# Patient Record
Sex: FEMALE | Race: WHITE
[De-identification: names, ages, dates, MRNs, and addresses within clinical notes are randomized per-mention and may not be internally consistent; named-entity substitution may affect disease eponyms.]

---
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
## 2023-03-30 NOTE — PROGRESS NOTE PEDS - PROBLEM SELECTOR PLAN 1
Medicare Wellness Visit  Plan for Preventive Care    A good way for you to stay healthy is to use preventive care.  Medicare covers many services that can help you stay healthy.* The goal of these services is to find any health problems as quickly as possible. Finding problems early can help make them easier to treat.  Your personal plan below lists the services you may need and when they are due.      Health Maintenance Summary     COVID-19 Vaccine (1)  Overdue - never done    Pneumococcal Vaccine 65+ (1 - PCV)  Overdue - never done    DTaP/Tdap/Td Vaccine (1 - Tdap)  Overdue - never done    Shingles Vaccine (1 of 2)  Overdue - never done    Osteoporosis Screening (Once)  Overdue - never done    Medicare Advantage- Medicare Wellness Visit (Yearly - January to December)  Due since 1/1/2023    Influenza Vaccine (1)  Postponed until 6/30/2023    Colorectal Cancer Screen- (Fecal Occult Blood - Yearly)  Ordered on 3/30/2023    Depression Screening (Yearly)  Next due on 3/30/2024    Hepatitis C Screening   Completed    Meningococcal Vaccine   Aged Out    Hepatitis B Vaccine (For Physician/APC Discussion)   Aged Out    HPV Vaccine   Aged Out           Preventive Care for Women and Men    Heart Screenings (Cardiovascular):  · Blood tests are used to check your cholesterol, lipid and triglyceride levels. High levels can increase your risk for heart disease and stroke. High levels can be treated with medications, diet and exercise. Lowering your levels can help keep your heart and blood vessels healthy.  Your provider will order these tests if they are needed.    · An ultrasound is done to see if you have an abdominal aortic aneurysm (AAA).  This is an enlargement of one of the main blood vessels that delivers blood to the body.   In the [place], 9,000 deaths are caused by AAA.  You may not even know you have this problem and as many as 1 in 3 people will have a serious problem if it is not treated.  Early diagnosis  allows for more effective treatment and cure.  If you have a family history of AAA or are a male age 65-75 who has smoked, you are at higher risk of an AAA.  Your provider can order this test, if needed.    Colorectal Screening:  · There are many tests that are used to check for cancer of your colon and rectum. You and your provider should discuss what test is best for you and when to have it done.  Options include:  · Screening Colonoscopy: exam of the entire colon, seen through a flexible lighted tube.  · Flexible Sigmoidoscopy: exam of the last third (sigmoid portion) of the colon and rectum, seen through a flexible lighted tube.  · Cologuard DNA stool test: a sample of your stool is used to screen for cancer and unseen blood in your stool.  · Fecal Occult Blood Test: a sample of your stool is studied to find any unseen blood    Flu Shot:  · An immunization that helps to prevent influenza (the flu). You should get this every year. The best time to get the shot is in the fall.    Pneumococcal Shot:  • Vaccines help prevent pneumococcal disease, which is any type of illness caused by Streptococcus pneumoniae bacteria. There are two kinds of pneumococcal vaccines available in the [place]:   o Pneumococcal conjugate vaccines (PCV20 or Iwotedp94®)  o Pneumococcal polysaccharide vaccine (PPSV23 or [id number]®)  · For those who have never received any pneumococcal conjugate vaccine, CDC recommends PVC20 for adults 65 years or older and adults 19 through 64 years old with certain medical conditions or risk factors.   · For those who have previously received PCV13, this should be followed by a dose of PPSV23.     Hepatitis B Shot:  · An immunization that helps to protect people from getting Hepatitis B. Hepatitis B is a virus that spreads through contact with infected blood or body fluids. Many people with the virus do not have symptoms.  The virus can lead to serious problems, such as liver disease. Some people  are at higher risk than others. Your doctor will tell you if you need this shot.     Diabetes Screening:  · A test to measure sugar (glucose) in your blood is called a fasting blood sugar. Fasting means you cannot have food or drink for at least 8 hours before the test. This test can detect diabetes long before you may notice symptoms.    Glaucoma Screening:  · Glaucoma screening is performed by your eye doctor. The test measures the fluid pressure inside your eyes to determine if you have glaucoma.     Hepatitis C Screening:  · A blood test to see if you have the hepatitis C virus.  Hepatitis C attacks the liver and is a major cause of chronic liver disease.  Medicare will cover a single screening for all adults born between 1945 & 1965, or high risk patients (people who have injected illegal drugs or people who have had blood transfusions).  High risk patients who continue to inject illegal drugs can be screened for Hepatitis C every year.    Smoking and Tobacco-Use Cessation Counseling:  · Tobacco is the single greatest cause of disease and early death in our country today. Medication and counseling together can increase a person’s chance of quitting for good.   · Medicare covers two quitting attempts per year, with four counseling sessions per attempt (eight sessions in a 12 month period)    Preventive Screening tests for Women    Screening Mammograms and Breast Exams:  · An x-ray of your breasts to check for breast cancer before you or your doctor may be able to feel it.  If breast cancer is found early it can usually be treated with success.    Pelvic Exams and Pap Tests:  · An exam to check for cervical and vaginal cancer. A Pap test is a lab test in which cells are taken from your cervix and sent to the lab to look for signs of cervical cancer. If cancer of the cervix is found early, chances for a cure are good. Testing can generally end at age 65, or if a woman has a hysterectomy for a benign condition.  Your provider may recommend more frequent testing if certain abnormal results are found.    Bone Mass Measurements:  · A painless x-ray of your bone density to see if you are at risk for a broken bone. Bone density refers to the thickness of bones or how tightly the bone tissue is packed.    Preventive Screening tests for Men    Prostate Screening:  · Should you have a prostate cancer test (PSA)?  It is up to you to decide if you want a prostate cancer test. Talk to your clinician to find out if the test is right for you.  Things for you to consider and talk about should include:  · Benefits and harms of the test  · Your family history  · How your race/ethnicity may influence the test  · If the test may impact other medical conditions you have  · Your values on screenings and treatments    *Medicare pays for many preventive services to keep you healthy. For some of these services, you might have to pay a deductible, coinsurance, and / or copayment.  The amounts vary depending on the type of services you need and the kind of Medicare health plan you have.    For further details on screenings offered by Medicare please visit: https://www.medicare.gov/coverage/preventive-screening-services    - Increase to 2200 calorie diet  - Daily BMP, Mg, Phos  - Daily weights and orthostatics  - Meals in day room with staff supervision  - 1 hour sit time after meals  - Patient is medically stable to attend school and afternoon [facility] groups and activities

## 2024-01-10 NOTE — PROGRESS NOTE PEDS - ASSESSMENT
Lactation Consultant Note  Lactation Consultation  Reason for Consult: Initial assessment  Consultant Name: Roberat GIULIANO De RN    Maternal Information  Has mother  before?: Yes  How long did the mother previously breastfeed?: 9months & 12months (other 2 children are 16monts apart)    Maternal Assessment       Infant Assessment  Infant Behavior:  (NB pink and sleeping in open crib)    Feeding Assessment       LATCH TOOL       Breast Pump       Other OB Lactation Tools       Patient Follow-up       Other OB Lactation Documentation  Infant Risk Factors:  (SGA  3085 at 41.2 weeks Gestation)  Additional Problem Noted: mother had 1 mild BP    Recommendations/Summary  RN in room at this time to offer assistance  Mother is   on 24 at 1736 of viable boy at 41.2 weeks gestation. Mom is gbs negative and A+ NB is SGA birthweight 6 3085 BG 58,64,61    Mother had a boy and girl at home. She  her first child for 9months, as she became pregnant and it was too difficult to maintain. She  her second child for 12months. Mother states breastfeeding is going well denies need for assistance. Mother waiting for discharge.    Educated parents on skin to skin, hand expression, hunger cues and feeding frequency/patterns. Discussed expected output, weight loss <10%, and normal bilirubin. Educated on AAP pacifier recommendations. Reviewed outpatient resources.    Michelle is a 15 y/o female with anxiety, autism, and long standing anorexia nervosa, admitted for malnutrition, bradycardia, and weight loss in the setting of caloric restriction.  She has increased risk for refeeding syndrome, therefore her electrolytes need to be monitored closely as her caloric intake is gradually increased.  Her labs have been stable.  She is bradycardic with a low overnight HR of 42. Discharged

## 2024-07-01 NOTE — CONSULT NOTE PEDS - SUBJECTIVE AND OBJECTIVE BOX
Pt is wanting 3mon f/u labs done. Please place lab orders, once placed will give Pt a call to schedule. Thank You!   Met w/ pt individually x 15min this morning. Discussed pt at length w/ nursing team and adoles. med.  Team reported worsening PO intake. Pt noted she did complete supplements at meals though cont. to have strong urges to restrict. Pt reported wanting to go home and to [facility] and not to [place]. Discussed would speak w/ team regarding plan but that pt should use that as motivation to complete meals. Pt denied feeling depressed or any si/hi/death wish. She cont .to report sig. anxieyt and trouble sleeping though noted she would not consider any medication even if father agreed. Pt denied current physical pain.    O: MSE- NAD, PMR . sleeping, awakens to voice, remains somewhat oddly related,  speech- remains dysarthric, expressive, mood- "good" affect- constricted ,fully reactive, smiles nervously at times, TP- goal directed, concrete, TC- denied si/hi/death wish/urges to self harm, Perception- does not appear to be responding to aHS/VHS, Cog- AAOx self, place, did not test date, I/J- limited, IC- good during interview

## 2024-07-13 NOTE — PROGRESS NOTE PEDS - PROVIDER SPECIALTY LIST PEDS
Adolescent Medicine Spoke with pt who reports that she is having back pain that goes to right leg. States she is calling to try to get refill for medication.Preferred pharmacy is [facility].     Dr. Duddleston called no contact made.  left.    Spoke with Dr. Do, and informed of pt request. Pt information given to provider,  and will look into matter, but is about to start with surgery so may be a few hours. But will call pt back    Pt informed, and verbalized understanding.      Reason for Disposition   [1] Prescription refill request for ESSENTIAL medicine (i.e., likelihood of harm to patient if not taken) AND [2] triager unable to refill per department policy    Protocols used: Medication Refill and Renewal Call-A-AH

## 2024-09-16 ENCOUNTER — APPOINTMENT (OUTPATIENT)
Dept: GASTROENTEROLOGY | Facility: CLINIC | Age: 19
End: 2024-09-16

## 2024-09-19 ENCOUNTER — EMERGENCY (EMERGENCY)
Facility: HOSPITAL | Age: 19
LOS: 1 days | Discharge: HOME | End: 2024-09-19
Attending: STUDENT IN AN ORGANIZED HEALTH CARE EDUCATION/TRAINING PROGRAM | Admitting: STUDENT IN AN ORGANIZED HEALTH CARE EDUCATION/TRAINING PROGRAM
Payer: MEDICAID

## 2024-09-19 VITALS
RESPIRATION RATE: 15 BRPM | DIASTOLIC BLOOD PRESSURE: 77 MMHG | SYSTOLIC BLOOD PRESSURE: 118 MMHG | OXYGEN SATURATION: 99 % | HEART RATE: 77 BPM | TEMPERATURE: 98 F

## 2024-09-19 VITALS
HEART RATE: 82 BPM | RESPIRATION RATE: 14 BRPM | DIASTOLIC BLOOD PRESSURE: 79 MMHG | SYSTOLIC BLOOD PRESSURE: 120 MMHG | HEIGHT: 63 IN | OXYGEN SATURATION: 100 % | TEMPERATURE: 99 F | WEIGHT: 119.93 LBS

## 2024-09-19 LAB
ALBUMIN SERPL ELPH-MCNC: 4.2 G/DL — SIGNIFICANT CHANGE UP (ref 3.3–5)
ALP SERPL-CCNC: 63 U/L — SIGNIFICANT CHANGE UP (ref 30–120)
ALT FLD-CCNC: 21 U/L — SIGNIFICANT CHANGE UP (ref 10–60)
ANION GAP SERPL CALC-SCNC: 10 MMOL/L — SIGNIFICANT CHANGE UP (ref 5–17)
APPEARANCE UR: CLEAR — SIGNIFICANT CHANGE UP
AST SERPL-CCNC: 15 U/L — SIGNIFICANT CHANGE UP (ref 10–40)
BASOPHILS # BLD AUTO: 0.05 K/UL — SIGNIFICANT CHANGE UP (ref 0–0.2)
BASOPHILS NFR BLD AUTO: 0.6 % — SIGNIFICANT CHANGE UP (ref 0–2)
BILIRUB SERPL-MCNC: 0.4 MG/DL — SIGNIFICANT CHANGE UP (ref 0.2–1.2)
BILIRUB UR-MCNC: NEGATIVE — SIGNIFICANT CHANGE UP
BUN SERPL-MCNC: 11 MG/DL — SIGNIFICANT CHANGE UP (ref 7–23)
CALCIUM SERPL-MCNC: 9.5 MG/DL — SIGNIFICANT CHANGE UP (ref 8.4–10.5)
CHLORIDE SERPL-SCNC: 104 MMOL/L — SIGNIFICANT CHANGE UP (ref 96–108)
CO2 SERPL-SCNC: 25 MMOL/L — SIGNIFICANT CHANGE UP (ref 22–31)
COLOR SPEC: YELLOW — SIGNIFICANT CHANGE UP
CREAT SERPL-MCNC: 0.67 MG/DL — SIGNIFICANT CHANGE UP (ref 0.5–1.3)
DIFF PNL FLD: NEGATIVE — SIGNIFICANT CHANGE UP
EGFR: 127 ML/MIN/1.73M2 — SIGNIFICANT CHANGE UP
EOSINOPHIL # BLD AUTO: 0.05 K/UL — SIGNIFICANT CHANGE UP (ref 0–0.5)
EOSINOPHIL NFR BLD AUTO: 0.6 % — SIGNIFICANT CHANGE UP (ref 0–6)
GLUCOSE SERPL-MCNC: 91 MG/DL — SIGNIFICANT CHANGE UP (ref 70–99)
GLUCOSE UR QL: NEGATIVE MG/DL — SIGNIFICANT CHANGE UP
HCG SERPL-ACNC: <1 MIU/ML — SIGNIFICANT CHANGE UP
HCG UR QL: NEGATIVE — SIGNIFICANT CHANGE UP
HCT VFR BLD CALC: 34.6 % — SIGNIFICANT CHANGE UP (ref 34.5–45)
HGB BLD-MCNC: 11.5 G/DL — SIGNIFICANT CHANGE UP (ref 11.5–15.5)
IMM GRANULOCYTES NFR BLD AUTO: 0.2 % — SIGNIFICANT CHANGE UP (ref 0–0.9)
KETONES UR-MCNC: NEGATIVE MG/DL — SIGNIFICANT CHANGE UP
LEUKOCYTE ESTERASE UR-ACNC: NEGATIVE — SIGNIFICANT CHANGE UP
LIDOCAIN IGE QN: 32 U/L — SIGNIFICANT CHANGE UP (ref 16–77)
LYMPHOCYTES # BLD AUTO: 2.07 K/UL — SIGNIFICANT CHANGE UP (ref 1–3.3)
LYMPHOCYTES # BLD AUTO: 24 % — SIGNIFICANT CHANGE UP (ref 13–44)
MCHC RBC-ENTMCNC: 28.5 PG — SIGNIFICANT CHANGE UP (ref 27–34)
MCHC RBC-ENTMCNC: 33.2 GM/DL — SIGNIFICANT CHANGE UP (ref 32–36)
MCV RBC AUTO: 85.6 FL — SIGNIFICANT CHANGE UP (ref 80–100)
MONOCYTES # BLD AUTO: 0.48 K/UL — SIGNIFICANT CHANGE UP (ref 0–0.9)
MONOCYTES NFR BLD AUTO: 5.6 % — SIGNIFICANT CHANGE UP (ref 2–14)
NEUTROPHILS # BLD AUTO: 5.94 K/UL — SIGNIFICANT CHANGE UP (ref 1.8–7.4)
NEUTROPHILS NFR BLD AUTO: 69 % — SIGNIFICANT CHANGE UP (ref 43–77)
NITRITE UR-MCNC: NEGATIVE — SIGNIFICANT CHANGE UP
NRBC # BLD: 0 /100 WBCS — SIGNIFICANT CHANGE UP (ref 0–0)
PH UR: 6.5 — SIGNIFICANT CHANGE UP (ref 5–8)
PLATELET # BLD AUTO: 292 K/UL — SIGNIFICANT CHANGE UP (ref 150–400)
POTASSIUM SERPL-MCNC: 3.4 MMOL/L — LOW (ref 3.5–5.3)
POTASSIUM SERPL-SCNC: 3.4 MMOL/L — LOW (ref 3.5–5.3)
PROT SERPL-MCNC: 7.2 G/DL — SIGNIFICANT CHANGE UP (ref 6–8.3)
PROT UR-MCNC: NEGATIVE MG/DL — SIGNIFICANT CHANGE UP
RBC # BLD: 4.04 M/UL — SIGNIFICANT CHANGE UP (ref 3.8–5.2)
RBC # FLD: 12.6 % — SIGNIFICANT CHANGE UP (ref 10.3–14.5)
SODIUM SERPL-SCNC: 139 MMOL/L — SIGNIFICANT CHANGE UP (ref 135–145)
SP GR SPEC: 1.01 — SIGNIFICANT CHANGE UP (ref 1–1.03)
UROBILINOGEN FLD QL: 0.2 MG/DL — SIGNIFICANT CHANGE UP (ref 0.2–1)
WBC # BLD: 8.61 K/UL — SIGNIFICANT CHANGE UP (ref 3.8–10.5)
WBC # FLD AUTO: 8.61 K/UL — SIGNIFICANT CHANGE UP (ref 3.8–10.5)

## 2024-09-19 PROCEDURE — 85025 COMPLETE CBC W/AUTO DIFF WBC: CPT

## 2024-09-19 PROCEDURE — 99284 EMERGENCY DEPT VISIT MOD MDM: CPT | Mod: 25

## 2024-09-19 PROCEDURE — 81003 URINALYSIS AUTO W/O SCOPE: CPT

## 2024-09-19 PROCEDURE — 83690 ASSAY OF LIPASE: CPT

## 2024-09-19 PROCEDURE — 36415 COLL VENOUS BLD VENIPUNCTURE: CPT

## 2024-09-19 PROCEDURE — 81025 URINE PREGNANCY TEST: CPT

## 2024-09-19 PROCEDURE — 74177 CT ABD & PELVIS W/CONTRAST: CPT | Mod: MC

## 2024-09-19 PROCEDURE — 84702 CHORIONIC GONADOTROPIN TEST: CPT

## 2024-09-19 PROCEDURE — 80053 COMPREHEN METABOLIC PANEL: CPT

## 2024-09-19 PROCEDURE — 99285 EMERGENCY DEPT VISIT HI MDM: CPT

## 2024-09-19 PROCEDURE — 96360 HYDRATION IV INFUSION INIT: CPT | Mod: XU

## 2024-09-19 PROCEDURE — 74177 CT ABD & PELVIS W/CONTRAST: CPT | Mod: 26,MC

## 2024-09-19 RX ORDER — SODIUM CHLORIDE 9 MG/ML
1000 INJECTION INTRAMUSCULAR; INTRAVENOUS; SUBCUTANEOUS ONCE
Refills: 0 | Status: COMPLETED | OUTPATIENT
Start: 2024-09-19 | End: 2024-09-19

## 2024-09-19 RX ADMIN — SODIUM CHLORIDE 1000 MILLILITER(S): 9 INJECTION INTRAMUSCULAR; INTRAVENOUS; SUBCUTANEOUS at 15:15

## 2024-09-19 RX ADMIN — SODIUM CHLORIDE 1000 MILLILITER(S): 9 INJECTION INTRAMUSCULAR; INTRAVENOUS; SUBCUTANEOUS at 16:06

## 2024-09-19 NOTE — ED PROVIDER NOTE - CARE PROVIDER_API CALL
Lee Ann Ceballos  Obstetrics and Gynecology  [address] [place] [id number]  Phone: [phone]  Fax: [phone]  Follow Up Time: 4-6 Days

## 2024-09-19 NOTE — ED ADULT NURSE NOTE - NSFALLUNIVINTERV_ED_ALL_ED
Bed/Stretcher in lowest position, wheels locked, appropriate side rails in place/Call bell, personal items and telephone in reach/Instruct patient to call for assistance before getting out of bed/chair/stretcher/Non-slip footwear applied when patient is off stretcher/[place] to call system/Physically safe environment - no spills, clutter or unnecessary equipment/Purposeful proactive rounding/Room/bathroom lighting operational, light cord in reach

## 2024-09-19 NOTE — ED PROVIDER NOTE - PROGRESS NOTE DETAILS
Results discussed with patient and boyfriend at bedside..  Patient does not have an OB/GYN so we will give GYN follow-up.

## 2024-09-19 NOTE — ED PROVIDER NOTE - OBJECTIVE STATEMENT
19 F here complaining of right sided abdominal pain. on going / worsening for the past few day. patient has associated nausea and dysuria. patient reports she went to outside hospital ER approximately 1 week ago for the same pain, was told she had an urinary tract infection and was put on antibiotics. patient initaially on doxy but had side effects so was changed to keflex........... 19 F here complaining of right sided abdominal pain. on going / worsening for the past few day. patient has associated nausea and dysuria. patient reports she went to outside hospital ER approximately 1 week ago for the same pain, was told she had an urinary tract infection and was put on antibiotics. patient initially on doxy but had side effects so was changed to keflex ........

## 2024-09-19 NOTE — ED PROVIDER NOTE - PATIENT PORTAL LINK FT
You can access the FollowMyHealth Patient Portal offered by [facility] by registering at the following website: [email or web address]. By joining [employer]’s FollowMyHealth portal, you will also be able to view your health information using other applications (apps) compatible with our system.

## 2024-09-19 NOTE — ED ADULT NURSE NOTE - CHPI ED NUR SYMPTOMS NEG
no blood in stool/no burning urination/no chills/no diarrhea/no fever/no hematuria/no vomiting no blood in stool/no chills/no diarrhea/no fever/no hematuria/no vomiting

## 2024-09-19 NOTE — ED PROVIDER NOTE - NSFOLLOWUPINSTRUCTIONS_ED_ALL_ED_FT
Please follow up with your Primary Care Physician and any specialists as discussed.  Increase fiber and water intake as well as exercise to help with constipation.    You can take acetaminophen (Tylenol) for your pain. It is available over the counter. You can take up to 1 gram (three 325mg tablets or two 500mg tablets) every 4-6 hours as needed.    You can also take an NSAID (non-steroidal anti-inflammatory drug) such as ibuprofen (Motrin, Advil), or naproxen (Aleve) for your pain. These are available over the counter. You can take 3 tablets of ibuprofen (200mg each) every 6 hours or 2 tablets of naproxen (220mg each) every 12 hours. Do no mix NSAIDs such as ibuprofen, diclofenac, ketorolac, and naproxen. Please take as needed and please take with food.    You can alternate acetaminophen and a NSAID to help further with pain.   If your symptoms persist or worsen, please seek care. Either return to the Emergency Department, go to urgent care or see your primary care doctor.  Please refer to general information and instructions attached or below:     Acute Abdominal Pain    WHAT YOU NEED TO KNOW:    The cause of your abdominal pain may not be found. If a cause is found, treatment will depend on what the cause is.     DISCHARGE INSTRUCTIONS:    Return to the emergency department if:     You vomit blood or cannot stop vomiting.      You have blood in your bowel movement or it looks like tar.       You have bleeding from your rectum.       Your abdomen is larger than usual, more painful, and hard.       You have severe pain in your abdomen.       You stop passing gas and having bowel movements.       You feel weak, dizzy, or faint.    Contact your healthcare provider if:     You have a fever.      You have new signs and symptoms.      Your symptoms do not get better with treatment.       You have questions or concerns about your condition or care.    Medicines may be given to decrease pain, treat an infection, and manage your symptoms. Take your medicine as directed. Call your healthcare provider if you think your medicine is not helping or if you have side effects. Tell him if you are allergic to any medicine. Keep a list of the medicines, vitamins, and herbs you take. Include the amounts, and when and why you take them. Bring the list or the pill bottles to follow-up visits. Carry your medicine list with you in case of an emergency.    Manage your symptoms:     Apply heat on your abdomen for 20 to 30 minutes every 2 hours for as many days as directed. Heat helps decrease pain and muscle spasms.       Manage your stress. Stress may cause abdominal pain. Your healthcare provider may recommend relaxation techniques and deep breathing exercises to help decrease your stress. Your healthcare provider may recommend you talk to someone about your stress or anxiety, such as a counselor or a trusted friend. Get plenty of sleep and exercise regularly.       Limit or do not drink alcohol. Alcohol can make your abdominal pain worse. Ask your healthcare provider if it is safe for you to drink alcohol. Also ask how much is safe for you to drink.       Do not smoke. Nicotine and other chemicals in cigarettes can damage your esophagus and stomach. Ask your healthcare provider for information if you currently smoke and need help to quit. E-cigarettes or smokeless tobacco still contain nicotine. Talk to your healthcare provider before you use these products.     Make changes to the food you eat as directed: Do not eat foods that cause abdominal pain or other symptoms. Eat small meals more often.     Eat more high-fiber foods if you are constipated. High-fiber foods include fruits, vegetables, whole-grain foods, and legumes.       Do not eat foods that cause gas if you have bloating. Examples include broccoli, cabbage, and cauliflower. Do not drink soda or carbonated drinks, because these may also cause gas.       Do not eat foods or drinks that contain sorbitol or fructose if you have diarrhea and bloating. Some examples are fruit juices, candy, jelly, and sugar-free gum.       Do not eat high-fat foods, such as fried foods, cheeseburgers, hot dogs, and desserts.      Limit or do not drink caffeine. Caffeine may make symptoms, such as heart burn or nausea, worse.       Drink plenty of liquids to prevent dehydration from diarrhea or vomiting. Ask your healthcare provider how much liquid to drink each day and which liquids are best for you.

## 2024-09-19 NOTE — ED ADULT TRIAGE NOTE - CHIEF COMPLAINT QUOTE
persistent right sided abd pain with nausea for weeks,lmp 9/1, recent UTI with antibiotics, seen at [facility] 2 days ago

## 2024-09-19 NOTE — ED ADULT NURSE NOTE - CHIEF COMPLAINT QUOTE
persistent right sided abd pain with nausea for weeks,lmp 9/1, recent UTI with antibiotics, seen at [facility] 2 days ago

## 2024-09-19 NOTE — ED ADULT NURSE NOTE - OBJECTIVE STATEMENT
pt comes to ed c/o abd pain with nausea for weeks, lmp 9/1, recent UTI with antibiotics, seen at [facility] 2 days ago pt comes to ed c/o R side abd pain with nausea for weeks, lmp 9/1, completed abx for recent UTI, still having some residual symptoms. pt states she was seen at [facility] 2 days ago and never did a CT scan. pt wants to make sure she doesn't have appendicitis.

## 2024-09-23 ENCOUNTER — EMERGENCY (EMERGENCY)
Facility: HOSPITAL | Age: 19
LOS: 1 days | Discharge: HOME | End: 2024-09-23
Attending: EMERGENCY MEDICINE | Admitting: EMERGENCY MEDICINE
Payer: MEDICAID

## 2024-09-23 VITALS
RESPIRATION RATE: 16 BRPM | OXYGEN SATURATION: 99 % | TEMPERATURE: 98 F | HEART RATE: 83 BPM | WEIGHT: 126.99 LBS | SYSTOLIC BLOOD PRESSURE: 110 MMHG | DIASTOLIC BLOOD PRESSURE: 70 MMHG | HEIGHT: 63 IN

## 2024-09-23 VITALS
RESPIRATION RATE: 18 BRPM | DIASTOLIC BLOOD PRESSURE: 72 MMHG | SYSTOLIC BLOOD PRESSURE: 109 MMHG | HEART RATE: 87 BPM | OXYGEN SATURATION: 98 % | TEMPERATURE: 98 F

## 2024-09-23 DIAGNOSIS — Z78.9 OTHER SPECIFIED HEALTH STATUS: Chronic | ICD-10-CM

## 2024-09-23 LAB
APPEARANCE UR: CLEAR — SIGNIFICANT CHANGE UP
BILIRUB UR-MCNC: NEGATIVE — SIGNIFICANT CHANGE UP
COLOR SPEC: SIGNIFICANT CHANGE UP
DIFF PNL FLD: NEGATIVE — SIGNIFICANT CHANGE UP
GLUCOSE UR QL: NEGATIVE MG/DL — SIGNIFICANT CHANGE UP
HCG UR QL: NEGATIVE — SIGNIFICANT CHANGE UP
KETONES UR-MCNC: 15 MG/DL
LEUKOCYTE ESTERASE UR-ACNC: NEGATIVE — SIGNIFICANT CHANGE UP
NITRITE UR-MCNC: NEGATIVE — SIGNIFICANT CHANGE UP
PH UR: 5.5 — SIGNIFICANT CHANGE UP (ref 5–8)
PROT UR-MCNC: NEGATIVE MG/DL — SIGNIFICANT CHANGE UP
SP GR SPEC: 1.03 — SIGNIFICANT CHANGE UP (ref 1–1.03)
UROBILINOGEN FLD QL: 0.2 MG/DL — SIGNIFICANT CHANGE UP (ref 0.2–1)

## 2024-09-23 PROCEDURE — 99284 EMERGENCY DEPT VISIT MOD MDM: CPT

## 2024-09-23 PROCEDURE — 81025 URINE PREGNANCY TEST: CPT

## 2024-09-23 PROCEDURE — 70450 CT HEAD/BRAIN W/O DYE: CPT | Mod: MC

## 2024-09-23 PROCEDURE — 99284 EMERGENCY DEPT VISIT MOD MDM: CPT | Mod: 25

## 2024-09-23 PROCEDURE — 81003 URINALYSIS AUTO W/O SCOPE: CPT

## 2024-09-23 PROCEDURE — 70450 CT HEAD/BRAIN W/O DYE: CPT | Mod: 26,MC

## 2024-09-23 RX ORDER — SODIUM CHLORIDE 9 MG/ML
1000 INJECTION INTRAMUSCULAR; INTRAVENOUS; SUBCUTANEOUS ONCE
Refills: 0 | Status: DISCONTINUED | OUTPATIENT
Start: 2024-09-23 | End: 2024-09-23

## 2024-09-23 RX ORDER — ACETAMINOPHEN 325 MG/1
1000 TABLET ORAL ONCE
Refills: 0 | Status: DISCONTINUED | OUTPATIENT
Start: 2024-09-23 | End: 2024-09-23

## 2024-09-23 RX ORDER — ONDANSETRON 2 MG/ML
4 INJECTION, SOLUTION INTRAMUSCULAR; INTRAVENOUS ONCE
Refills: 0 | Status: COMPLETED | OUTPATIENT
Start: 2024-09-23 | End: 2024-09-23

## 2024-09-23 RX ORDER — ACETAMINOPHEN 325 MG/1
650 TABLET ORAL ONCE
Refills: 0 | Status: COMPLETED | OUTPATIENT
Start: 2024-09-23 | End: 2024-09-23

## 2024-09-23 RX ADMIN — ACETAMINOPHEN 650 MILLIGRAM(S): 325 TABLET ORAL at 19:22

## 2024-09-23 RX ADMIN — ACETAMINOPHEN 650 MILLIGRAM(S): 325 TABLET ORAL at 19:45

## 2024-09-23 RX ADMIN — ONDANSETRON 4 MILLIGRAM(S): 2 INJECTION, SOLUTION INTRAMUSCULAR; INTRAVENOUS at 19:22

## 2024-09-23 NOTE — ED PROVIDER NOTE - PROVIDER TOKENS
FREE:[LAST:[YOUR NEUROLOGIST],PHONE:[(   )    -],FAX:[(   )    -],FOLLOWUP:[1-3 Days]],PROVIDER:[TOKEN:[[id number]],FOLLOWUP:[1-3 Days]],PROVIDER:[TOKEN:[[id number]],FOLLOWUP:[1-3 Days]]

## 2024-09-23 NOTE — ED ADULT NURSE NOTE - OBJECTIVE STATEMENT
multiple complaints, persistent pain on urination, skin disorders,I was here last week and I was told I had ovarian cyst and it hurts to pee. im also having bad headaches that worsen yesterday. I also notice white bumps inside my nose after a piercing. The urgent care said it might be herpes.

## 2024-09-23 NOTE — ED PROVIDER NOTE - OBJECTIVE STATEMENT
19-year-old female with history of migraines, ovarian cysts presents with complaint of headache x 3 days.  Patient states that she recently hit her left frontal head on a chair at home on 9/21 and since has had a headache with occasional nausea and lightheadedness.  Denies LOC.  States unsure if headache is also related to her migraines.  States that she takes sumatriptan for migraines as needed however has not taken any pain medication since the headache started.  Patient states that she was seen in the ER 2 weeks ago for dysuria and was diagnosed with UTI and started on antibiotics, was seen in the ER again last week for right-sided abdominal pain, had CT scan of abdomen pelvis and was told to have bilateral ovarian cysts and has not made an appointment with GYN.  Denies any acute symptoms however states that she has occasional pain in her suprapubic area when she urinates since her last visit to the ER.  Denies dysuria/frequency/hematuria, vaginal discharge, vaginal bleeding, fever, chills, vomiting, numbness, tingling, focal weakness, use of blood thinners or other symptoms.  Patient states that she has a neurologist and had an MRI few months ago which was normal. 19-year-old female with history of migraines, ovarian cysts presents with complaint of headache x 3 days.  Patient states that she recently hit her left frontal head on a chair at home on 9/21 and since has had a headache with occasional nausea and lightheadedness.  Denies LOC.  States unsure if headache is also related to her migraines.  States that she takes sumatriptan for migraines as needed however has not taken any pain medication since the headache started.  Patient states that she was seen in the ER 2 weeks ago for dysuria and was diagnosed with UTI and started on antibiotics, was seen in the ER again last week for right-sided abdominal pain, had CT scan of abdomen pelvis and was told to have bilateral ovarian cysts and has not yet made an appointment with GYN.  Denies any acute symptoms however states that she has occasional pain in her suprapubic area when she urinates since her last visit to the ER.  Denies dysuria/frequency/hematuria, vaginal discharge, vaginal bleeding, fever, chills, vomiting, numbness, tingling, focal weakness, use of blood thinners, rash, neck stiffness, photophobia, vision changes or other symptoms.  Patient states that she has a neurologist and had an MRI few months ago which was normal.

## 2024-09-23 NOTE — ED PROVIDER NOTE - CARE PROVIDERS DIRECT ADDRESSES
,DirectAddress_Unknown,DirectAddress_Unknown,[email or web address]

## 2024-09-23 NOTE — ED PROVIDER NOTE - CARE PLAN
1 Principal Discharge DX:	Headache   Principal Discharge DX:	Headache  Secondary Diagnosis:	Closed head injury

## 2024-09-23 NOTE — ED PROVIDER NOTE - NSFOLLOWUPINSTRUCTIONS_ED_ALL_ED_FT
Follow-up with your neurologist and GYN for reevaluation, ongoing care and treatment.  Rest, drink plenty of fluids.  Take sumatriptan as prescribed earlier or over-the-counter Excedrin Migraine as directed.  If having worsening symptoms, vomiting or other related symptoms, return to the ER immediately.      Migraine Headache  A migraine headache is a very strong throbbing pain on one or both sides of your head. This type of headache can also cause other symptoms. It can last from 4 hours to 3 days. Talk with your doctor about what things may bring on (trigger) this condition.    What are the causes?  The exact cause of a migraine is not known. This condition may be brought on or caused by:  Smoking.  Medicines, such as:  Medicine used to treat chest pain (nitroglycerin).  Birth control pills.  Estrogen.  Some blood pressure medicines.  Certain substances in some foods or drinks.  Foods and drinks, such as:  Cheese.  Chocolate.  Alcohol.  Caffeine.  Doing physical activity that is very hard.  Other things that may trigger a migraine headache include:  Periods.  Pregnancy.  Hunger.  Stress.  Getting too much or too little sleep.  Weather changes.  Feeling tired (fatigue).  What increases the risk?  Being 25–55 years old.  Being female.  Having a family history of migraine headaches.  Being .  Having a mental health condition, such as being sad (depressed) or feeling worried or nervous (anxious).  Being very overweight (obese).  What are the signs or symptoms?  A throbbing pain. This pain may:  Happen in any area of the head, such as on one or both sides.  Make it hard to do daily activities.  Get worse with physical activity.  Get worse around bright lights, loud noises, or smells.  Other symptoms may include:  Feeling like you may vomit (nauseous).  Vomiting.  Dizziness.  Before a migraine headache starts, you may get warning signs (an aura). An aura may include:  Seeing flashing lights or having blind spots.  Seeing bright spots, halos, or zigzag lines.  Having tunnel vision or blurred vision.  Having numbness or a tingling feeling.  Having trouble talking.  Having weak muscles.  After a migraine ends, you may have symptoms. These may include:  Tiredness.  Trouble thinking (concentrating).  How is this treated?  Taking medicines that:  Relieve pain.  Relieve the feeling like you may vomit.  Prevent migraine headaches.  Treatment may also include:  Acupuncture.  Lifestyle changes like avoiding foods that bring on migraine headaches.  Learning ways to control your body functions (biofeedback).  Therapy to help you know and deal with negative thoughts (cognitive behavioral therapy).  Follow these instructions at home:  Medicines    Take over-the-counter and prescription medicines only as told by your doctor.  If told, take steps to prevent problems with pooping (constipation). You may need to:  Drink enough fluid to keep your pee (urine) pale yellow.  Take medicines. You will be told what medicines to take.  Eat foods that are high in fiber. These include beans, whole grains, and fresh fruits and vegetables.  Limit foods that are high in fat and sugar. These include fried or sweet foods.  Ask your doctor if you should avoid driving or using machines while you are taking your medicine.    Lifestyle    A person sitting on the floor doing yoga.  Do not drink alcohol.  Do not smoke or use any products that contain nicotine or tobacco. If you need help quitting, ask your doctor.  Get 7–9 hours of sleep each night, or the amount recommended by your doctor.  Find ways to deal with stress, such as meditation, deep breathing, or yoga.  Try to exercise often. This can help lessen how bad and how often your migraines happen.  General instructions    Keep a journal to find out what may bring on your migraine headaches. This can help you avoid those things. For example, write down:  What you eat and drink.  How much sleep you get.  Any change to your medicines or diet.  If you have a migraine headache:  Avoid things that make your symptoms worse, such as bright lights.  Lie down in a dark, quiet room.  Do not drive or use machinery.  Ask your doctor what activities are safe for you.  Where to find more information  Coalition for Headache and Migraine Patients (CHAMP): headachemigraine.org  American Migraine Foundation: americanmigrainefoundation.org  National Headache Foundation: headaches.org  Contact a doctor if:  You get a migraine headache that is different or worse than others you have had.  You have more than 15 days of headaches in one month.  Get help right away if:  Your migraine headache gets very bad.  Your migraine headache lasts more than 72 hours.  You have a fever or stiff neck.  You have trouble seeing.  Your muscles feel weak or like you cannot control them.  You lose your balance a lot.  You have trouble walking.  You faint.  You have a seizure.

## 2024-09-23 NOTE — ED PROVIDER NOTE - PATIENT PORTAL LINK FT
You can access the FollowMyHealth Patient Portal offered by [facility] by registering at the following website: [email or web address]. By joining [employer]’s FollowMyHealth portal, you will also be able to view your health information using other applications (apps) compatible with our system.

## 2024-09-23 NOTE — ED PROVIDER NOTE - CLINICAL SUMMARY MEDICAL DECISION MAKING FREE TEXT BOX
19-year-old female with history of migraines, ovarian cysts presents with complaint of headache x 3 days.  Patient states that she recently hit her left frontal head on a chair at home on 9/21 and since has had a headache with occasional nausea and lightheadedness.  Denies LOC.  States unsure if headache is also related to her migraines.  States that she takes sumatriptan for migraines as needed however has not taken any pain medication since the headache started.  Patient states that she was seen in the ER 2 weeks ago for dysuria and was diagnosed with UTI and started on antibiotics, was seen in the ER again last week for right-sided abdominal pain, had CT scan of abdomen pelvis and was told to have bilateral ovarian cysts and has not made an appointment with GYN.  Denies any acute symptoms however states that she has occasional pain in her suprapubic area when she urinates since her last visit to the ER.  Denies dysuria/frequency/hematuria, vaginal discharge, vaginal bleeding, fever, chills, vomiting, numbness, tingling, focal weakness, use of blood thinners or other symptoms.  Patient states that she has a neurologist and had an MRI few months ago which was normal.    VS noted  Pt is in no acute distress  Physical exam unremarkable. Neuro exam nonfocal  Will give pain management  Will obtain CTH to eval for intracranial pathology  Will reassess.

## 2024-09-23 NOTE — ED PROVIDER NOTE - CARE PROVIDER_API CALL
YOUR NEUROLOGIST,   Phone: (   )    -  Fax: (   )    -  Follow Up Time: 1-3 Days    Darshan Silver  Neurology  [address] [place] [id number]  Phone: [phone]  Fax: [phone]  Follow Up Time: 1-3 Days    Rickey Hair  Obstetrics and Gynecology  [address] [place] [id number]  Phone: [phone]  Fax: [phone]  Follow Up Time: 1-3 Days

## 2024-09-23 NOTE — ED ADULT TRIAGE NOTE - CHIEF COMPLAINT QUOTE
I was here last week and I was told I had ovarian cyst and it hurts to pee. im also having bad headaches that worsen yesterday. I also notice white bumps inside my nose after a piercing. The urgent care said it might be herpes.

## 2024-09-23 NOTE — ED PROVIDER NOTE - SKIN, MLM
Troponin 0.072 troponin level 1.570 ptt 165.7 Skin normal color for race, warm, dry and intact. No evidence of rash.

## 2024-09-27 ENCOUNTER — EMERGENCY (EMERGENCY)
Facility: HOSPITAL | Age: 19
LOS: 1 days | Discharge: HOME | End: 2024-09-27
Attending: STUDENT IN AN ORGANIZED HEALTH CARE EDUCATION/TRAINING PROGRAM | Admitting: STUDENT IN AN ORGANIZED HEALTH CARE EDUCATION/TRAINING PROGRAM
Payer: MEDICAID

## 2024-09-27 VITALS
DIASTOLIC BLOOD PRESSURE: 78 MMHG | TEMPERATURE: 99 F | HEART RATE: 78 BPM | WEIGHT: 133.38 LBS | SYSTOLIC BLOOD PRESSURE: 117 MMHG | RESPIRATION RATE: 13 BRPM | HEIGHT: 63 IN | OXYGEN SATURATION: 100 %

## 2024-09-27 PROCEDURE — 99283 EMERGENCY DEPT VISIT LOW MDM: CPT | Mod: 25

## 2024-09-27 PROCEDURE — 99283 EMERGENCY DEPT VISIT LOW MDM: CPT

## 2024-09-27 NOTE — ED ADULT TRIAGE NOTE - HEIGHT IN INCHES
Pt is pleasant and cooperative  Pt is med compliant  Pt was isolative to room except when making needs known  Pt denies depression and anxiety at this time  Pt denies SI/HI and AH/VH  Pt requested Ambien for difficulty sleeping  No unmet needs reported  Will continue to monitor 
3
10 (severe pain)

## 2024-09-27 NOTE — ED ADULT NURSE NOTE - CHPI ED NUR SYMPTOMS POS
I have a migraine which I usually get and it feels "leaky" in my head which it usually does but it seems like more today. Pt took Tylenol this am/NAUSEA

## 2024-09-27 NOTE — ED ADULT NURSE NOTE - BREATHING, MLM
GI source of blood loss, now s/p 4U pRBCs in total this admission. Hb stable in 10 range. Transfusion threshold Hb <8 given significant cardiac comorbidities or with active re-bleeding. Spontaneous, unlabored and symmetrical

## 2024-09-27 NOTE — ED ADULT NURSE NOTE - OBJECTIVE STATEMENT
I have a migraine which I usually get and it feels "leaky" in my head which it usually does but it seems like more today. Pt took Tylenol this am  migraine

## 2024-09-27 NOTE — ED ADULT TRIAGE NOTE - CHIEF COMPLAINT QUOTE
I have a migraine which I usually get and it feels "leaky" in my head which it usually does but it seems like more today. Pt took Tylenol this am

## 2024-09-28 VITALS
DIASTOLIC BLOOD PRESSURE: 76 MMHG | RESPIRATION RATE: 15 BRPM | SYSTOLIC BLOOD PRESSURE: 115 MMHG | HEART RATE: 89 BPM | TEMPERATURE: 99 F | OXYGEN SATURATION: 100 %

## 2024-09-28 NOTE — ED PROVIDER NOTE - CARE PROVIDER_API CALL
Darshan Silver  Neurology  [address] [place] [address]  Phone: [phone]  Fax: [phone]  Follow Up Time:     Chasity Art  Follow Up Time:

## 2024-09-28 NOTE — ED PROVIDER NOTE - OBJECTIVE STATEMENT
19-year-old female with a history of migraines, ovarian cysts presents with a headache for the past several days.  Patient was seen in the emergency room 4 days ago, on 9/23 for the same.  Patient states that she has chronic migraines, saw neurologist several weeks ago.  She is taking Nurtec and sumatriptan which has provided some relief.  Patient also is taking Tylenol with relief.  Patient states that currently her headache is mild however she felt as though something was  "leaking" in her brain and she was concerned that she had a CSF leak.  Patient had a CT of her head on 9/23 that was unremarkable.

## 2024-09-28 NOTE — ED PROVIDER NOTE - PROVIDER TOKENS
PROVIDER:[TOKEN:[[id number]]],PROVIDER:[TOKEN:[[id number]]]

## 2024-09-28 NOTE — ED PROVIDER NOTE - CLINICAL SUMMARY MEDICAL DECISION MAKING FREE TEXT BOX
19 year old female with a history of migraines p/w headache x several weeks and concern that she is "leaking" something into her brain.  Was seen in ED on 9/23 for headache, had CT head that was unremarkable.  No foal neuro deficits on exam.  No indication for repeat imaging or CT at this time

## 2024-09-28 NOTE — ED PROVIDER NOTE - NSFOLLOWUPINSTRUCTIONS_ED_ALL_ED_FT
Please take your migraine medication as prescribed and follow up with neurology.  Return to the ER for persistent headache, vomiting, fever, rash, lethargy, blurry vision, or any other concerns.     Migraine Headache    A migraine headache is an intense, throbbing pain on one side or both sides of the head. Migraine headaches may also cause other symptoms, such as nausea, vomiting, and sensitivity to light and noise. A migraine headache can last from 4 hours to 3 days. Talk with your doctor about what things may bring on (trigger) your migraine headaches.    What are the causes?  The exact cause of this condition is not known. However, a migraine may be caused when nerves in the brain become irritated and release chemicals that cause inflammation of blood vessels. This inflammation causes pain. This condition may be triggered or caused by:    Drinking alcohol.  Smoking.  Taking medicines, such as:    Medicine used to treat chest pain (nitroglycerin).  Birth control pills.  Estrogen.  Certain blood pressure medicines.  Eating or drinking products that contain nitrates, glutamate, aspartame, or tyramine. Aged cheeses, chocolate, or caffeine may also be triggers.  Doing physical activity.    Other things that may trigger a migraine headache include:    Menstruation.  Pregnancy.  Hunger.  Stress.  Lack of sleep or too much sleep.  Weather changes.  Fatigue.    What increases the risk?  The following factors may make you more likely to experience migraine headaches:    Being a certain age. This condition is more common in people who are 25–55 years old.  Being female.  Having a family history of migraine headaches.  Being .  Having a mental health condition, such as depression or anxiety.  Being obese.    What are the signs or symptoms?  The main symptom of this condition is pulsating or throbbing pain. This pain may:    Happen in any area of the head, such as on one side or both sides.  Interfere with daily activities.  Get worse with physical activity.  Get worse with exposure to bright lights or loud noises.    Other symptoms may include:    Nausea.  Vomiting.  Dizziness.  General sensitivity to bright lights, loud noises, or smells.    Before you get a migraine headache, you may get warning signs (an aura). An aura may include:    Seeing flashing lights or having blind spots.  Seeing bright spots, halos, or zigzag lines.  Having tunnel vision or blurred vision.  Having numbness or a tingling feeling.  Having trouble talking.  Having muscle weakness.    Some people have symptoms after a migraine headache (postdromal phase), such as:    Feeling tired.  Difficulty concentrating.    How is this diagnosed?  A migraine headache can be diagnosed based on:    Your symptoms.  A physical exam.  Tests, such as:     CT scan or an MRI of the head. These imaging tests can help rule out other causes of headaches.  Taking fluid from the spine (lumbar puncture) and analyzing it (cerebrospinal fluid analysis, or CSF analysis).    How is this treated?  This condition may be treated with medicines that:    Relieve pain.  Relieve nausea.  Prevent migraine headaches.    Treatment for this condition may also include:    Acupuncture.  Lifestyle changes like avoiding foods that trigger migraine headaches.  Biofeedback.  Cognitive behavioral therapy.    Follow these instructions at home:      Medicines    Take over-the-counter and prescription medicines only as told by your health care provider.  Ask your health care provider if the medicine prescribed to you:    Requires you to avoid driving or using heavy machinery.  Can cause constipation. You may need to take these actions to prevent or treat constipation:    Drink enough fluid to keep your urine pale yellow.  Take over-the-counter or prescription medicines.  Eat foods that are high in fiber, such as beans, whole grains, and fresh fruits and vegetables.  Limit foods that are high in fat and processed sugars, such as fried or sweet foods.        Lifestyle    Do not drink alcohol.  Do not use any products that contain nicotine or tobacco, such as cigarettes, e-cigarettes, and chewing tobacco. If you need help quitting, ask your health care provider.  Get at least 8 hours of sleep every night.  Find ways to manage stress, such as meditation, deep breathing, or yoga.        General instructions      Keep a journal to find out what may trigger your migraine headaches. For example, write down:    What you eat and drink.  How much sleep you get.  Any change to your diet or medicines.  If you have a migraine headache:    Avoid things that make your symptoms worse, such as bright lights.  It may help to lie down in a dark, quiet room.  Do not drive or use heavy machinery.  Ask your health care provider what activities are safe for you while you are experiencing symptoms.  Keep all follow-up visits as told by your health care provider. This is important.    Contact a health care provider if:  You develop symptoms that are different or more severe than your usual migraine headache symptoms.  You have more than 15 headache days in one month.    Get help right away if:  Your migraine headache becomes severe.  Your migraine headache lasts longer than 72 hours.  You have a fever.  You have a stiff neck.  You have vision loss.  Your muscles feel weak or like you cannot control them.  You start to lose your balance often.  You have trouble walking.  You faint.  You have a seizure.    Summary  A migraine headache is an intense, throbbing pain on one side or both sides of the head. Migraines may also cause other symptoms, such as nausea, vomiting, and sensitivity to light and noise.  This condition may be treated with medicines and lifestyle changes. You may also need to avoid certain things that trigger a migraine headache.  Keep a journal to find out what may trigger your migraine headaches.  Contact your health care provider if you have more than 15 headache days in a month or you develop symptoms that are different or more severe than your usual migraine headache symptoms.    ADDITIONAL NOTES AND INSTRUCTIONS    Please follow up with your Primary MD in 24-48 hr.  Seek immediate medical care for any new/worsening signs or symptoms.

## 2024-09-28 NOTE — ED PROVIDER NOTE - PATIENT PORTAL LINK FT
You can access the FollowMyHealth Patient Portal offered by [facility] by registering at the following website: [email or web address]. By joining [employer]’s FollowMyHealth portal, you will also be able to view your health information using other applications (apps) compatible with our system.

## 2024-09-28 NOTE — ED PROVIDER NOTE - PROGRESS NOTE DETAILS
Patient reassured that she is not leaking CSF into her brain.  She states that was the main reason she came to the ED.  Her headache has improved.  Return precautions discussed.  Patient advised to follow up with her neurologist.

## 2024-10-01 ENCOUNTER — EMERGENCY (EMERGENCY)
Facility: HOSPITAL | Age: 19
LOS: 1 days | Discharge: HOME | End: 2024-10-01
Attending: EMERGENCY MEDICINE | Admitting: EMERGENCY MEDICINE
Payer: MEDICAID

## 2024-10-01 VITALS
DIASTOLIC BLOOD PRESSURE: 77 MMHG | WEIGHT: 132.28 LBS | OXYGEN SATURATION: 100 % | SYSTOLIC BLOOD PRESSURE: 114 MMHG | RESPIRATION RATE: 16 BRPM | HEART RATE: 79 BPM | HEIGHT: 63 IN | TEMPERATURE: 98 F

## 2024-10-01 VITALS
DIASTOLIC BLOOD PRESSURE: 76 MMHG | HEART RATE: 79 BPM | TEMPERATURE: 99 F | OXYGEN SATURATION: 100 % | SYSTOLIC BLOOD PRESSURE: 114 MMHG | RESPIRATION RATE: 16 BRPM

## 2024-10-01 PROCEDURE — 99284 EMERGENCY DEPT VISIT MOD MDM: CPT | Mod: 25

## 2024-10-01 PROCEDURE — 99282 EMERGENCY DEPT VISIT SF MDM: CPT

## 2024-10-01 RX ORDER — FLUORESCEIN SODIUM 2 %
1 DROPS OPHTHALMIC (EYE) ONCE
Refills: 0 | Status: ACTIVE | OUTPATIENT
Start: 2024-10-01 | End: 2024-10-01

## 2024-10-01 RX ORDER — TETRACAINE HCL 0.5 %
1 DROPS OPHTHALMIC (EYE) ONCE
Refills: 0 | Status: ACTIVE | OUTPATIENT
Start: 2024-10-01 | End: 2024-10-01

## 2024-10-01 NOTE — ED ADULT NURSE NOTE - OBJECTIVE STATEMENT
Pt presents to the ED with reports of having pain in her eyes intermittently. Pt also states she has a little double vision when she looks out of the corners of her eyes. Pt reports h/o migraines and dry eyes. No headache at this time, visual acuity is 20/20 in both eyes.

## 2024-10-01 NOTE — ED PROVIDER NOTE - OBJECTIVE STATEMENT
19-year-old female presenting with intermittent eye burning and dry eye sensation and sometimes pain.  Patient does not wear contact lens.  Patient states she saw an eye doctor a month ago and was told that she has dry eyes.  Patient is not applying lubricant drops in her eye.  Denies discharge.  Denies itching.  Denies any vision change.

## 2024-10-01 NOTE — ED PROVIDER NOTE - CLINICAL SUMMARY MEDICAL DECISION MAKING FREE TEXT BOX
19-year-old female presenting with occasional burning and sometimes discomfort.  Diagnosed with dry eyes warmth ago by her eye doctor.  Not compliant with lubricant drops.  Vision is 20/20.  Fluorescein exam is negative.  Patient advised to apply over-the-counter lubricant eyedrops.  Advised follow-up with her eye doctor.

## 2024-10-01 NOTE — ED ADULT TRIAGE NOTE - CHIEF COMPLAINT QUOTE
"I have pain in my eyes. The pain switches from eye to eye and I have double vision a tiny bit when I look sideways"

## 2024-10-01 NOTE — ED ADULT NURSE NOTE - CHPI ED NUR SYMPTOMS NEG
no blurred vision/no discharge/no drainage/no eye lid swelling/no foreign body/no itching/no photophobia/no purulent drainage

## 2024-10-01 NOTE — ED ADULT TRIAGE NOTE - SPO2 (%)
13.5   12.0  )-----------( 306      ( 03 May 2019 21:26 )             42.2       05-03    140  |  102  |  13.0  ----------------------------<  111  3.8   |  28.0  |  0.87    Ca    9.0      03 May 2019 21:26    TPro  6.8  /  Alb  3.5  /  TBili  0.3<L>  /  DBili  x   /  AST  26  /  ALT  34  /  AlkPhos  87  05-03
100

## 2024-10-01 NOTE — ED PROVIDER NOTE - NSFOLLOWUPINSTRUCTIONS_ED_ALL_ED_FT
Follow-up with your eye doctor if your symptoms continue.  Apply over-the-counter lubricating drops to your eyes as needed for dry eyes.  Avoid rubbing your eyes.    Dry Eye  An eye highlighting the lacrimal gland.  Dry eye, also called keratoconjunctivitis sicca, is a condition caused by dryness of the membranes surrounding the eye. It happens when there are not enough healthy, natural tears in the eyes. The eyes must remain moist at all times for good comfort and vision. A small amount of tears is constantly produced by the tear glands (lacrimal glands). These glands are mainly located under the outside part of the upper eyelids. The eyelids produce oils that coat the tears to keep them from evaporating quickly. If the eyelids are inflamed (blepharitis), the lack of healthy oils can make the dry eye worse.    Dry eye can happen on its own or be a symptom of several conditions, such as rheumatoid arthritis, lupus, or Sjögren's syndrome. Dry eye may be mild to severe.    What are the causes?  This condition may be caused by:  Not making enough tears (aqueous tear-deficient dry eyes).  Tears evaporating from the eyes too quickly (evaporative dry eyes). This is when there is an abnormality in the quality of your tears, especially the oils. This abnormality causes your tears to evaporate so quickly that the eyes cannot be kept moist.  What increases the risk?  You are more likely to develop this condition if you:  Are a woman, especially if you have gone through menopause.  Are older.  Live in a dry climate.  Live or work in a [place] or smoky area.  Take certain medicines, such as:  Anti-allergy medicines (antihistamines).  Blood pressure medicines (antihypertensives), especially "water pills" (diuretics).  Birth control pills (oral contraceptives).  Laxatives.  Tranquilizers.  Have eyelid inflammation (blepharitis).  Have a history of refractive eye surgery, such as LASIK.  Have a history of long-term contact lens use.  What are the signs or symptoms?  A normal eye and an eye affected by dry eye.  Symptoms of this condition include:  Irritation. You may feel:  Itchiness.  Burning.  A feeling as though something is stuck in the eye.  Redness.  Inflammation of the eyelids.  Light sensitivity.  Increased sensitivity and discomfort when wearing contact lenses.  Vision that varies throughout the day.  Occasional excessive tearing.  How is this diagnosed?  This condition is diagnosed based on your symptoms, your medical history, and an eye exam.  Your health care provider may look at your eye using a microscope and may put dyes in your eye to check the health of the surface of your eye.  You may have tests, such as a test to evaluate your tear production (Schirmer test). During this test:  A small strip of special paper is gently pressed partly under your lower eyelid.  Your tear production is measured by how much of the paper is moistened by your tears during a set amount of time.  You may be referred to a health care provider who specializes in medical and surgical eye care (ophthalmologist).    How is this treated?  Treatment for this condition depends on the type and severity of the dry eyes. To help relieve your symptoms, your health care provider may recommend over-the-counter artificial tears. Artificial tears either come in bottles that have mild preservatives or in small vials or bottles without preservatives. Patients with mild dry eye may do well with tears that have preservatives, while those with more severe dry eye should just use tears without preservatives. Note that one vial may be used several times a day, but should be discarded at the end of the day.  If your condition is severe, treatment may also include:  Prescription eye drops.  Over-the-counter or prescription gels or ointments to moisten your eyes.  A prescription nasal spray that increases tear production.  Minor surgery to place plugs into the tear drainage ducts. This keep tears from exiting the eye so that tears can stay on the surface of the eye longer.  Medicines to reduce inflammation of the eyelids.  Taking an omega-3 fatty acid nutritional supplement.  Other treatments include making tears from your own blood (autologous serum tears), wearing special contact lenses, and even having minor surgery to partially close the outer parts of your eyelids to decrease evaporation.  Follow these instructions at home:  Take or apply over-the-counter and prescription medicines only as told by your health care provider. This includes eye drops.  If directed, apply a warm compress to your eyes to help reduce eyelid inflammation. Place a towel over your eyes and gently press the warm compress over your eyes for about 5 minutes, or as long as told by your health care provider.  Drink plenty of fluids to stay well hydrated.  If possible, avoid dry, drafty environments.  Wear sunglasses when outdoors to protect your eyes from the sun and wind.  Use a humidifier at home to increase moisture in the air.  Remember to blink often when reading or using the computer for long periods.  If you wear contact lenses, remove them regularly to give your eyes a break. Always remove your contact lenses before sleeping.  Have a yearly eye exam and vision test.  Keep all follow-up visits. This is important.  Contact a health care provider if:  You have eye pain.  You have pus-like fluid coming from your eye.  Your symptoms get worse or do not improve with treatment.  Get help right away if:  Your vision suddenly changes.  Summary  Dry eye is dryness of the membranes surrounding the eye.  Dry eye can happen on its own or be a symptom of several conditions, such as rheumatoid arthritis, lupus, or Sjögren's syndrome.  This condition is diagnosed based on your symptoms, your medical history, and an eye exam.  Treatment for this condition depends on the type and severity of the dry eye. To help relieve your symptoms, your health care provider may recommend over-the-counter artificial tears.  This information is not intended to replace advice given to you by your health care provider. Make sure you discuss any questions you have with your health care provider.

## 2024-10-02 ENCOUNTER — EMERGENCY (EMERGENCY)
Facility: HOSPITAL | Age: 19
LOS: 1 days | Discharge: HOME | End: 2024-10-02
Attending: EMERGENCY MEDICINE
Payer: MEDICAID

## 2024-10-02 VITALS
RESPIRATION RATE: 16 BRPM | SYSTOLIC BLOOD PRESSURE: 114 MMHG | DIASTOLIC BLOOD PRESSURE: 76 MMHG | TEMPERATURE: 98 F | OXYGEN SATURATION: 100 % | HEART RATE: 82 BPM

## 2024-10-02 VITALS
SYSTOLIC BLOOD PRESSURE: 125 MMHG | OXYGEN SATURATION: 100 % | RESPIRATION RATE: 20 BRPM | DIASTOLIC BLOOD PRESSURE: 86 MMHG | HEART RATE: 89 BPM | HEIGHT: 63 IN | WEIGHT: 126.99 LBS | TEMPERATURE: 99 F

## 2024-10-02 PROCEDURE — 99283 EMERGENCY DEPT VISIT LOW MDM: CPT

## 2024-10-02 PROCEDURE — 99282 EMERGENCY DEPT VISIT SF MDM: CPT

## 2024-10-04 ENCOUNTER — EMERGENCY (EMERGENCY)
Facility: HOSPITAL | Age: 19
LOS: 1 days | Discharge: HOME | End: 2024-10-04
Attending: EMERGENCY MEDICINE | Admitting: EMERGENCY MEDICINE
Payer: MEDICAID

## 2024-10-04 VITALS
DIASTOLIC BLOOD PRESSURE: 80 MMHG | TEMPERATURE: 99 F | SYSTOLIC BLOOD PRESSURE: 128 MMHG | WEIGHT: 126.99 LBS | HEIGHT: 63 IN | RESPIRATION RATE: 16 BRPM | OXYGEN SATURATION: 98 %

## 2024-10-04 VITALS
OXYGEN SATURATION: 99 % | TEMPERATURE: 98 F | DIASTOLIC BLOOD PRESSURE: 70 MMHG | RESPIRATION RATE: 16 BRPM | HEART RATE: 78 BPM | SYSTOLIC BLOOD PRESSURE: 117 MMHG

## 2024-10-04 DIAGNOSIS — Z78.9 OTHER SPECIFIED HEALTH STATUS: Chronic | ICD-10-CM

## 2024-10-04 PROBLEM — G43.909 MIGRAINE, UNSPECIFIED, NOT INTRACTABLE, WITHOUT STATUS MIGRAINOSUS: Chronic | Status: ACTIVE | Noted: 2024-09-27

## 2024-10-04 LAB
ALBUMIN SERPL ELPH-MCNC: 4.2 G/DL — SIGNIFICANT CHANGE UP (ref 3.3–5)
ALP SERPL-CCNC: 60 U/L — SIGNIFICANT CHANGE UP (ref 30–120)
ALT FLD-CCNC: 18 U/L — SIGNIFICANT CHANGE UP (ref 10–60)
ANION GAP SERPL CALC-SCNC: 6 MMOL/L — SIGNIFICANT CHANGE UP (ref 5–17)
AST SERPL-CCNC: 19 U/L — SIGNIFICANT CHANGE UP (ref 10–40)
BASOPHILS # BLD AUTO: 0.05 K/UL — SIGNIFICANT CHANGE UP (ref 0–0.2)
BASOPHILS NFR BLD AUTO: 0.7 % — SIGNIFICANT CHANGE UP (ref 0–2)
BILIRUB SERPL-MCNC: 0.4 MG/DL — SIGNIFICANT CHANGE UP (ref 0.2–1.2)
BUN SERPL-MCNC: 10 MG/DL — SIGNIFICANT CHANGE UP (ref 7–23)
CALCIUM SERPL-MCNC: 8.5 MG/DL — SIGNIFICANT CHANGE UP (ref 8.4–10.5)
CHLORIDE SERPL-SCNC: 103 MMOL/L — SIGNIFICANT CHANGE UP (ref 96–108)
CO2 SERPL-SCNC: 27 MMOL/L — SIGNIFICANT CHANGE UP (ref 22–31)
CREAT SERPL-MCNC: 0.7 MG/DL — SIGNIFICANT CHANGE UP (ref 0.5–1.3)
EGFR: 126 ML/MIN/1.73M2 — SIGNIFICANT CHANGE UP
EOSINOPHIL # BLD AUTO: 0.06 K/UL — SIGNIFICANT CHANGE UP (ref 0–0.5)
EOSINOPHIL NFR BLD AUTO: 0.9 % — SIGNIFICANT CHANGE UP (ref 0–6)
GLUCOSE SERPL-MCNC: 93 MG/DL — SIGNIFICANT CHANGE UP (ref 70–99)
HCG UR QL: NEGATIVE — SIGNIFICANT CHANGE UP
HCT VFR BLD CALC: 34.9 % — SIGNIFICANT CHANGE UP (ref 34.5–45)
HGB BLD-MCNC: 11.6 G/DL — SIGNIFICANT CHANGE UP (ref 11.5–15.5)
IMM GRANULOCYTES NFR BLD AUTO: 0.3 % — SIGNIFICANT CHANGE UP (ref 0–0.9)
LYMPHOCYTES # BLD AUTO: 1.65 K/UL — SIGNIFICANT CHANGE UP (ref 1–3.3)
LYMPHOCYTES # BLD AUTO: 24.2 % — SIGNIFICANT CHANGE UP (ref 13–44)
MCHC RBC-ENTMCNC: 28.3 PG — SIGNIFICANT CHANGE UP (ref 27–34)
MCHC RBC-ENTMCNC: 33.2 G/DL — SIGNIFICANT CHANGE UP (ref 32–36)
MCV RBC AUTO: 85.1 FL — SIGNIFICANT CHANGE UP (ref 80–100)
MONOCYTES # BLD AUTO: 0.39 K/UL — SIGNIFICANT CHANGE UP (ref 0–0.9)
MONOCYTES NFR BLD AUTO: 5.7 % — SIGNIFICANT CHANGE UP (ref 2–14)
NEUTROPHILS # BLD AUTO: 4.65 K/UL — SIGNIFICANT CHANGE UP (ref 1.8–7.4)
NEUTROPHILS NFR BLD AUTO: 68.2 % — SIGNIFICANT CHANGE UP (ref 43–77)
NRBC # BLD: 0 /100 WBCS — SIGNIFICANT CHANGE UP (ref 0–0)
PLATELET # BLD AUTO: 279 K/UL — SIGNIFICANT CHANGE UP (ref 150–400)
POTASSIUM SERPL-MCNC: 3.6 MMOL/L — SIGNIFICANT CHANGE UP (ref 3.5–5.3)
POTASSIUM SERPL-SCNC: 3.6 MMOL/L — SIGNIFICANT CHANGE UP (ref 3.5–5.3)
PROT SERPL-MCNC: 7.1 G/DL — SIGNIFICANT CHANGE UP (ref 6–8.3)
RBC # BLD: 4.1 M/UL — SIGNIFICANT CHANGE UP (ref 3.8–5.2)
RBC # FLD: 12.7 % — SIGNIFICANT CHANGE UP (ref 10.3–14.5)
SODIUM SERPL-SCNC: 136 MMOL/L — SIGNIFICANT CHANGE UP (ref 135–145)
WBC # BLD: 6.82 K/UL — SIGNIFICANT CHANGE UP (ref 3.8–10.5)
WBC # FLD AUTO: 6.82 K/UL — SIGNIFICANT CHANGE UP (ref 3.8–10.5)

## 2024-10-04 PROCEDURE — 36415 COLL VENOUS BLD VENIPUNCTURE: CPT

## 2024-10-04 PROCEDURE — 80053 COMPREHEN METABOLIC PANEL: CPT

## 2024-10-04 PROCEDURE — 70487 CT MAXILLOFACIAL W/DYE: CPT | Mod: MC

## 2024-10-04 PROCEDURE — 99285 EMERGENCY DEPT VISIT HI MDM: CPT

## 2024-10-04 PROCEDURE — 81025 URINE PREGNANCY TEST: CPT

## 2024-10-04 PROCEDURE — 70487 CT MAXILLOFACIAL W/DYE: CPT | Mod: 26,MC

## 2024-10-04 PROCEDURE — 99284 EMERGENCY DEPT VISIT MOD MDM: CPT | Mod: 25

## 2024-10-04 PROCEDURE — 85025 COMPLETE CBC W/AUTO DIFF WBC: CPT

## 2024-10-04 PROCEDURE — 96360 HYDRATION IV INFUSION INIT: CPT | Mod: XU

## 2024-10-04 RX ORDER — MECLIZINE HYDROCLORIDE 25 MG/1
25 TABLET ORAL ONCE
Refills: 0 | Status: COMPLETED | OUTPATIENT
Start: 2024-10-04 | End: 2024-10-04

## 2024-10-04 RX ORDER — CLINDAMYCIN PHOSPHATE 150 MG/ML
0 VIAL (ML) INJECTION
Refills: 0 | DISCHARGE

## 2024-10-04 RX ORDER — ONDANSETRON HCL/PF 4 MG/2 ML
4 VIAL (ML) INJECTION ONCE
Refills: 0 | Status: COMPLETED | OUTPATIENT
Start: 2024-10-04 | End: 2024-10-04

## 2024-10-04 RX ORDER — SUMATRIPTAN SUCCINATE 100 MG/1
0 TABLET ORAL
Refills: 0 | DISCHARGE

## 2024-10-04 RX ORDER — SODIUM CHLORIDE 0.9 % (FLUSH) 0.9 %
1000 SYRINGE (ML) INJECTION ONCE
Refills: 0 | Status: COMPLETED | OUTPATIENT
Start: 2024-10-04 | End: 2024-10-04

## 2024-10-04 RX ORDER — RIMEGEPANT SULFATE 75 MG/75MG
1 TABLET, ORALLY DISINTEGRATING ORAL
Refills: 0 | DISCHARGE

## 2024-10-04 RX ADMIN — Medication 2000 MILLILITER(S): at 13:24

## 2024-10-04 RX ADMIN — Medication 1000 MILLILITER(S): at 14:24

## 2024-10-04 RX ADMIN — MECLIZINE HYDROCLORIDE 25 MILLIGRAM(S): 25 TABLET ORAL at 14:44

## 2024-10-04 NOTE — ED ADULT NURSE NOTE - OBJECTIVE STATEMENT
Pt is alert and oriented. Pt states that she got her septum pierced in August. Pt states that she was evaluated and placed on abx. Pt states that urgent care swabbed her nose and the abx that she was on is resistant to the bacteria in her nose. Septum piercing still in place. Pt has white lumps in her bilateral nostrils. Pt states that she has a slight headache, nose pain and dizziness. Pt denies sob, chest pain, nausea, vomiting, and fevers. Pt resp are even and unlabored, skin color nika for race. Pt updated on plan of care.

## 2024-10-04 NOTE — ED PROVIDER NOTE - NSICDXPASTMEDICALHX_GEN_ALL_CORE_FT
PAST MEDICAL HISTORY:  Anxiety     Attention deficit hyperactivity disorder     Autism spectrum disorder     Malnutrition     Migraine

## 2024-10-04 NOTE — ED PROVIDER NOTE - CLINICAL SUMMARY MEDICAL DECISION MAKING FREE TEXT BOX
19-year-old female with a history of chronic migraines, anxiety, ADHD, autism spectrum presents with has been having some nasal discomfort over the past several weeks.  The patient states that she was concerned about infection after changing her nasal piercing.  The patient then followed up with urgent care, and then saw ENT.  ENT felt that there was no acute infection.  The patient later followed up with the urgent care again, and had a culture sent.  The culture grew Staph epidermidis/bacillus, and the patient was taking clindamycin.  No further fever.  Patient came to the ER today as she was feeling some generalized fatigue, and nasal discomfort persistent.  No known fever or chills.  No chest pain or shortness of breath.  No cough/URI.  No aggravating or alleviating factors otherwise noted.  No other acute complaints.  Exam: Nontoxic, well-appearing.  Normal respiratory effort.  No acute external nasal swelling.  No nasal discharge.  Positive enlargement of the mucosa on both sides of the nasal septum and the deeper nose.  No bleeding.  No external nasal swelling.  No other facial swelling.  No other acute findings on exam.  Acute nasal swelling, with a nasal piercing in place.  Will check labs, CT face, outpatient follow-up

## 2024-10-04 NOTE — ED PROVIDER NOTE - PATIENT PORTAL LINK FT
You can access the FollowMyHealth Patient Portal offered by [facility] by registering at the following website: [email or web address]. By joining [employer]’s FollowMyHealth portal, you will also be able to view your health information using other applications (apps) compatible with our system.

## 2024-10-04 NOTE — ED PROVIDER NOTE - OBJECTIVE STATEMENT
18 yo female with history of anxiety, migraines ADHD presents to the ED complaining of nasal discomfort over the past few weeks.  Patient was concerned with infection after changing her nasal piercing.  Patient went to ENT and then urgent care.  Urgent care sent a nasal swab which was positive for Staph epidermidis and bacillus species.  Patient was originally put on clindamycin however based on sensitivities of culture there is resistance.  Culture is sensitive to Cipro and Levaquin.  Patient complaining of mild headache, dizziness and nausea.  No purulent drainage from nose.  Septum piercing was done several months ago however she recently changed the jewelry 2 weeks ago.  Denies fever, chills, visual changes, chest pain, shortness of breath, abdominal pain, nausea, vomiting, upper or lower extremity weakness or paresthesias.

## 2024-10-04 NOTE — ED PROVIDER NOTE - CARE PROVIDER_API CALL
Jeffry Metcalf  Otolaryngology  [address]  [place] [address]  Phone: [phone]  Fax: [phone]  Follow Up Time: 1-3 Days

## 2024-10-04 NOTE — ED PROVIDER NOTE - ENMT, MLM
Airway patent, Mouth with normal mucosa. Throat has no vesicles, no oropharyngeal exudates and uvula is midline. no external nasal swelling. + mild nasal mucous swelling. no visible abscess. no purulent discharge.

## 2024-10-05 ENCOUNTER — EMERGENCY (EMERGENCY)
Facility: HOSPITAL | Age: 19
LOS: 1 days | Discharge: HOME | End: 2024-10-05
Attending: EMERGENCY MEDICINE | Admitting: EMERGENCY MEDICINE
Payer: MEDICAID

## 2024-10-05 VITALS
RESPIRATION RATE: 16 BRPM | OXYGEN SATURATION: 100 % | WEIGHT: 132.94 LBS | SYSTOLIC BLOOD PRESSURE: 127 MMHG | TEMPERATURE: 98 F | DIASTOLIC BLOOD PRESSURE: 81 MMHG | HEART RATE: 85 BPM | HEIGHT: 63 IN

## 2024-10-05 DIAGNOSIS — Z78.9 OTHER SPECIFIED HEALTH STATUS: Chronic | ICD-10-CM

## 2024-10-05 PROCEDURE — 99283 EMERGENCY DEPT VISIT LOW MDM: CPT

## 2024-10-05 RX ORDER — ACETAMINOPHEN 325 MG
975 TABLET ORAL ONCE
Refills: 0 | Status: COMPLETED | OUTPATIENT
Start: 2024-10-05 | End: 2024-10-05

## 2024-10-05 RX ADMIN — Medication 975 MILLIGRAM(S): at 19:58
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
## 2024-10-05 NOTE — ED PROVIDER NOTE - ATTENDING APP SHARED VISIT CONTRIBUTION OF CARE
Juan A Fonseca MD: I have personally performed a face to face diagnostic evaluation on this patient.  I have reviewed the PA note and agree with the history, exam, and plan of care, except as noted.  History and Exam by me shows same findings as documented

## 2024-10-05 NOTE — ED PROVIDER NOTE - MUSCULOSKELETAL, MLM
Pharmacist Admission Medication Reconciliation Pending Note    Prior to Admission Medications were reviewed by the pharmacist and pended for provider review during admission medication reconciliation.    Medications were pended by the pharmacist at this time as follows:    Pended Admission Order Reconciliation Actions     Order Name Action Reordered As    aspirin 81 MG chewable tablet Order for Admission aspirin chewable 81 mg    diphenhydrAMINE (BENADRYL) 25 MG capsule Order for Admission diphenhydrAMINE (BENADRYL) capsule 50 mg    ibuprofen (MOTRIN) 200 MG tablet Order for Admission ibuprofen (MOTRIN) tablet 600-800 mg    amLODIPine (NORVASC) 5 MG tablet Order for Admission amLODIPine (NORVASC) tablet 5 mg    ondansetron (ZOFRAN ODT) 4 MG disintegrating tablet Order for Admission ondansetron (ZOFRAN ODT) disintegrating tablet 4 mg    omeprazole 20 MG tablet Order for Admission pantoprazole (PROTONIX) EC tablet 40 mg    glipiZIDE (GLUCOTROL) 5 MG tablet Order for Admission glipiZIDE (GLUCOTROL) tablet 5 mg    lisinopril-hydrochlorothiazide (PRINZIDE,ZESTORETIC) 20-25 MG per tablet Order for Admission lisinopril-hydroCHLOROthiazide (PRINZIDE,ZESTORETIC) 20-25 MG per tablet 1 tablet    Dulaglutide (TRULICITY) 0.75 MG/0.5ML Solution Pen-injector Do Not Order for Admission             Orders Pended To Continue For Hospital Stay     ID Description Pended By When Reason    [id number] amLODIPine (NORVASC) tablet 5 mg-[facility]ILY [facility]     [id number] aspirin chewable 81 mg-NIGHTLY [facility]     [id number] diphenhydrAMINE (BENADRYL) capsule 50 mg-3 TIMES DAILY [facility]N [facility]     [id number] glipiZIDE (GLUCOTROL) tablet 5 mg-DAILY BEFORE BREAKFAST [facility]     [id number] ibuprofen (MOTRIN) tablet 600-800 mg-EVERY 6 HOURS [facility]     [id number] lisinopril-hydroCHLOROthiazide (PRINZIDE,ZESTORETIC) 20-25 MG per tablet 1  tablet-DAILY David Bonilla [facility] 04/06/17 0406     [id number] pantoprazole (PROTONIX) EC tablet 40 mg-DAILY BEFORE BREAKFAST David Bonilla [facility] 04/06/17 0406     [id number] ondansetron (ZOFRAN ODT) disintegrating tablet 4 mg-2 TIMES DAILY PRN David Bonilla [facility] 04/06/17 0406             Pharmacist Notations:           Orders that are ultimately reconciled and signed during admission medication reconciliation may differ from the pended actions above.    Please contact the pharmacist for questions.    David Bonilla [facility]  4/6/2017 4:06 AM   no midline tenderness, FROM neck and extremities

## 2024-10-05 NOTE — ED PROVIDER NOTE - NSFOLLOWUPINSTRUCTIONS_ED_ALL_ED_FT
WHAT YOU NEED TO KNOW:    What is a concussion? A concussion is a mild brain injury. It is usually caused by a bump or blow to the head from a fall, a motor vehicle crash, or a sports injury. Being shaken forcefully may also cause a concussion.    What are the signs and symptoms of a concussion? Symptoms may happen right away, or they may develop days after the concussion:    Headache    Dizziness, loss of balance, or blurry vision    Nausea or vomiting    A change in mood, such as restlessness or irritability    Trouble thinking, remembering things, or concentrating    Ringing in the ears    Drowsiness or decreased energy    Changes in your normal sleeping pattern  How is a concussion diagnosed? Your healthcare provider will examine you and ask about your symptoms. Tell your provider when and how you were injured. You may need any of the following:    A neurologic exam is also called neuro signs, neuro checks, or neuro status. A neurologic exam can show healthcare providers how well your brain works after your injury. Healthcare providers will check how your pupils react to light. They may check your memory and how easily you wake up. Your hand grasp and balance may also be tested.    CT or MRI pictures may be taken of your head. You may be given contrast liquid to help the pictures show up better. Tell the healthcare provider if you have ever had an allergic reaction to contrast liquid. Do not enter the MRI room with anything metal. Metal can cause serious injury. Tell the healthcare provider if you have any metal in or on your body.  How is a concussion managed? Usually no treatment is needed for a mild concussion. Concussion symptoms usually go away within 10 days, but they may last longer. The following may be recommended to manage your symptoms:    Rest from physical and mental activities as directed. Mental activities are those that require thinking, concentration, and attention. You will need to rest until your symptoms are gone. Rest will allow you to recover from your concussion. Ask your healthcare provider when you can return to work and other daily activities.    Have someone stay with you for the first 24 hours after your injury. Your healthcare provider should be contacted if your symptoms get worse, or you develop new symptoms.    Do not participate in sports and physical activities until your healthcare provider says it is okay. These can make your symptoms worse or lead to another concussion. Your healthcare provider will tell you when it is okay for you to return to sports or physical activities. Ask for more information about sports concussions.    Do not use heavy machinery or drive for 24 hours after your injury, or as directed. This can be dangerous and cause a serious accident. Your healthcare provider will tell you when it is safe for you to return to these activities.    Pain medicine may help relieve headache pain. Do not use NSAIDs or aspirin. These can increase your risk for bleeding. Your provider may recommend acetaminophen. Ask how much to take and how often to take it. Follow directions. Read the labels of all other medicines you are using to see if they also contain acetaminophen, or ask your doctor or pharmacist. Acetaminophen can cause liver damage if not taken correctly.  How can I help prevent another concussion?    Wear protective sports equipment that fits properly. Helmets help decrease your risk for a serious brain injury. Talk to your healthcare provider about ways you can lower your risk for a concussion if you play sports.    Wear your seatbelt every time you travel. This helps lower your risk for a head injury if you are in a car accident.  Where can I get more information?    [employer]  [address] [address]  Phone: 1-259.125.7918  Phone: [phone]  Web Address: http://www.[employer]  Call your local emergency number (911 in the ), or have someone call if:    You cannot be woken.    You have a seizure, increasing confusion, or a change in personality.    Your speech becomes slurred.  When should I seek immediate care?    You have sudden or new vision problems.    One of your pupils is bigger than the other.    You have a severe headache that does not go away.    You have arm or leg weakness, numbness, or new problems with coordination.    You have blood or clear fluid coming out of the ears or nose.    You cannot stop vomiting.  When should I call my doctor?    You have nausea or are vomiting.    You feel more sleepy than usual.    Your symptoms get worse.    Your symptoms last longer than 6 weeks.    You have questions or concerns about your condition or care.  CARE AGREEMENT:    You have the right to help plan your care. Learn about your health condition and how it may be treated. Discuss treatment options with your healthcare providers to decide what care you want to receive. You always have the right to refuse treatment.

## 2024-10-05 NOTE — ED PROVIDER NOTE - CLINICAL SUMMARY MEDICAL DECISION MAKING FREE TEXT BOX
Patient c/o some headache a day after minor head injury. Looks well here with normal neuro exam and no external sign of head injury. No indication for CT. Will refer to concussion program

## 2024-10-05 NOTE — ED PROVIDER NOTE - PATIENT PORTAL LINK FT
You can access the FollowMyHealth Patient Portal offered by [facility] by registering at the following website: [email or web address]. By joining [employer]’s FollowMyHealth portal, you will also be able to view your health information using other applications (apps) compatible with our system.

## 2024-10-05 NOTE — ED PROVIDER NOTE - OBJECTIVE STATEMENT
19-year-old female with history of anxiety, ADHD, autism, migraines presents for evaluation after head injury earlier this morning.  States around 12 AM of phone hit her on the left side of head.  States it was not a hard head.  Denies loss of consciousness, acute vision change, neck pain, numbness, weakness, nausea, vomiting.  This is patient's seventh ED visit in the past 2 weeks for various issues including head pain.  Had negative CT of her head about 1 week ago.

## 2024-10-08 ENCOUNTER — EMERGENCY (EMERGENCY)
Facility: HOSPITAL | Age: 19
LOS: 1 days | Discharge: HOME | End: 2024-10-08
Attending: EMERGENCY MEDICINE | Admitting: EMERGENCY MEDICINE
Payer: MEDICAID

## 2024-10-08 VITALS
HEIGHT: 63 IN | HEART RATE: 88 BPM | SYSTOLIC BLOOD PRESSURE: 122 MMHG | DIASTOLIC BLOOD PRESSURE: 83 MMHG | OXYGEN SATURATION: 100 % | RESPIRATION RATE: 16 BRPM | WEIGHT: 132.5 LBS | TEMPERATURE: 98 F

## 2024-10-08 VITALS
HEART RATE: 84 BPM | RESPIRATION RATE: 16 BRPM | DIASTOLIC BLOOD PRESSURE: 80 MMHG | SYSTOLIC BLOOD PRESSURE: 114 MMHG | OXYGEN SATURATION: 100 % | TEMPERATURE: 99 F

## 2024-10-08 DIAGNOSIS — Z78.9 OTHER SPECIFIED HEALTH STATUS: Chronic | ICD-10-CM

## 2024-10-08 PROCEDURE — 99284 EMERGENCY DEPT VISIT MOD MDM: CPT

## 2024-10-08 PROCEDURE — 70450 CT HEAD/BRAIN W/O DYE: CPT | Mod: 26,MC

## 2024-10-08 PROCEDURE — 70450 CT HEAD/BRAIN W/O DYE: CPT | Mod: MC

## 2024-10-08 PROCEDURE — 93010 ELECTROCARDIOGRAM REPORT: CPT

## 2024-10-08 PROCEDURE — 99284 EMERGENCY DEPT VISIT MOD MDM: CPT | Mod: 25

## 2024-10-08 PROCEDURE — 93005 ELECTROCARDIOGRAM TRACING: CPT

## 2024-10-08 NOTE — ED ADULT NURSE NOTE - NSFALLUNIVINTERV_ED_ALL_ED
Bed/Stretcher in lowest position, wheels locked, appropriate side rails in place/Call bell, personal items and telephone in reach/Instruct patient to call for assistance before getting out of bed/chair/stretcher/Non-slip footwear applied when patient is off stretcher/[place] to call system/Physically safe environment - no spills, clutter or unnecessary equipment/Purposeful proactive rounding/Room/bathroom lighting operational, light cord in reach

## 2024-10-08 NOTE — ED PROVIDER NOTE - CLINICAL SUMMARY MEDICAL DECISION MAKING FREE TEXT BOX
19-year-old female history of anxiety autism ADHD migraines status post head injury on the top of her head against a lamp tonight at a friend's house complaining of some headache dizziness and palpitations.  Denies any vomiting admits to some nausea.  No medications taken prior to arrival.  Here for evaluation.  States 3 days ago she also had a head injury during that time.    r/o ich, CT head

## 2024-10-08 NOTE — ED PROVIDER NOTE - OBJECTIVE STATEMENT
19-year-old female history of anxiety autism ADHD migraines status post head injury on the top of her head against a lamp tonight at a friend's house complaining of some headache dizziness and palpitations.  Denies any vomiting admits to some nausea.  No medications taken prior to arrival.  Here for evaluation.  States 3 days ago she also had a head injury during that time.

## 2024-10-08 NOTE — ED PROVIDER NOTE - NSFOLLOWUPINSTRUCTIONS_ED_ALL_ED_FT
1) Follow-up with your Primary Medical Doctor or referred doctor. Call today / next business day for prompt follow-up.  2) Return to Emergency room for any worsening or persistent pain, weakness, fever, or any other concerning symptoms.  3) See attached instruction sheets for additional information, including information regarding signs and symptoms to look out for, reasons to seek immediate care and other important instructions.  4) Follow-up with any specialists as discussed / noted as well.     What do I need to know about a head injury? A head injury can include your scalp, face, skull, or brain and range from mild to severe. Effects can appear immediately after the injury or develop later. The effects may last a short time or be permanent. Healthcare providers may want to check your recovery over time. Treatment may change as you recover or develop new health problems from the head injury.    What are the signs and symptoms of a head injury?    An open scalp or skin wound, swelling, or bruising    Mild to moderate headache    Dizziness or loss of balance    Nausea or vomiting    Ringing in the ears or neck pain    Confusion, especially right after the injury    Change in mood, such as feeling restless or irritable    Trouble thinking, remembering, or concentrating    Drowsiness or decreased amount of energy    Trouble sleeping  How is a head injury diagnosed?    Tell your healthcare provider about your injury and symptoms. Your provider may check how your pupils react to light. Your brain function, memory, hand grasp, and balance may also be checked.    You may need x-rays, a CT scan, or an MRI to check for bleeding or major damage to your skull or brain. You may be given contrast liquid to help the pictures show up better. Tell the healthcare provider if you have ever had an allergic reaction to contrast liquid. Do not enter the MRI room with anything metal. Metal can cause serious injury. Tell the provider if you have any metal in or on your body.  How is a head injury treated? A mild head injury may not need to be treated. You may be given medicine to decrease pain. A concussion, hematoma (collection of blood), or traumatic brain injury may need both immediate and long-term treatment.    How can I manage my symptoms?    Rest or do quiet activities. Limit your time watching TV, using the computer, or doing tasks that require a lot of thinking. Slowly return to your normal activities as directed. Do not play sports or do activities that may cause you to get hit in the head. Ask your healthcare provider when you can return to sports.    Apply ice on your head for 15 to 20 minutes every hour or as directed. Use an ice pack, or put crushed ice in a plastic bag. Cover it with a towel before you apply it. Ice helps decrease swelling and pain.    Have someone stay with you for 24 hours , or as directed. This person can monitor you for problems and call for help if needed. When you are awake, the person should ask you a few questions every few hours to see if you are thinking clearly. An example is to ask your name or address.  What can I do to prevent another head injury?    Wear a helmet that fits properly. Do this when you play sports, or ride a bike, scooter, or skateboard. Helmets help decrease your risk for a serious head injury. Talk to your healthcare provider about other ways you can protect yourself if you play sports.    Wear your seat belt every time you are in a car. This helps lower your risk for a head injury if you are in a car accident.  Call your local emergency number (911 in the ) or have someone call if:    You cannot be woken.    You have a seizure.    You stop responding to others or you faint.    You have blurry or double vision.    Your speech becomes slurred or confused.    You have arm or leg weakness, loss of feeling, or new problems with coordination.    Your pupils are larger than usual, or one pupil is a different size than the other.    You have blood or clear fluid coming out of your ears or nose.  When should I seek immediate care?    You have repeated or forceful vomiting.    You feel confused.    Your headache gets worse or becomes severe.    You or someone caring for you notices that you are harder to wake than usual.  When should I call my doctor?    Your symptoms last longer than 6 weeks after the injury.    You have questions or concerns about your condition or care.  CARE AGREEMENT:    You have the right to help plan your care. Learn about your health condition and how it may be treated. Discuss treatment options with your healthcare providers to decide what care you want to receive. You always have the right to refuse treatment.

## 2024-10-08 NOTE — ED PROVIDER NOTE - PATIENT PORTAL LINK FT
You can access the FollowMyHealth Patient Portal offered by [facility] by registering at the following website: [email or web address]. By joining [employer]’s FollowMyHealth portal, you will also be able to view your health information using other applications (apps) compatible with our system.

## 2024-10-08 NOTE — ED PROVIDER NOTE - PHYSICAL EXAMINATION
Gen: Alert, NAD  Head/eyes: NC/AT, PERRL  ENT: airway patent  Neck: supple  Pulm/lung: Bilateral clear BS  CV/heart: RRR  GI/Abd: soft, NT/ND, +BS, no guarding/rebound tenderness  Musculoskeletal: no edema/erythema/cyanosis  Skin: no rash, no laceration, no abrasion  Neuro: AAOx3, grossly intact

## 2024-10-10 NOTE — PATIENT PROFILE PEDIATRIC. - NSSUBSTANCEUSE_GEN_ALL_CORE_SD
Patient assessed at this time, see shift assessment, reports minimal pain at
this time wanted to take tylenol, will give tylenol, has been passing gas,
denies nausea or vomiting, denies further needs, will continue to monitor. never used

## 2024-10-20 ENCOUNTER — EMERGENCY (EMERGENCY)
Facility: HOSPITAL | Age: 19
LOS: 1 days | Discharge: HOME | End: 2024-10-20
Attending: EMERGENCY MEDICINE | Admitting: EMERGENCY MEDICINE
Payer: MEDICAID

## 2024-10-20 VITALS
TEMPERATURE: 99 F | WEIGHT: 128.09 LBS | SYSTOLIC BLOOD PRESSURE: 109 MMHG | HEIGHT: 63 IN | HEART RATE: 90 BPM | DIASTOLIC BLOOD PRESSURE: 73 MMHG | RESPIRATION RATE: 15 BRPM | OXYGEN SATURATION: 99 %

## 2024-10-20 VITALS
SYSTOLIC BLOOD PRESSURE: 123 MMHG | OXYGEN SATURATION: 98 % | HEART RATE: 88 BPM | TEMPERATURE: 99 F | DIASTOLIC BLOOD PRESSURE: 77 MMHG | RESPIRATION RATE: 16 BRPM

## 2024-10-20 DIAGNOSIS — Z78.9 OTHER SPECIFIED HEALTH STATUS: Chronic | ICD-10-CM

## 2024-10-20 PROCEDURE — 70450 CT HEAD/BRAIN W/O DYE: CPT | Mod: 26,MC

## 2024-10-20 PROCEDURE — 99284 EMERGENCY DEPT VISIT MOD MDM: CPT

## 2024-10-20 PROCEDURE — 72040 X-RAY EXAM NECK SPINE 2-3 VW: CPT | Mod: 26

## 2024-10-20 PROCEDURE — 70450 CT HEAD/BRAIN W/O DYE: CPT | Mod: MC

## 2024-10-20 PROCEDURE — 72040 X-RAY EXAM NECK SPINE 2-3 VW: CPT

## 2024-10-20 RX ORDER — LIDOCAINE 50 MG/G
1 CREAM TOPICAL ONCE
Refills: 0 | Status: COMPLETED | OUTPATIENT
Start: 2024-10-20 | End: 2024-10-20

## 2024-10-20 RX ADMIN — LIDOCAINE 1 PATCH: 50 CREAM TOPICAL at 17:42

## 2024-10-20 NOTE — ED PROVIDER NOTE - PATIENT PORTAL LINK FT
You can access the FollowMyHealth Patient Portal offered by [facility] by registering at the following website: [email or web address]. By joining [employer]’s FollowMyHealth portal, you will also be able to view your health information using other applications (apps) compatible with our system.

## 2024-10-20 NOTE — ED PROVIDER NOTE - NSFOLLOWUPINSTRUCTIONS_ED_ALL_ED_FT
Follow-up with PCP for reevaluation and ongoing care and treatment.  Take over-the-counter Tylenol or Motrin with food as directed for pain.  If having worsening of symptoms or other related symptoms, return to the ER immediately.    Head Injury, Adult  Three rear views of the head showing how quick, sudden head movements injure the brain.  There are many types of head injuries. They can be as minor as a small bump. Some head injuries can be worse. Worse injuries include:  A strong hit to the head that shakes the brain back and forth, causing damage (concussion).  A bruise (contusion) of the brain. This means there is bleeding in the brain that can cause swelling.  A cracked skull (skull fracture).  Bleeding in the brain that gathers, gets thick (makes a clot), and forms a bump (hematoma).  Most problems from a head injury come in the first 24 hours. However, you may still have side effects up to 7–10 days after your injury. It is important to watch your condition for any changes. You may need to be watched in the emergency department or urgent care, or you may need to stay in the hospital.    What are the causes?  There are many possible causes of a head injury. A serious head injury may be caused by:  A car accident.  Bicycle or motorcycle accidents.  Sports injuries.  Falls.  Being hit by an object.  What are the signs or symptoms?  Symptoms of a head injury include a bruise, bump, or bleeding where the injury happened. Other physical symptoms may include:  Headache.  Feeling like you may vomit (nauseous) or vomiting.  Dizziness.  Blurred or double vision.  Being uncomfortable around bright lights or loud noises.  Feeling tired.  Trouble waking up.  Severe symptoms such as:  Feeling weak or numb on one side of the body.  Slurred speech.  Swallowing problems.  Fainting.  Shaking movements that you cannot control (seizures).  Mental or emotional symptoms may include:  Feeling grumpy or cranky.  Confusion and memory problems.  Having trouble paying attention or concentrating.  Changes in eating or sleeping habits.  Feeling worried or nervous (anxious).  Feeling sad (depressed).  How is this treated?  Treatment for this condition depends on how bad the injury is and the type of injury you have. The main goal is to prevent problems and to allow the brain time to heal.    Mild head injury    If you have a mild head injury, you may be sent home, and treatment may include:  Being watched. A responsible adult should stay with you for 24 hours after your injury and check on you often.  Physical rest.  Brain rest.  Pain medicines.  Very bad head injury    If you have a very bad head injury, treatment may include:  Being watched closely. This includes staying in the hospital.  Medicines to:  Help with pain.  Prevent seizures.  Help with brain swelling.  Protecting your airway and using a machine that helps you breathe (ventilator).  Watching for and manage swelling inside the brain.  Brain surgery. This may be needed to:  Remove a collection of blood or blood clots.  Stop the bleeding.  Remove a part of the skull. This allows room for the brain to swell.  Follow these instructions at home:  Activity    Rest.  Avoid activities that are hard or tiring.  Make sure you get enough sleep.  Let your brain rest. Do fewer activities that need a lot of thought or attention, such as:  Watching TV.  Playing memory games and doing puzzles.  Job-related work or homework.  Working on the computer, social media, and texting.  Avoid activities that could cause another head injury until your doctor says it is okay. This includes playing sports.  Ask your doctor when it is safe for you to go back to your normal activities, such as work or school.  Ask your doctor when you can drive, ride a bicycle, or use machines. Do not do these activities if you are dizzy.  Lifestyle    A sign telling the reader not to drink beer, wine, or hard liquor.  Do not drink alcohol until your doctor says it is okay.  Do not use drugs.  If it is hard to remember things, write them down.  If you are easily distracted, try to do one thing at a time.  Talk with family members or close friends when making important decisions.  Tell your friends, family, a trusted co-worker, and  about your injury, symptoms, and limits (restrictions). Have them watch for any problems that are new or getting worse.  General instructions    Take over-the-counter and prescription medicines only as told by your doctor.  Have a responsible adult stay with you for 24 hours after your head injury. This person should watch you for any changes in your symptoms and be ready to get help.  Keep all follow-up visits to catch any new problems early.  How is this prevented?    Having another head injury can be dangerous. Another injury can lead to brain damage, brain swelling, or death. You can avoid this by:  Working on your balance and strength. This can help you avoid falls.  Wearing a seat belt when you are in a moving vehicle.  Wearing a helmet when you:  Ride a bicycle.  Ski.  Do any other sport or activity that has a risk of injury.  Making your home safer by:  Getting rid of clutter from the floors and stairs. This includes things that can make you trip.  Using grab bars in bathrooms and handrails by stairs.  Placing non-slip mats on floors and in bathtubs.  Putting more light in dim areas.  Where to find more information  Brain Injury Association: biausa.org  Contact a doctor if:  These symptoms do not go away:  Headaches.  Dizziness.  Double vision or vision changes.  Trouble sleeping.  Changes in mood.  You have new symptoms.  Get help right away if:  You have sudden:  Headache that is very bad.  Vomiting that does not stop.  Changes in the size of one of your pupils. Pupils are the black centers of your eyes.  Changes in how you see (vision).  More confusion or more grumpy moods.  You have a seizure.  Your symptoms get worse.  You have a clear or bloody fluid coming from your nose or ears.  These symptoms may be an emergency. Get help right away. Call 911.  Do not wait to see if the symptoms will go away.  Do not drive yourself to the hospital.    Cervical Sprain  A cervical sprain is also called a neck sprain. It is a stretch or tear in one or more ligaments in the neck. Ligaments are tissues that connect bones to each other.    Neck sprains can be mild, bad, or very bad. A very bad sprain can cause problems with bones and other structures.    Most neck sprains heal in 4–6 weeks, but this depends on how bad the injury is.    What are the causes?  Neck sprains may be caused by trauma, such as:  An injury from a motor vehicle accident.  A fall.  The head and neck being moved front to back or side to side all of a sudden (whiplash injury).  Mild neck sprains may be caused by wear and tear over time.    What increases the risk?  Some activities put you at risk of hurting your neck. These include:  Contact sports.  Gymnastics.  Diving.  Arthritis caused by wear and tear of the joints in the spine.  The neck not being very strong or flexible.  Having had a neck injury in the past.  Poor posture.  Spending a lot of time in positions that put stress on the neck. This may be from sitting at a computer for a long time.  What are the signs or symptoms?  Signs or symptoms include any of these problems in your neck, shoulders, or upper back:  Pain or tenderness.  Stiffness.  Swelling.  A burning feeling.  Sudden tightening of neck muscles (spasms).  Not being able to move the neck very much.  Headache.  Feeling dizzy.  Feeling like you may vomit, or vomiting.  Having a hand or arm that:  Feels weak.  Loses feeling (feels numb).  Tingles.  How is this treated?  A person wearing a neck collar.   This condition is treated by:  Resting and putting ice or heat on your neck.  Doing exercises to improve movement and strength in the injured area (physical therapy).  In some cases, treatment may also include:  Keeping your neck in place for a length of time. This may be done using:  A neck collar. This supports your chin and the back of your head.  A cervical traction device. This is a sling that holds up your head. It removes weight and pressure from your neck.  Medicines for pain or other symptoms.  Surgery. This is rare.  Follow these instructions at home:  Medicines    Take over-the-counter and prescription medicines only as told by your doctor.  Ask your doctor if you should avoid driving or using machines while you are taking your medicine.  If told, take steps to prevent problems with pooping (constipation). You may need to:  Drink enough fluid to keep your pee (urine) pale yellow.  Take medicines. You will be told what medicines to take.  Eat foods that are high in fiber. These include beans, whole grains, and fresh fruits and vegetables.  Limit foods that are high in fat and sugar. These include fried or sweet foods.  If you have a neck collar:    Wear it as told by your doctor. Do not take it off unless told.  Ask your doctor before adjusting your collar.  If you have long hair, keep it outside of the collar.  If you are allowed to take off the collar for cleaning and bathing:  Follow instructions about how to take it off safely.  Clean it by hand with mild soap and water. Let it air-dry fully.  If your collar has pads that you can take out:  Take the pads out every 1–2 days.  Wash them by hand with soap and water.  Let the pads air-dry fully before you put them back in the collar.  Tell your doctor if your skin under the collar has irritation or sores.  Managing pain, stiffness, and swelling    A bag of ice on a towel on the skin.   A heating pad for use on the affected area.   Use a cervical traction device, if told by your doctor.  If told, put ice on the affected area.  Put ice in a plastic bag.  Place a towel between your skin and the bag.  Leave the ice on for 20 minutes, 2–3 times a day.  If told, put heat on the affected area. Do this before exercise or as often as told by your doctor. Use the heat source that your doctor recommends, such as a moist heat pack or a heating pad.  Place a towel between your skin and the heat source.  Leave the heat on for 20–30 minutes.  If your skin turns bright red, take off the ice or heat right away to prevent skin damage. The risk of damage is higher if you cannot feel pain, heat, or cold.  Activity    Do not drive while wearing a neck collar. If you do not have a neck collar, ask if it is safe to drive while your neck heals.  Do not lift anything that is heavier than 10 lb (4.5 kg).  Rest as told by your doctor.  Avoid positions and activities that make you feel worse.  Do exercises as told by your doctor or physical therapist.  Return to your normal activities when your doctor says that it is safe.  General instructions    Do not smoke or use any products that contain nicotine or tobacco. These can delay healing. If you need help quitting, ask your doctor.  Keep all follow-up visits. Your doctor will check your injury and activity level.  How is this prevented?  Use good posture. Adjust your workstation to help with this.  Exercise often as told by your doctor or physical therapist.  Avoid activities that are risky or may cause a neck sprain.  Contact a doctor if:  Your symptoms get worse or do not get better after 2 weeks.  Your pain gets worse.  Medicine does not help your pain.  You have new symptoms.  Your neck collar gives you sores on your skin or bothers your skin.  Get help right away if:  You have very bad pain.  You get any of the following in any part of your body:  Loss of feeling.  Tingling.  Weakness.  You cannot move a part of your body.  You have neck pain and either of these:  Very bad dizziness.  A very bad headache.

## 2024-10-20 NOTE — ED PROVIDER NOTE - PROVIDER TOKENS
PROVIDER:[TOKEN:[[id number]],FOLLOWUP:[1-3 Days]],PROVIDER:[TOKEN:[[id number]],FOLLOWUP:[1-3 Days]],FREE:[LAST:[YOUR PCP],PHONE:[(   )    -],FAX:[(   )    -],FOLLOWUP:[1-3 Days]]

## 2024-10-20 NOTE — ED PROVIDER NOTE - NSFOLLOWUPCLINICS_GEN_ALL_ED_FT
Health system General Internal Medicine  General Internal Medicine  [address] [place] [address]  Phone: [phone]  Fax:   Follow Up Time: 1-3 Days    [facility]  Behavioral Health  [address] [place] [address]  Phone: [phone]  Fax:   Follow Up Time: 1-3 Days

## 2024-10-20 NOTE — ED ADULT NURSE NOTE - NSFALLUNIVINTERV_ED_ALL_ED
Bed/Stretcher in lowest position, wheels locked, appropriate side rails in place/Call bell, personal items and telephone in reach/Instruct patient to call for assistance before getting out of bed/chair/stretcher/Non-slip footwear applied when patient is off stretcher/[place] to call system/Physically safe environment - no spills, clutter or unnecessary equipment/Purposeful proactive rounding/Room/bathroom lighting operational, light cord in reach

## 2024-10-20 NOTE — ED PROVIDER NOTE - CARE PROVIDERS DIRECT ADDRESSES
,DirectAddress_Unknown,[email or web address],DirectAddress_Unknown

## 2024-10-20 NOTE — ED ADULT NURSE NOTE - OBJECTIVE STATEMENT
Pt came in ambulatory complains of posterior neck and occipital pain after she  accidently bumped her head on to a window last night. Pt also complains of nausea , blurring of vision and dizziness. No vomiting or fever noted

## 2024-10-20 NOTE — ED ADULT NURSE NOTE - SUICIDE SCREENING DEPRESSION
Patient Specific Counseling (Will Not Stick From Patient To Patient): Atopic dermatitis is a chronic skin condition.\\nEducation on atopic dermatitis was provided, including pathophysiology, association with other atopic disorders, the role of food allergens, the role of moisturization, topical steroids, and irritant avoidance.\\n\\nTreatment Recommendations: \\nSpot-treat all eczema BID with the prescribed medications.\\nMoisturize whole body BID with Vanicream ointment.\\nUse Vanicream Body Wash for bathing.
Detail Level: Generalized
Negative

## 2024-10-20 NOTE — ED ADULT NURSE NOTE - CADM POA URETHRAL CATHETER
Assessment & Plan     Patient is a very pleasant 84-year-old female who is presenting today to follow-up from a recent emergency department visit.  Patient has had ongoing upper abdominal pain and heartburn that has been uncontrolled.  She was found to be in atrial flutter during her emergency department visit, and she has been in a flutter before.  She was given a prescription for Carafate, continued on Nexium, and her as needed Pepcid.  In the interim, she has met with GI on 2/6/2023, who changed her from Nexium to omeprazole, scheduled her Pepcid every evening (which has been causing some nausea), and encouraged her to continue to take Carafate.    Type 2 diabetes mellitus with stage 3a chronic kidney disease, without long-term current use of insulin (H)  Patient requests recheck of A1c. Unfortunately, this is increased. Given her age, and other chronic conditions, however, goal A1c of <8. Recheck in 3-6 months.   - Hemoglobin A1c    Fatigue, unspecified type  Patient with fatigue ongoing. She certainly has multiple conditions that could be contributing including a fib/flutter, CKD. As below, does have elevated BNP at this point. B12 is stable since 10 years ago. Vitamin d is low, as is hemoglobin (see plans below). If symptoms do not improve on treatment, return for further discussion.   - Vitamin B12  - Vitamin D deficiency screening  - CBC with Platelets and Reflex to Iron Studies  - Ferritin  - Iron & Iron Binding Capacity  - ferrous sulfate (FEROSUL) 325 (65 Fe) MG tablet  Dispense: 45 tablet; Refill: 3    Leg swelling  Patients notes she has had leg swelling that is worsening since her ED visit. Had echo on 10/7/22 that had normal EF 55-60%. Was in a fib at the time. Mild to moderate tricusp regurg, otherwise largely  Normal. BNP obtained today and quite elevated in the 7,000s, increased from 1200s in December. This could represent HFpEF. Highly encouraged patient to take her torsemide daily for the next  few days, then monitor weight and take torsemide as needed. Could discuss this further at her upcoming appointment with cardiology. She may need more regular diuretic use. Reassuring on exam is lack of crackles. Her fluid overload could certainly be contributing to fatigue she is experiencing.   - BNP-N terminal pro  - Basic metabolic panel    Anxiety  Refill. Very infrequently needs these. Discussed symptoms to monitor given age and risk of side effects with benzos. Was given rx for oxycodone in December. Would avoid concurrent use of benzos and opioids.  - LORazepam (ATIVAN) 0.5 MG tablet  Dispense: 10 tablet; Refill: 0    Vitamin D deficiency  Stage 3a chronic kidney disease (H)  Patient's vitamin d screen as above did come back at 12. This is quite low and will treat with high dose x12 weeks followed by daily vitamin D. Does have history of CKD which is likely contributing to both low vit D and anemia.   - vitamin D2 (ERGOCALCIFEROL) 05981 units (1250 mcg) capsule  Dispense: 12 capsule; Refill: 0  - vitamin D3 (CHOLECALCIFEROL) 50 mcg (2000 units) tablet  Dispense: 90 tablet; Refill: 3    Anemia due to stage 3a chronic kidney disease (H)  Hemoglobin 10.9, remains low. Normal ferritin and iron, however. Given anemia, and history of CKD, will treat with 3x/wk iron supplement.   - ferrous sulfate (FEROSUL) 325 (65 Fe) MG tablet  Dispense: 45 tablet; Refill: 3    Class 2 severe obesity due to excess calories with serious comorbidity and body mass index (BMI) of 37.0 to 37.9 in adult (H)  HCC coding. Has diabetes. Discussed weight monitoring today in the context of fluid overload.     I spent a total of 48 minutes on the day of the visit.   Time spent doing chart review, history and exam, documentation and further activities per the note     MED REC REQUIRED  Post Medication Reconciliation Status:  Discharge medications reconciled and changed, see notes/orders    Return if symptoms worsen or fail to improve.    Naty  "Tonia Santoro MD  [facility]    Subjective   Bereketathy is a 84 year old, presenting for the following health issues:  Chief Complaint   Patient presents with     ER F/U     HPI     ED/UC Followup:    Facility:  [facility]   Date of visit: 01/27/2023  Reason for visit: abdominal pain  Current Status: same    Found to be in a flutter at this visit, already connected with cardiology and will see them next week.     Has met with GI for her presumed GI etiology of symptoms. Instructed to switch from nexium to omeprazole BID x1 month, then decrease to 40 mg daily. Take famotidine at bedtime. Take carafate x2 weeks.     Stomach pains - feeling weak.   Tired.   Struggle for her to do anything.   Doesn't feel anything with the heart except smetimes att night when tryng to go to sleep.   Into throat.   Only on new meds for 3 days    Has gained some weight in the past 2 weeks  Leg swelling  Knee pain = goes away afer walking around for a while.   Feel slike right leg is more swollen.       Review of Systems   Constitutional, HEENT, cardiovascular, pulmonary, GI, , musculoskeletal, neuro, skin, endocrine and psych systems are negative, except as otherwise noted.      Objective    /70 (BP Location: Right arm, Patient Position: Sitting, Cuff Size: Adult Regular)   Pulse 94   Resp 16   Ht 1.626 m (5' 4\")   Wt 100.2 kg (221 lb)   LMP  (LMP Unknown)   SpO2 98%   BMI 37.93 kg/m    Body mass index is 37.93 kg/m .  Physical Exam  Constitutional:       Appearance: Normal appearance.   HENT:      Head: Normocephalic.   Eyes:      General: No scleral icterus.     Extraocular Movements: Extraocular movements intact.      Conjunctiva/sclera: Conjunctivae normal.   Cardiovascular:      Rate and Rhythm: Normal rate. Rhythm irregular.   Pulmonary:      Effort: Pulmonary effort is normal.      Breath sounds: Normal breath sounds. No wheezing, rhonchi or rales.   Musculoskeletal:         General: " Normal range of motion.      Cervical back: Normal range of motion.      Right lower leg: Edema (1+ pitting) present.      Left lower leg: Edema (1+ pitting) present.   Neurological:      General: No focal deficit present.      Mental Status: She is alert and oriented to person, place, and time.         Results from this visit  Results for orders placed or performed in visit on 02/10/23   BNP-N terminal pro     Status: Abnormal   Result Value Ref Range    N Terminal Pro BNP Outpatient 7,713 (H) 0 - 1,800 pg/mL   Vitamin B12     Status: Normal   Result Value Ref Range    Vitamin B12 436 232 - 1,245 pg/mL   Vitamin D deficiency screening     Status: Abnormal   Result Value Ref Range    Vitamin D, Total (25-Hydroxy) 12 (L) 20 - 75 ug/L    Narrative    Season, race, dietary intake, and treatment affect the concentration of 25-hydroxy-Vitamin D. Values may decrease during winter months and increase during summer months. Values 20-29 ug/L may indicate Vitamin D insufficiency and values <20 ug/L may indicate Vitamin D deficiency.    Vitamin D determination is routinely performed by an immunoassay specific for 25 hydroxyvitamin D3.  If an individual is on vitamin D2(ergocalciferol) supplementation, please specify 25 OH vitamin D2 and D3 level determination by LCMSMS test VITD23.     Hemoglobin A1c     Status: Abnormal   Result Value Ref Range    Hemoglobin A1C 7.9 (H) 0.0 - 5.6 %   Basic metabolic panel     Status: Abnormal   Result Value Ref Range    Sodium 137 136 - 145 mmol/L    Potassium 4.3 3.4 - 5.3 mmol/L    Chloride 102 98 - 107 mmol/L    Carbon Dioxide (CO2) 24 22 - 29 mmol/L    Anion Gap 11 7 - 15 mmol/L    Urea Nitrogen 22.0 8.0 - 23.0 mg/dL    Creatinine 0.93 0.51 - 0.95 mg/dL    Calcium 9.1 8.8 - 10.2 mg/dL    Glucose 145 (H) 70 - 99 mg/dL    GFR Estimate 60 (L) >60 mL/min/1.73m2   Ferritin     Status: Normal   Result Value Ref Range    Ferritin 158 11 - 328 ng/mL   CBC with Platelets and Reflex to Iron Studies      Status: Abnormal   Result Value Ref Range    WBC Count 7.8 4.0 - 11.0 10e3/uL    RBC Count 3.87 3.80 - 5.20 10e6/uL    Hemoglobin 10.9 (L) 11.7 - 15.7 g/dL    Hematocrit 34.0 (L) 35.0 - 47.0 %    MCV 88 78 - 100 fL    MCH 28.2 26.5 - 33.0 pg    MCHC 32.1 31.5 - 36.5 g/dL    RDW 16.7 (H) 10.0 - 15.0 %    Platelet Count 361 150 - 450 10e3/uL   Extra Green Top (Lithium Heparin) Tube     Status: None   Result Value Ref Range    Hold Specimen [facility]    Iron & Iron Binding Capacity     Status: Abnormal   Result Value Ref Range    Iron 52 37 - 145 ug/dL    Iron Binding Capacity 204 (L) 240 - 430 ug/dL    Iron Sat Index 25 15 - 46 %   CBC with Platelets and Reflex to Iron Studies     Status: Abnormal    Narrative    The following orders were created for panel order CBC with Platelets and Reflex to Iron Studies.  Procedure                               Abnormality         Status                     ---------                               -----------         ------                     CBC with Platelets and R...[[id number]]  Abnormal            Final result               Extra Green Top (Lithium...[[id number]]                      Final result                 Please view results for these tests on the individual orders.                    No

## 2024-10-20 NOTE — ED PROVIDER NOTE - CARE PROVIDER_API CALL
Darshan Silver  Neurology  [address] [place] [address]  Phone: [phone]  Fax: [phone]  Follow Up Time: 1-3 Days    Claudio Petersen  Orthopaedic Surgery  [address] [facility], [address]  [place] [address]  Phone: [phone]  Fax: [phone]  Follow Up Time: 1-3 Days    YOUR PCP,   Phone: (   )    -  Fax: (   )    -  Follow Up Time: 1-3 Days

## 2024-10-20 NOTE — ED PROVIDER NOTE - MUSCULOSKELETAL, MLM
Spine appears normal, range of motion is not limited, +paravertebral cervical spine tenderness with FROM, no stepoffs/ecchymosis noted,  no midline C/T/L spine tenderness, BUE and BLE NT with FROM

## 2024-10-20 NOTE — ED ADULT NURSE NOTE - CHPI ED NUR DURATION
Dr Garay called stating the the diagnostic mammogram was done on right breast and showed some benign calcifications on on the right breast that he is going to set up a biopsy of the right breasts. Informed dr escobedo of this.  
yesterday

## 2024-10-23 ENCOUNTER — EMERGENCY (EMERGENCY)
Facility: HOSPITAL | Age: 19
LOS: 1 days | Discharge: HOME | End: 2024-10-23
Attending: STUDENT IN AN ORGANIZED HEALTH CARE EDUCATION/TRAINING PROGRAM | Admitting: STUDENT IN AN ORGANIZED HEALTH CARE EDUCATION/TRAINING PROGRAM
Payer: MEDICAID

## 2024-10-23 VITALS
SYSTOLIC BLOOD PRESSURE: 129 MMHG | HEART RATE: 105 BPM | OXYGEN SATURATION: 100 % | TEMPERATURE: 99 F | DIASTOLIC BLOOD PRESSURE: 86 MMHG | RESPIRATION RATE: 17 BRPM

## 2024-10-23 VITALS
SYSTOLIC BLOOD PRESSURE: 134 MMHG | RESPIRATION RATE: 16 BRPM | HEART RATE: 96 BPM | HEIGHT: 63 IN | OXYGEN SATURATION: 100 % | TEMPERATURE: 98 F | DIASTOLIC BLOOD PRESSURE: 86 MMHG

## 2024-10-23 DIAGNOSIS — Z78.9 OTHER SPECIFIED HEALTH STATUS: Chronic | ICD-10-CM

## 2024-10-23 PROCEDURE — 99282 EMERGENCY DEPT VISIT SF MDM: CPT

## 2024-10-23 PROCEDURE — 99283 EMERGENCY DEPT VISIT LOW MDM: CPT

## 2024-10-23 RX ORDER — ACETAMINOPHEN 500 MG/5ML
975 LIQUID (ML) ORAL ONCE
Refills: 0 | Status: COMPLETED | OUTPATIENT
Start: 2024-10-23 | End: 2024-10-23

## 2024-10-23 RX ADMIN — Medication 975 MILLIGRAM(S): at 14:36

## 2024-10-29 ENCOUNTER — EMERGENCY (EMERGENCY)
Facility: HOSPITAL | Age: 19
LOS: 1 days | Discharge: HOME | End: 2024-10-29
Attending: EMERGENCY MEDICINE | Admitting: EMERGENCY MEDICINE
Payer: MEDICAID

## 2024-10-29 VITALS
SYSTOLIC BLOOD PRESSURE: 123 MMHG | TEMPERATURE: 99 F | RESPIRATION RATE: 16 BRPM | DIASTOLIC BLOOD PRESSURE: 80 MMHG | HEART RATE: 84 BPM | OXYGEN SATURATION: 100 %

## 2024-10-29 VITALS
RESPIRATION RATE: 16 BRPM | WEIGHT: 135.14 LBS | SYSTOLIC BLOOD PRESSURE: 121 MMHG | DIASTOLIC BLOOD PRESSURE: 64 MMHG | OXYGEN SATURATION: 100 % | HEART RATE: 86 BPM | TEMPERATURE: 98 F | HEIGHT: 63 IN

## 2024-10-29 VITALS
DIASTOLIC BLOOD PRESSURE: 70 MMHG | HEIGHT: 63 IN | RESPIRATION RATE: 15 BRPM | TEMPERATURE: 98 F | HEART RATE: 99 BPM | WEIGHT: 126.1 LBS | OXYGEN SATURATION: 98 % | SYSTOLIC BLOOD PRESSURE: 105 MMHG

## 2024-10-29 VITALS
DIASTOLIC BLOOD PRESSURE: 75 MMHG | OXYGEN SATURATION: 97 % | TEMPERATURE: 98 F | SYSTOLIC BLOOD PRESSURE: 120 MMHG | HEART RATE: 76 BPM | RESPIRATION RATE: 16 BRPM

## 2024-10-29 DIAGNOSIS — Z78.9 OTHER SPECIFIED HEALTH STATUS: Chronic | ICD-10-CM

## 2024-10-29 LAB
ALBUMIN SERPL ELPH-MCNC: 3.8 G/DL — SIGNIFICANT CHANGE UP (ref 3.3–5)
ALP SERPL-CCNC: 63 U/L — SIGNIFICANT CHANGE UP (ref 30–120)
ALT FLD-CCNC: 22 U/L — SIGNIFICANT CHANGE UP (ref 10–60)
ANION GAP SERPL CALC-SCNC: 8 MMOL/L — SIGNIFICANT CHANGE UP (ref 5–17)
APPEARANCE UR: CLEAR — SIGNIFICANT CHANGE UP
AST SERPL-CCNC: 15 U/L — SIGNIFICANT CHANGE UP (ref 10–40)
BASOPHILS # BLD AUTO: 0.06 K/UL — SIGNIFICANT CHANGE UP (ref 0–0.2)
BASOPHILS NFR BLD AUTO: 0.6 % — SIGNIFICANT CHANGE UP (ref 0–2)
BILIRUB SERPL-MCNC: 0.3 MG/DL — SIGNIFICANT CHANGE UP (ref 0.2–1.2)
BILIRUB UR-MCNC: NEGATIVE — SIGNIFICANT CHANGE UP
BUN SERPL-MCNC: 13 MG/DL — SIGNIFICANT CHANGE UP (ref 7–23)
CALCIUM SERPL-MCNC: 8.5 MG/DL — SIGNIFICANT CHANGE UP (ref 8.4–10.5)
CHLORIDE SERPL-SCNC: 106 MMOL/L — SIGNIFICANT CHANGE UP (ref 96–108)
CO2 SERPL-SCNC: 27 MMOL/L — SIGNIFICANT CHANGE UP (ref 22–31)
COLOR SPEC: YELLOW — SIGNIFICANT CHANGE UP
CREAT SERPL-MCNC: 0.66 MG/DL — SIGNIFICANT CHANGE UP (ref 0.5–1.3)
DIFF PNL FLD: NEGATIVE — SIGNIFICANT CHANGE UP
EGFR: 128 ML/MIN/1.73M2 — SIGNIFICANT CHANGE UP
EOSINOPHIL # BLD AUTO: 0.05 K/UL — SIGNIFICANT CHANGE UP (ref 0–0.5)
EOSINOPHIL NFR BLD AUTO: 0.5 % — SIGNIFICANT CHANGE UP (ref 0–6)
GLUCOSE SERPL-MCNC: 85 MG/DL — SIGNIFICANT CHANGE UP (ref 70–99)
GLUCOSE UR QL: NEGATIVE MG/DL — SIGNIFICANT CHANGE UP
HCG SERPL-ACNC: <1 MIU/ML — SIGNIFICANT CHANGE UP
HCT VFR BLD CALC: 38.5 % — SIGNIFICANT CHANGE UP (ref 34.5–45)
HGB BLD-MCNC: 12.6 G/DL — SIGNIFICANT CHANGE UP (ref 11.5–15.5)
IMM GRANULOCYTES NFR BLD AUTO: 0.2 % — SIGNIFICANT CHANGE UP (ref 0–0.9)
KETONES UR-MCNC: NEGATIVE MG/DL — SIGNIFICANT CHANGE UP
LEUKOCYTE ESTERASE UR-ACNC: NEGATIVE — SIGNIFICANT CHANGE UP
LIDOCAIN IGE QN: 34 U/L — SIGNIFICANT CHANGE UP (ref 16–77)
LYMPHOCYTES # BLD AUTO: 1.65 K/UL — SIGNIFICANT CHANGE UP (ref 1–3.3)
LYMPHOCYTES # BLD AUTO: 16.9 % — SIGNIFICANT CHANGE UP (ref 13–44)
MCHC RBC-ENTMCNC: 28.3 PG — SIGNIFICANT CHANGE UP (ref 27–34)
MCHC RBC-ENTMCNC: 32.7 G/DL — SIGNIFICANT CHANGE UP (ref 32–36)
MCV RBC AUTO: 86.5 FL — SIGNIFICANT CHANGE UP (ref 80–100)
MONOCYTES # BLD AUTO: 0.56 K/UL — SIGNIFICANT CHANGE UP (ref 0–0.9)
MONOCYTES NFR BLD AUTO: 5.7 % — SIGNIFICANT CHANGE UP (ref 2–14)
NEUTROPHILS # BLD AUTO: 7.44 K/UL — HIGH (ref 1.8–7.4)
NEUTROPHILS NFR BLD AUTO: 76.1 % — SIGNIFICANT CHANGE UP (ref 43–77)
NITRITE UR-MCNC: NEGATIVE — SIGNIFICANT CHANGE UP
NRBC # BLD: 0 /100 WBCS — SIGNIFICANT CHANGE UP (ref 0–0)
PH UR: 8 — SIGNIFICANT CHANGE UP (ref 5–8)
PLATELET # BLD AUTO: 277 K/UL — SIGNIFICANT CHANGE UP (ref 150–400)
POTASSIUM SERPL-MCNC: 3.4 MMOL/L — LOW (ref 3.5–5.3)
POTASSIUM SERPL-SCNC: 3.4 MMOL/L — LOW (ref 3.5–5.3)
PROT SERPL-MCNC: 6.6 G/DL — SIGNIFICANT CHANGE UP (ref 6–8.3)
PROT UR-MCNC: NEGATIVE MG/DL — SIGNIFICANT CHANGE UP
RBC # BLD: 4.45 M/UL — SIGNIFICANT CHANGE UP (ref 3.8–5.2)
RBC # FLD: 13.2 % — SIGNIFICANT CHANGE UP (ref 10.3–14.5)
SODIUM SERPL-SCNC: 141 MMOL/L — SIGNIFICANT CHANGE UP (ref 135–145)
SP GR SPEC: 1.02 — SIGNIFICANT CHANGE UP (ref 1–1.03)
UROBILINOGEN FLD QL: 0.2 MG/DL — SIGNIFICANT CHANGE UP (ref 0.2–1)
WBC # BLD: 9.78 K/UL — SIGNIFICANT CHANGE UP (ref 3.8–10.5)
WBC # FLD AUTO: 9.78 K/UL — SIGNIFICANT CHANGE UP (ref 3.8–10.5)

## 2024-10-29 PROCEDURE — 81003 URINALYSIS AUTO W/O SCOPE: CPT

## 2024-10-29 PROCEDURE — 99282 EMERGENCY DEPT VISIT SF MDM: CPT

## 2024-10-29 PROCEDURE — 99284 EMERGENCY DEPT VISIT MOD MDM: CPT

## 2024-10-29 PROCEDURE — 74177 CT ABD & PELVIS W/CONTRAST: CPT | Mod: MC

## 2024-10-29 PROCEDURE — 85025 COMPLETE CBC W/AUTO DIFF WBC: CPT

## 2024-10-29 PROCEDURE — 83690 ASSAY OF LIPASE: CPT

## 2024-10-29 PROCEDURE — 80053 COMPREHEN METABOLIC PANEL: CPT

## 2024-10-29 PROCEDURE — 36415 COLL VENOUS BLD VENIPUNCTURE: CPT

## 2024-10-29 PROCEDURE — 84702 CHORIONIC GONADOTROPIN TEST: CPT

## 2024-10-29 PROCEDURE — 74177 CT ABD & PELVIS W/CONTRAST: CPT | Mod: 26,MC

## 2024-10-29 PROCEDURE — 99285 EMERGENCY DEPT VISIT HI MDM: CPT

## 2024-10-29 RX ORDER — SODIUM CHLORIDE 0.9 % (FLUSH) 0.9 %
1000 SYRINGE (ML) INJECTION ONCE
Refills: 0 | Status: COMPLETED | OUTPATIENT
Start: 2024-10-29 | End: 2024-10-29

## 2024-10-29 RX ORDER — IOHEXOL 350 MG/ML
30 INJECTION, SOLUTION INTRAVENOUS ONCE
Refills: 0 | Status: COMPLETED | OUTPATIENT
Start: 2024-10-29 | End: 2024-10-29

## 2024-10-29 RX ORDER — MECLIZINE HYDROCLORIDE 25 MG/1
25 TABLET ORAL ONCE
Refills: 0 | Status: COMPLETED | OUTPATIENT
Start: 2024-10-29 | End: 2024-10-29

## 2024-10-29 RX ORDER — MECLIZINE HYDROCLORIDE 25 MG/1
1 TABLET ORAL
Qty: 15 | Refills: 0
Start: 2024-10-29

## 2024-10-29 RX ADMIN — IOHEXOL 30 MILLILITER(S): 350 INJECTION, SOLUTION INTRAVENOUS at 14:13

## 2024-10-29 RX ADMIN — Medication 1000 MILLILITER(S): at 14:13

## 2024-10-29 RX ADMIN — MECLIZINE HYDROCLORIDE 25 MILLIGRAM(S): 25 TABLET ORAL at 22:05

## 2024-10-29 NOTE — ED PROVIDER NOTE - NSFOLLOWUPINSTRUCTIONS_ED_ALL_ED_FT
1. Follow-up with your Primary Medical Doctor or referred doctor. Call tomorrow for prompt follow-up.  2. Return to Emergency room for any worsening or persistent pain, weakness, fever, vomiting, diarrhea, unable to eat / drink, weak or dizzy, or any other concerning symptoms.  3. See attached instruction sheets for additional information, including information regarding signs and symptoms to look out for, reasons to seek immediate care and other important instructions.  4. Follow-up with gastroenterology, call tomorrow for prompt follow-up

## 2024-10-29 NOTE — ED ADULT TRIAGE NOTE - INTERNATIONAL TRAVEL
[FreeTextEntry1] : I, Oliverio Viramontes, acted solely as a scribe for Dr. Ted Chen on this date 07/16/2021  .\par  \par All medical record entries made by the Scribe were at my, Dr. Ted Chen, direction and personally dictated by me on 07/16/2021 . I have reviewed the chart and agree that the record accurately reflects my personal performance of the history, physical exam, assessment and plan. I have also personally directed, reviewed, and agreed with the chart.  No

## 2024-10-29 NOTE — ED PROVIDER NOTE - CARE PROVIDER_API CALL
Chasity Art  Follow Up Time:     Yo Nunez  Gastroenterology  [address] [place] [id number]  Phone: [phone]  Fax: [phone]  Follow Up Time:

## 2024-10-29 NOTE — ED PROVIDER NOTE - PATIENT PORTAL LINK FT
You can access the FollowMyHealth Patient Portal offered by [facility] by registering at the following website: [email or web address]. By joining [employer]’s FollowMyHealth portal, you will also be able to view your health information using other applications (apps) compatible with our system.

## 2024-10-29 NOTE — ED PROVIDER NOTE - CLINICAL SUMMARY MEDICAL DECISION MAKING FREE TEXT BOX
Acute abdominal pain with periumbilical tenderness, some abnormal stool.  Will check labs, CT, IV fluids, reeval

## 2024-10-29 NOTE — ED ADULT NURSE REASSESSMENT NOTE - NS ED NURSE REASSESS COMMENT FT1
patient sent for CT at this time.
upon entering room for medication administration patient drinking soda and eating quesadilla, educated on NPO status while awaiting CT scan.

## 2024-10-29 NOTE — ED ADULT NURSE NOTE - OBJECTIVE STATEMENT
patient presents to ED states she was drinking alcohol last night and today has epigastric pain and "fish smelling stool." denies n/v/d/fever/chills/gu symptoms.

## 2024-10-29 NOTE — ED ADULT NURSE NOTE - NSFALLUNIVINTERV_ED_ALL_ED
Bed/Stretcher in lowest position, wheels locked, appropriate side rails in place/Call bell, personal items and telephone in reach/Instruct patient to call for assistance before getting out of bed/chair/stretcher/Non-slip footwear applied when patient is off stretcher/[place] to call system/Physically safe environment - no spills, clutter or unnecessary equipment/Purposeful proactive rounding/Room/bathroom lighting operational, light cord in reach

## 2024-10-29 NOTE — ED PROVIDER NOTE - CARE PROVIDERS DIRECT ADDRESSES
[email or web address]

## 2024-10-29 NOTE — ED PROVIDER NOTE - CLINICAL SUMMARY MEDICAL DECISION MAKING FREE TEXT BOX
20 yo F with hx of vertigo takes meclizine for it presents for an episode of veritgo at 6p but had run out of her meclizine. Pt had normal labs earlier in the day when she was seen for abd pain. Will give dose of meclizine.

## 2024-10-29 NOTE — ED ADULT NURSE NOTE - SKIN TEMPERATURE
Chief Complaint   Patient presents with     STD     pt is here for a STD check      Violette Pyle CMA at 1:02 PM on 7/25/2019  
warm

## 2024-10-29 NOTE — ED PROVIDER NOTE - PATIENT PORTAL LINK FT
You can access the FollowMyHealth Patient Portal offered by [facility] by registering at the following website: [email or web address]. By joining [employer]’s FollowMyHealth portal, you will also be able to view your health information using other applications (apps) compatible with our system.

## 2024-10-29 NOTE — ED ADULT NURSE NOTE - OBJECTIVE STATEMENT
Pt presents to the ER complaining of having an episode of vertigo. Pt stated that she is out of her meclazine.

## 2024-10-29 NOTE — ED ADULT NURSE NOTE - EAR DISTURBANCES
Reason for Call:  Other appointment    Detailed comments: pt in large amounts of pain and needs something for pain and would like to see her sooner that her appt on 3/3/22.    Is waiting for injection but is in so much pain and she needs something and [facility] will not give her anything.   She states they made her feel like a drug seeker and she is not, she has tried so many things and just recently finished steroid pack and now pain.         Phone Number Patient can be reached at: Home number on file [phone] (home)    Best Time: anytime    Can we leave a detailed message on this number? YES    Call taken on 2/24/2022 at 8:30 AM by Fransisco Reilly       normal

## 2024-10-29 NOTE — ED PROVIDER NOTE - OBJECTIVE STATEMENT
19-year-old female with a history of migraines, ADHD, autism, anxiety presents with Abdominal Pain Today.  The Patient States That She Has Some Fishy Smelling Stool As Well.  She Says There Was Some Slight Blood in the Stool Today As Well.  No Acute Nausea or Vomiting.  No Cough/URI.  No Chest Pain or Shortness of Breath.  Patient Has Chronic Headaches, for Which She Takes Advil/Motrin.  No aggravating or alleviating factors otherwise noted.  No other acute complaints.

## 2024-10-29 NOTE — ED ADULT TRIAGE NOTE - HEIGHT IN INCHES
VAGAL NERVE STIMULATOR    Last Clinic Visit: In person 1/30/2020, telephone visit 7/30/2020    Implant Date: 1/13/2015  Implant Model #: 105  Implant Serial #: [id number]    Output current: 2.0 --> 0 (mA)    Signal frequency: 30 (hz)    Pulse width: 500 (milliseconds)    Signal ON Time: 30 (seconds)    Signal OFF Time: 3 (minutes)    Duty Cycle: 16%    Magnet Current: 2.25 --> 0 (mA)    Magnet Pulse Width: 500 (milliseconds)     Magnet ON Time: 60 (seconds)    SYSTEM MODE DIAGNOSTICS    Output Status: OK  Lead Impedance: OK  Impedance Value: 1858 (ohms)  EOS: No with Battery Indicator green at 25-50 %    Vagal Nerve Stimulator Side Effects: Patient reports no hoarseness, coughing, shortness of breath, or pain at this time.     Patient's VNS was turned off at 0859 for MRI Brain today. MRI department informed.    [facility]  [phone]    After 4:30 pm during weekdays, and over the weekends and holidays, please page the Epileptologist on call.     3

## 2024-10-29 NOTE — ED PROVIDER NOTE - OBJECTIVE STATEMENT
20 yo F presenting with c/o an episode of vertigo at 6pm. Pt states she does not have her meclizine 20 yo F presenting with c/o an episode of vertigo at 6pm. Pt states she does not have her meclizine, she ran out. Pt was seen earlier in the day for abd pain and negative workup with labs and CT. Denies any there symptoms.

## 2024-10-29 NOTE — ED ADULT TRIAGE NOTE - BP NONINVASIVE DIASTOLIC (MM HG)
Goal Outcome Evaluation:      Patient being discharged home, complained of pain and medicated per orders, vitals stable.                   64

## 2024-10-29 NOTE — ED PROVIDER NOTE - NSICDXPASTMEDICALHX_GEN_ALL_CORE_FT
PAST MEDICAL HISTORY:  Anxiety     Attention deficit hyperactivity disorder     Autism spectrum disorder     Malnutrition     Migraine      10

## 2024-10-29 NOTE — ED ADULT TRIAGE NOTE - CHIEF COMPLAINT QUOTE
I have fishy smell from my stool. stool is soft but not dark color, patient has been taking advil for migraine for every day. last dose was two days ago and patient has abdominal pain a little bit.

## 2024-10-29 NOTE — ED PROVIDER NOTE - PROGRESS NOTE DETAILS
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 Patient doing well, no acute complaints at this time.  Discussed with patient regarding findings on CT, labs.  Patient also asked me to do a neurologic exam, which she is neurologically intact, NIHSS = 0.  The patient will follow-up with a primary care doctor, return with any acute changes or concerns.

## 2024-10-29 NOTE — ED ADULT TRIAGE NOTE - TEMPERATURE IN CELSIUS (DEGREES C)
-PT/OT; history of fall with non displaced oblique right tibia fracture at metaphysis into diaphysis. Was wearing brace.  -Severely debilited; needs placement. SW consulted and following     36.7

## 2024-10-30 ENCOUNTER — EMERGENCY (EMERGENCY)
Facility: HOSPITAL | Age: 19
LOS: 1 days | Discharge: HOME | End: 2024-10-30
Attending: EMERGENCY MEDICINE | Admitting: EMERGENCY MEDICINE
Payer: MEDICAID

## 2024-10-30 VITALS
OXYGEN SATURATION: 100 % | HEIGHT: 63 IN | RESPIRATION RATE: 19 BRPM | DIASTOLIC BLOOD PRESSURE: 67 MMHG | SYSTOLIC BLOOD PRESSURE: 109 MMHG | WEIGHT: 132.72 LBS | HEART RATE: 91 BPM | TEMPERATURE: 99 F

## 2024-10-30 DIAGNOSIS — Z78.9 OTHER SPECIFIED HEALTH STATUS: Chronic | ICD-10-CM

## 2024-10-30 PROCEDURE — 99283 EMERGENCY DEPT VISIT LOW MDM: CPT | Mod: 25

## 2024-10-30 PROCEDURE — 99282 EMERGENCY DEPT VISIT SF MDM: CPT

## 2024-11-01 ENCOUNTER — EMERGENCY (EMERGENCY)
Facility: HOSPITAL | Age: 19
LOS: 1 days | Discharge: HOME | End: 2024-11-01
Attending: STUDENT IN AN ORGANIZED HEALTH CARE EDUCATION/TRAINING PROGRAM
Payer: MEDICAID

## 2024-11-01 VITALS
TEMPERATURE: 98 F | DIASTOLIC BLOOD PRESSURE: 80 MMHG | SYSTOLIC BLOOD PRESSURE: 126 MMHG | HEART RATE: 95 BPM | OXYGEN SATURATION: 98 % | RESPIRATION RATE: 18 BRPM

## 2024-11-01 VITALS
HEIGHT: 63 IN | RESPIRATION RATE: 19 BRPM | SYSTOLIC BLOOD PRESSURE: 121 MMHG | DIASTOLIC BLOOD PRESSURE: 82 MMHG | HEART RATE: 98 BPM | TEMPERATURE: 98 F | OXYGEN SATURATION: 97 % | WEIGHT: 130.07 LBS

## 2024-11-01 DIAGNOSIS — Z78.9 OTHER SPECIFIED HEALTH STATUS: Chronic | ICD-10-CM

## 2024-11-01 PROCEDURE — 99284 EMERGENCY DEPT VISIT MOD MDM: CPT | Mod: 25

## 2024-11-01 PROCEDURE — 99283 EMERGENCY DEPT VISIT LOW MDM: CPT

## 2024-11-01 RX ORDER — LIDOCAINE HYDROCHLORIDE 40 MG/ML
1 SOLUTION TOPICAL ONCE
Refills: 0 | Status: COMPLETED | OUTPATIENT
Start: 2024-11-01 | End: 2024-11-01

## 2024-11-01 RX ORDER — MECLIZINE HCL 25 MG
25 TABLET ORAL ONCE
Refills: 0 | Status: COMPLETED | OUTPATIENT
Start: 2024-11-01 | End: 2024-11-01

## 2024-11-01 RX ORDER — ACETAMINOPHEN 500 MG
650 TABLET ORAL ONCE
Refills: 0 | Status: COMPLETED | OUTPATIENT
Start: 2024-11-01 | End: 2024-11-01

## 2024-11-01 RX ADMIN — LIDOCAINE HYDROCHLORIDE 1 PATCH: 40 SOLUTION TOPICAL at 04:18

## 2024-11-01 RX ADMIN — Medication 25 MILLIGRAM(S): at 04:17

## 2024-11-01 RX ADMIN — Medication 650 MILLIGRAM(S): at 04:17

## 2024-11-01 NOTE — ED PROVIDER NOTE - OBJECTIVE STATEMENT
18 yo F w/ PMHx of vertigo and migraines presents for worsening headaches and a few minute long episode of hallucinations.  She took Advil today morning and meclizine/Nurtec (~7 PM) for persistent posterior headaches.  She had a few minute long episode of hallucinations which she saw a person which is since resolved.  She is concerned that she may have a brain aneurysm after reading online about her symptoms.  On review of systems, she endorses slight anxiety but attributes it to her concern about a brain aneurysm.  She denies any SI/HI, CP, SOB, fall/trauma.    Vital signs unremarkable.  Physical exam is unremarkable with no focal neurological deficits.  Concern for exacerbation of her chronic migraine.  Plan for trial with medications including meclizine, lidocaine patch, and acetaminophen.  Will reassess after medications to see if any additional medication/evaluation is necessary.

## 2024-11-01 NOTE — ED ADULT NURSE NOTE - OBJECTIVE STATEMENT
19y F Muna x4 came in for a day of headaches with hallucinations. Patient reports that she has a history of vertigo and migraines, patient reports this morning she developed a migraine that did not disappear after taking medication. Patient tried other prescribed medications at 1900 but had no relief of symptoms. Patient reports she began having hallucinations following headaches that included seeing multiple shadows. Patient reports hx. of anxiety and that she was researching her symptoms and became extremely anxious that she was having a brain aneurysm. Denies SI/HI. Denies fever, chills, blurry vison, n/v/d, dysuria, weakness, numbness, tingling, SOB, and chest pain. Bed is in lowest position.

## 2024-11-01 NOTE — ED PROVIDER NOTE - NS_EDPROVIDERDISPOUSERTYPE_ED_A_ED
Admission diagnosis: Intrauterine pregnancy at term gestation; spontaneous onset of labor    Discharge diagnosis: Status post  viable female infant    Hospital course: Patient was admitted for spontaneous onset of labor. Her prenatal course was unremarkable. Upon admission she was noted to be in advanced labor with dilatation at 8 cm.  Fetal maternal status were normal.  She precipitously delivered a viable female infant.  Patient's postpartum course was uneventful.  On the first postpartum day the patient was requesting discharge.  Examination on discharge:Blood pressure (!) 87/50, pulse 68, temperature 96.6 °F (35.9 °C), temperature source Temporal, resp. rate 16, height 4' 5\" (1.346 m), weight 54.5 kg, last menstrual period 2018, SpO2 98 %, not currently breastfeeding.  Abdominal exam: The uterus is firm at the umbilicus  Extremities: Within normal limits    Discharge medications: Prenatal vitamins    Disposition: Usual postpartum instructions were given.   Attending Attestation (For Attendings USE Only)...

## 2024-11-01 NOTE — ED PROVIDER NOTE - PATIENT PORTAL LINK FT
You can access the FollowMyHealth Patient Portal offered by [facility] by registering at the following website: [email or web address]. By joining [employer]’s FollowMyHealth portal, you will also be able to view your health information using other applications (apps) compatible with our system.

## 2024-11-01 NOTE — ED ADULT NURSE NOTE - MODE OF DISCHARGE
POST-OP DIAGNOSIS:  Gangrene of toe of left foot 06-Jun-2023 22:29:09  Ebenezer Hernández   Ambulatory

## 2024-11-01 NOTE — ED ADULT TRIAGE NOTE - CHIEF COMPLAINT QUOTE
"seeing shadows" since today. [facility] anxiey. Denies SI/HI, denies ETOH/drug use

## 2024-11-01 NOTE — ED PROVIDER NOTE - NSFOLLOWUPINSTRUCTIONS_ED_ALL_ED_FT
1. TAKE ALL MEDICATIONS AS DIRECTED.    2. FOR PAIN OR FEVER YOU CAN TAKE IBUPROFEN (MOTRIN, ADVIL) 600mg every 6 hours OR ACETAMINOPHEN (TYLENOL) 975mg every 6 hours AS NEEDED, AS DIRECTED ON PACKAGING.  3. FOLLOW UP WITH YOUR PRIMARY DOCTOR WITHIN 5 DAYS AS DIRECTED.  4. IF YOU HAD LABS OR IMAGING DONE, YOU WERE GIVEN COPIES OF ALL LABS AND/OR IMAGING RESULTS FROM YOUR ER VISIT--PLEASE TAKE THEM WITH YOU TO YOUR FOLLOW UP APPOINTMENTS.   To obtain a copy of your medical records or disc, please contact medical records during the hours of operation (Monday - Friday 8am - 7pm; Saturday 8am - 4pm) at Phone: [phone]   5. RETURN TO THE ER FOR ANY WORSENING SYMPTOMS OR CONCERNS.    Dizziness    Dizziness can manifest as a feeling of unsteadiness or light-headedness. You may feel like you are about to faint. This condition can be caused by a number of things, including medicines, dehydration, or illness. Drink enough fluid to keep your urine clear or pale yellow. Do not drink alcohol and limit your caffeine intake. Avoid quick or sudden movements.  Rise slowly from chairs and steady yourself until you feel okay. In the morning, first sit up on the side of the bed.    SEEK IMMEDIATE MEDICAL CARE IF YOU HAVE ANY OF THE FOLLOWING SYMPTOMS: vomiting, changes in your vision or speech, weakness in your arms or legs, trouble speaking or swallowing, chest pain, abdominal pain, shortness of breath, sweating, bleeding, headache, neck pain, or fever.    Please see a psychiatrist at [facility] for short-term psychiatric services and making a connection to long-term care, the hours are m-f 9am-3pm and the phone # is [phone]. [facility] is located on the first floor of the [facility], within the campus of [facility]. The main entrance to our building is at the corner of [address] [place] [address] and [address] [place] [address] in [place], [place]. You can also access our campus through the hospital entrance at [phone] [place] [address]

## 2024-11-01 NOTE — ED PROVIDER NOTE - ATTENDING CONTRIBUTION TO CARE
I, Abdias Anthony, performed a history and physical exam of the patient and discussed their management with the resident provider. I reviewed the resident provider's note and agree with the documented findings and plan of care. I was present and available for all procedures.    Patient presenting with episode of reported hallucination patient reported possibly seeing flashes some finger walking across otherwise has not had any similar hallucinations denies SI HI AH VH well-appearing reassuring vital signs and exam no acute psychiatric concerns at this time will treat for dizziness discharge follow-up PMD return precautions discussed patient agreed with plan unlikely ACS PE pneumothorax dissection AAA pneumonia unlikely ich cva aneurysm     Gen: Well appearing and in NAD  Head: normal appearing atraumatic   Neck: trachea midline  Resp:  No respiratory distress  Abd; soft NT ND  Ext: no visible deformities  Neuro:  Alert and oriented, appears non focal  Skin:  Warm and dry as visualized  Psych:  Normal affect and mood    The patient presents with an acute onset headache. Patient has no past history of headaches. There is not a history of anticoagulation, trauma, pregnancy, cancer or immunocompromised state.  Mental status was normal, no neurological deficits were noted. Will give analgesia reassess for discharge. Follow up with return precautions.

## 2024-11-01 NOTE — ED ADULT NURSE NOTE - CHIEF COMPLAINT QUOTE
"seeing shadows" since today. [facility] anxiey. Denies SI/HI, denies ETOH/drug use

## 2024-11-03 ENCOUNTER — EMERGENCY (EMERGENCY)
Facility: HOSPITAL | Age: 19
LOS: 1 days | Discharge: HOME | End: 2024-11-03
Attending: EMERGENCY MEDICINE | Admitting: EMERGENCY MEDICINE
Payer: MEDICAID

## 2024-11-03 VITALS
RESPIRATION RATE: 15 BRPM | HEIGHT: 63 IN | OXYGEN SATURATION: 100 % | TEMPERATURE: 99 F | WEIGHT: 125 LBS | DIASTOLIC BLOOD PRESSURE: 76 MMHG | HEART RATE: 88 BPM | SYSTOLIC BLOOD PRESSURE: 133 MMHG

## 2024-11-03 VITALS
TEMPERATURE: 99 F | SYSTOLIC BLOOD PRESSURE: 134 MMHG | OXYGEN SATURATION: 100 % | HEART RATE: 80 BPM | DIASTOLIC BLOOD PRESSURE: 84 MMHG | RESPIRATION RATE: 16 BRPM

## 2024-11-03 DIAGNOSIS — Z78.9 OTHER SPECIFIED HEALTH STATUS: Chronic | ICD-10-CM

## 2024-11-03 LAB
ALBUMIN SERPL ELPH-MCNC: 4.2 G/DL — SIGNIFICANT CHANGE UP (ref 3.3–5)
ALP SERPL-CCNC: 62 U/L — SIGNIFICANT CHANGE UP (ref 30–120)
ALT FLD-CCNC: 18 U/L — SIGNIFICANT CHANGE UP (ref 10–60)
ANION GAP SERPL CALC-SCNC: 10 MMOL/L — SIGNIFICANT CHANGE UP (ref 5–17)
AST SERPL-CCNC: 34 U/L — SIGNIFICANT CHANGE UP (ref 10–40)
BASOPHILS # BLD AUTO: 0.07 K/UL — SIGNIFICANT CHANGE UP (ref 0–0.2)
BASOPHILS NFR BLD AUTO: 0.7 % — SIGNIFICANT CHANGE UP (ref 0–2)
BILIRUB SERPL-MCNC: 0.6 MG/DL — SIGNIFICANT CHANGE UP (ref 0.2–1.2)
BUN SERPL-MCNC: 10 MG/DL — SIGNIFICANT CHANGE UP (ref 7–23)
CALCIUM SERPL-MCNC: 9.6 MG/DL — SIGNIFICANT CHANGE UP (ref 8.4–10.5)
CHLORIDE SERPL-SCNC: 106 MMOL/L — SIGNIFICANT CHANGE UP (ref 96–108)
CO2 SERPL-SCNC: 25 MMOL/L — SIGNIFICANT CHANGE UP (ref 22–31)
CREAT SERPL-MCNC: 0.64 MG/DL — SIGNIFICANT CHANGE UP (ref 0.5–1.3)
EGFR: 129 ML/MIN/1.73M2 — SIGNIFICANT CHANGE UP
EOSINOPHIL # BLD AUTO: 0.08 K/UL — SIGNIFICANT CHANGE UP (ref 0–0.5)
EOSINOPHIL NFR BLD AUTO: 0.8 % — SIGNIFICANT CHANGE UP (ref 0–6)
GLUCOSE SERPL-MCNC: 75 MG/DL — SIGNIFICANT CHANGE UP (ref 70–99)
HCG SERPL-ACNC: <1 MIU/ML — SIGNIFICANT CHANGE UP
HCT VFR BLD CALC: 38.5 % — SIGNIFICANT CHANGE UP (ref 34.5–45)
HGB BLD-MCNC: 12.3 G/DL — SIGNIFICANT CHANGE UP (ref 11.5–15.5)
IMM GRANULOCYTES NFR BLD AUTO: 0.1 % — SIGNIFICANT CHANGE UP (ref 0–0.9)
LYMPHOCYTES # BLD AUTO: 2.16 K/UL — SIGNIFICANT CHANGE UP (ref 1–3.3)
LYMPHOCYTES # BLD AUTO: 22.9 % — SIGNIFICANT CHANGE UP (ref 13–44)
MCHC RBC-ENTMCNC: 27.8 PG — SIGNIFICANT CHANGE UP (ref 27–34)
MCHC RBC-ENTMCNC: 31.9 G/DL — LOW (ref 32–36)
MCV RBC AUTO: 86.9 FL — SIGNIFICANT CHANGE UP (ref 80–100)
MONOCYTES # BLD AUTO: 0.53 K/UL — SIGNIFICANT CHANGE UP (ref 0–0.9)
MONOCYTES NFR BLD AUTO: 5.6 % — SIGNIFICANT CHANGE UP (ref 2–14)
NEUTROPHILS # BLD AUTO: 6.59 K/UL — SIGNIFICANT CHANGE UP (ref 1.8–7.4)
NEUTROPHILS NFR BLD AUTO: 69.9 % — SIGNIFICANT CHANGE UP (ref 43–77)
NRBC # BLD: 0 /100 WBCS — SIGNIFICANT CHANGE UP (ref 0–0)
PLATELET # BLD AUTO: 297 K/UL — SIGNIFICANT CHANGE UP (ref 150–400)
POTASSIUM SERPL-MCNC: 4.6 MMOL/L — SIGNIFICANT CHANGE UP (ref 3.5–5.3)
POTASSIUM SERPL-SCNC: 4.6 MMOL/L — SIGNIFICANT CHANGE UP (ref 3.5–5.3)
PROT SERPL-MCNC: 7.5 G/DL — SIGNIFICANT CHANGE UP (ref 6–8.3)
RBC # BLD: 4.43 M/UL — SIGNIFICANT CHANGE UP (ref 3.8–5.2)
RBC # FLD: 13.1 % — SIGNIFICANT CHANGE UP (ref 10.3–14.5)
SODIUM SERPL-SCNC: 141 MMOL/L — SIGNIFICANT CHANGE UP (ref 135–145)
WBC # BLD: 9.44 K/UL — SIGNIFICANT CHANGE UP (ref 3.8–10.5)
WBC # FLD AUTO: 9.44 K/UL — SIGNIFICANT CHANGE UP (ref 3.8–10.5)

## 2024-11-03 PROCEDURE — 93005 ELECTROCARDIOGRAM TRACING: CPT

## 2024-11-03 PROCEDURE — 99284 EMERGENCY DEPT VISIT MOD MDM: CPT

## 2024-11-03 PROCEDURE — 36415 COLL VENOUS BLD VENIPUNCTURE: CPT

## 2024-11-03 PROCEDURE — 84702 CHORIONIC GONADOTROPIN TEST: CPT

## 2024-11-03 PROCEDURE — 80053 COMPREHEN METABOLIC PANEL: CPT

## 2024-11-03 PROCEDURE — 93010 ELECTROCARDIOGRAM REPORT: CPT

## 2024-11-03 PROCEDURE — 85025 COMPLETE CBC W/AUTO DIFF WBC: CPT

## 2024-11-03 PROCEDURE — 99283 EMERGENCY DEPT VISIT LOW MDM: CPT | Mod: 25

## 2024-11-03 RX ORDER — ACETAMINOPHEN 500 MG
650 TABLET ORAL ONCE
Refills: 0 | Status: COMPLETED | OUTPATIENT
Start: 2024-11-03 | End: 2024-11-03

## 2024-11-03 RX ORDER — SODIUM CHLORIDE 9 MG/ML
1000 INJECTION, SOLUTION INTRAMUSCULAR; INTRAVENOUS; SUBCUTANEOUS ONCE
Refills: 0 | Status: COMPLETED | OUTPATIENT
Start: 2024-11-03 | End: 2024-11-03

## 2024-11-03 RX ADMIN — Medication 650 MILLIGRAM(S): at 15:49

## 2024-11-03 RX ADMIN — SODIUM CHLORIDE 1000 MILLILITER(S): 9 INJECTION, SOLUTION INTRAMUSCULAR; INTRAVENOUS; SUBCUTANEOUS at 14:38

## 2024-11-03 NOTE — ED PROVIDER NOTE - CLINICAL SUMMARY MEDICAL DECISION MAKING FREE TEXT BOX
Patient complaining of intermittent episodes of dizziness associated with feeling nervous which has been occurring since at least the summer.  Patient has had many ER visits recently with labs and multiple CTs she relates the workups have always been negative.  Patient had negative MRI as outpt in August.  Patient reports that she was given meclizine has taken it intermittently and usually helps however she became nervous because it did not help today.  Patient reports feeling better at this time.  Patient relates she was recommended to follow-up with specialist but has not as of yet.  No fevers chest pain short of breath abdominal pain nausea vomiting focal weakness numbness rash neck stiffness photophobia.  Patient declining repeat CT.  Patient relates thinks symptoms may be due to anxiety.    Plan EKG labs orthostatics IV fluids if workup negative and patient feels well will discharge refer to neurology as outpatient    Differential including but not limited to arrhythmia electrolyte abnormality dehydration anxiety

## 2024-11-03 NOTE — ED ADULT TRIAGE NOTE - STATUS:
CRYOSURGERY    Your doctor has used a method called cryosurgery to treat your skin condition. Cryosurgery refers to the use of very cold substances to treat a variety of skin conditions such as warts, pre-skin cancers (actinic keratosis), molluscum contagiosum, sun spots, and several benign growths.  The substance we use in cryosurgery is liquid nitrogen and is so cold (-196 degrees Celsius) that it burns when administered.      Following treatment in the office, the skin may immediately burn and become red. You may find the area around the lesion is affected as well.  It is sometimes necessary to treat not only the lesion, but a small area of the surrounding normal skin to achieve a good response.      A blister, and even a blood filled blister, may form after treatment. This is a normal response. It is important that you gently wash the area with soap and warm water as the blister fluid may contain wart virus if a wart was treated.  Do not remove the roof of the blister.      The area treated can take anywhere from 1-3 weeks to heal. Healing time depends on the kind of skin lesion treated, the location, and how aggressively the lesion was treated.  It is recommended that the areas treated are covered with Vaseline ointment and a band-aid.  If a band-aid is not practical, just the ointment applied several times per day will do.  Keeping these areas moist will speed healing time.      Treatment with liquid nitrogen can leave a scar.  In dark skin, it may be a light or dark scar, and in light skin, a white or pink scar.  These will generally fade with time, but may never go away completely.     
Applied

## 2024-11-03 NOTE — ED PROVIDER NOTE - SKIN NEGATIVE STATEMENT, MLM
Medicare Annual Wellness Visit    Erica Vaughn is here for Medicare AWV    Assessment & Plan   Medicare annual wellness visit, subsequent  Mixed hyperlipidemia  -     Comprehensive Metabolic Panel; Future  -     Lipid Panel; Future  Vitamin D deficiency  -     Vitamin D 25 Hydroxy; Future  Age-related osteoporosis without current pathological fracture  Localized osteoarthritis of right shoulder  Pain medication agreement  Heart murmur  -     Echo (TTE) limited (PRN contrast/bubble/strain/3D); Future    Plan  AWV issues discussed as above  Checking routine labs for her lipids and vitamin D deficiency  DEXA is up-to-date. Completed course of therapy  OA of the shoulder, continue tramadol as needed. Controlled substances monitoring: possible medication side effects, risk of tolerance and/or dependence, and alternative treatments discussed, no signs of potential drug abuse or diversion identified, and [place] report reviewed today- activity consistent with treatment plan. Heart murmur is little more pronounced today. Its been 5 years since her last assessment. Although she has seen cardiology sooner than that. I will check an echo to assure stability    Recommendations for Preventive Services Due: see orders and patient instructions/AVS.  Recommended screening schedule for the next 5-10 years is provided to the patient in written form: see Patient Instructions/AVS.     Return in 6 months (on 5/7/2024), or if symptoms worsen or fail to improve, for HTN, HLD, OA gwyn; Medicare Annual Wellness Visit in 1 year. Subjective       Patient's complete Health Risk Assessment and screening values have been reviewed and are found in Flowsheets. The following problems were reviewed today and where indicated follow up appointments were made and/or referrals ordered. Follow Up Narcotic Pain Medication Use Encounter  Date of last encounter for narcotic medication surveillance: 4/18/2023  Clinical Course: No new issues.
no abrasions, no jaundice, no lesions, no pruritis, and no rashes.

## 2024-11-03 NOTE — ED PROVIDER NOTE - NSFOLLOWUPCLINICS_GEN_ALL_ED_FT
[facility]  Psychiatry  [address] [place] [id number]  Phone: [phone]  Fax:   Follow Up Time: 1-3 Days

## 2024-11-03 NOTE — ED PROVIDER NOTE - NSFOLLOWUPINSTRUCTIONS_ED_ALL_ED_FT
### Patient Discharge Instructions for Dizziness    **Diagnosis:** Dizziness    **General Instructions:**  1. **Rest**: Sit or lie down until your symptoms improve. Avoid sudden movements or positions that may exacerbate dizziness.  2. **Hydration**: Drink plenty of fluids, especially water. Dehydration can contribute to dizziness.  3. **Gradual Movements**: When getting up from sitting or lying down, do so slowly to prevent a sudden drop in blood pressure.  4. **Avoid Triggers**: Identify and avoid any known triggers for your dizziness (e.g., bright lights, certain positions).  5. **Safety Precautions**: Ensure your environment is safe. Remove tripping hazards and consider using a cane or walker if needed.    **Symptom Management:**  - **Over-the-Counter Medications**: If advised by your healthcare provider, you may take **Tylenol (Acetaminophen)** 500-1000 mg for any accompanying headache. Do not exceed 3000 mg in 24 hours.    **Follow-Up:**  - Schedule a follow-up appointment with your healthcare provider within the next week or sooner if symptoms worsen.  - Contact your provider if you experience persistent dizziness, fainting, or any other concerning symptoms.    **When to Seek Immediate Care:**  - Severe dizziness that interferes with daily activities  - Difficulty speaking or understanding speech  - Weakness or numbness in your arms or legs  - Vision changes  - Severe headache    **Note:** Always take medications as directed and consult with your healthcare provider before making any changes. Inform your provider of any allergies or other medical conditions.    ---    If you have any questions or concerns about your recovery, please don’t hesitate to reach out!    ### Patient Discharge Instructions for Untreated Anxiety    **Diagnosis:** Anxiety (Untreated)    **General Instructions:**  1. **Rest and Self-Care**: Prioritize rest and self-care. Ensure you get adequate sleep and take breaks as needed throughout the day.  2. **Hydration and Nutrition**: Drink plenty of water and maintain a balanced diet. Avoid excessive caffeine or sugar, which can exacerbate anxiety symptoms.  3. **Physical Activity**: Engage in light physical activity, such as walking or stretching, to help reduce tension and improve mood.  4. **Mindfulness and Relaxation Techniques**: Practice mindfulness, deep breathing exercises, or meditation to help manage anxiety. Apps or online resources can guide you through these techniques.  5. **Limit Stressors**: Identify and minimize exposure to known stressors or triggers whenever possible.    **Symptom Management:**  - **Over-the-Counter Support**: If advised by your healthcare provider, you may consider using **Tylenol (Acetaminophen)** for any accompanying physical symptoms, such as headaches. Do not exceed 3000 mg in 24 hours.    **Follow-Up:**  - Schedule a follow-up appointment with your healthcare provider to discuss treatment options for your anxiety, including therapy and medication.  - Monitor your symptoms and note any changes to share during your follow-up.    **When to Seek Immediate Care:**  - Severe anxiety that interferes with daily activities  - Panic attacks or feelings of losing control  - Thoughts of self-harm or harm to others  - Inability to function in daily life    **Note:** Always take medications as directed and consult with your healthcare provider before making any changes. Inform your provider of any allergies or other medical conditions.    ---    If you have any questions or concerns about managing your anxiety, please feel free to reach out!

## 2024-11-03 NOTE — ED PROVIDER NOTE - PROVIDER TOKENS
FREE:[LAST:[your doctor],PHONE:[(   )    -],FAX:[(   )    -],FOLLOWUP:[1-3 Days]],FREE:[LAST:[donnie],PHONE:[(   )    -],FAX:[(   )    -],FOLLOWUP:[1-3 Days]]

## 2024-11-03 NOTE — ED ADULT TRIAGE NOTE - CHIEF COMPLAINT QUOTE
I have hx of vertigo and took meclizine but doesn't help. patient stated she has been feeling about this for one week

## 2024-11-03 NOTE — ED PROVIDER NOTE - DIFFERENTIAL DIAGNOSIS
Differential including but not limited to arrhythmia electrolyte abnormality dehydration anxiety Differential Diagnosis

## 2024-11-03 NOTE — ED PROVIDER NOTE - CARE PROVIDER_API CALL
your doctor,   Phone: (   )    -  Fax: (   )    -  Follow Up Time: 1-3 Days    donnie,   Phone: (   )    -  Fax: (   )    -  Follow Up Time: 1-3 Days

## 2024-11-03 NOTE — ED PROVIDER NOTE - OBJECTIVE STATEMENT
19-year-old female with a history of migraines, ADHD, autism, anxiety presents with c/o dizziness that is not resolved by meclizine.   Patient has had multiple ED visits for the same and has had multiple workups including 2 head CTs that are all WNL between 9/23 and as recently as 10/20. Patient PCP is Dr. Roberto Bashir in [place]. Patient lives with her father but states that he is generally not home.  During the assessment patient appeared very anxious and asked if anxiety can cause her symptoms.   Denies fever, chills.   Denies HA, ++dizziness, denies lightheadedness.   Denies changes in vision, blurred or loss of vision.   Denies CP, SOB or palpitations.   Denies abd pain, n/v/d/c.   Denies urinary symptoms, dysuria, frequency or urgency.   Denies musculoskeletal pain, numbness or tingling.

## 2024-11-03 NOTE — ED PROVIDER NOTE - PATIENT PORTAL LINK FT
You can access the FollowMyHealth Patient Portal offered by [facility] by registering at the following website: [email or web address]. By joining [employer]’s FollowMyHealth portal, you will also be able to view your health information using other applications (apps) compatible with our system.

## 2024-11-07 ENCOUNTER — EMERGENCY (EMERGENCY)
Facility: HOSPITAL | Age: 19
LOS: 1 days | Discharge: HOME | End: 2024-11-07
Attending: STUDENT IN AN ORGANIZED HEALTH CARE EDUCATION/TRAINING PROGRAM
Payer: MEDICAID

## 2024-11-07 ENCOUNTER — EMERGENCY (EMERGENCY)
Facility: HOSPITAL | Age: 19
LOS: 1 days | Discharge: HOME | End: 2024-11-07
Attending: EMERGENCY MEDICINE | Admitting: EMERGENCY MEDICINE
Payer: MEDICAID

## 2024-11-07 VITALS
OXYGEN SATURATION: 98 % | RESPIRATION RATE: 18 BRPM | DIASTOLIC BLOOD PRESSURE: 70 MMHG | HEART RATE: 98 BPM | SYSTOLIC BLOOD PRESSURE: 104 MMHG | TEMPERATURE: 98 F

## 2024-11-07 VITALS
OXYGEN SATURATION: 99 % | DIASTOLIC BLOOD PRESSURE: 84 MMHG | HEART RATE: 90 BPM | RESPIRATION RATE: 20 BRPM | TEMPERATURE: 98 F | HEIGHT: 63 IN | SYSTOLIC BLOOD PRESSURE: 127 MMHG

## 2024-11-07 VITALS
HEART RATE: 81 BPM | TEMPERATURE: 98 F | OXYGEN SATURATION: 100 % | RESPIRATION RATE: 17 BRPM | DIASTOLIC BLOOD PRESSURE: 78 MMHG | SYSTOLIC BLOOD PRESSURE: 116 MMHG

## 2024-11-07 VITALS
OXYGEN SATURATION: 100 % | WEIGHT: 128.09 LBS | HEART RATE: 110 BPM | DIASTOLIC BLOOD PRESSURE: 80 MMHG | HEIGHT: 63 IN | SYSTOLIC BLOOD PRESSURE: 119 MMHG | TEMPERATURE: 99 F | RESPIRATION RATE: 16 BRPM

## 2024-11-07 DIAGNOSIS — Z78.9 OTHER SPECIFIED HEALTH STATUS: Chronic | ICD-10-CM

## 2024-11-07 LAB
ANION GAP SERPL CALC-SCNC: 15 MMOL/L — SIGNIFICANT CHANGE UP (ref 5–17)
BASOPHILS # BLD AUTO: 0.09 K/UL — SIGNIFICANT CHANGE UP (ref 0–0.2)
BASOPHILS NFR BLD AUTO: 1 % — SIGNIFICANT CHANGE UP (ref 0–2)
BUN SERPL-MCNC: 12 MG/DL — SIGNIFICANT CHANGE UP (ref 7–23)
CALCIUM SERPL-MCNC: 9.8 MG/DL — SIGNIFICANT CHANGE UP (ref 8.4–10.5)
CHLORIDE SERPL-SCNC: 103 MMOL/L — SIGNIFICANT CHANGE UP (ref 96–108)
CO2 SERPL-SCNC: 19 MMOL/L — LOW (ref 22–31)
CREAT SERPL-MCNC: 0.61 MG/DL — SIGNIFICANT CHANGE UP (ref 0.5–1.3)
EGFR: 130 ML/MIN/1.73M2 — SIGNIFICANT CHANGE UP
EGFR: 130 ML/MIN/1.73M2 — SIGNIFICANT CHANGE UP
EOSINOPHIL # BLD AUTO: 0.1 K/UL — SIGNIFICANT CHANGE UP (ref 0–0.5)
EOSINOPHIL NFR BLD AUTO: 1.1 % — SIGNIFICANT CHANGE UP (ref 0–6)
GLUCOSE SERPL-MCNC: 97 MG/DL — SIGNIFICANT CHANGE UP (ref 70–99)
HCT VFR BLD CALC: 39.8 % — SIGNIFICANT CHANGE UP (ref 34.5–45)
HGB BLD-MCNC: 12.8 G/DL — SIGNIFICANT CHANGE UP (ref 11.5–15.5)
IMM GRANULOCYTES NFR BLD AUTO: 0.4 % — SIGNIFICANT CHANGE UP (ref 0–0.9)
LYMPHOCYTES # BLD AUTO: 3.29 K/UL — SIGNIFICANT CHANGE UP (ref 1–3.3)
LYMPHOCYTES # BLD AUTO: 35.3 % — SIGNIFICANT CHANGE UP (ref 13–44)
MCHC RBC-ENTMCNC: 27.8 PG — SIGNIFICANT CHANGE UP (ref 27–34)
MCHC RBC-ENTMCNC: 32.2 G/DL — SIGNIFICANT CHANGE UP (ref 32–36)
MCV RBC AUTO: 86.5 FL — SIGNIFICANT CHANGE UP (ref 80–100)
MONOCYTES # BLD AUTO: 0.69 K/UL — SIGNIFICANT CHANGE UP (ref 0–0.9)
MONOCYTES NFR BLD AUTO: 7.4 % — SIGNIFICANT CHANGE UP (ref 2–14)
NEUTROPHILS # BLD AUTO: 5.1 K/UL — SIGNIFICANT CHANGE UP (ref 1.8–7.4)
NEUTROPHILS NFR BLD AUTO: 54.8 % — SIGNIFICANT CHANGE UP (ref 43–77)
NRBC # BLD: 0 /100 WBCS — SIGNIFICANT CHANGE UP (ref 0–0)
NRBC BLD-RTO: 0 /100 WBCS — SIGNIFICANT CHANGE UP (ref 0–0)
PLATELET # BLD AUTO: 343 K/UL — SIGNIFICANT CHANGE UP (ref 150–400)
POTASSIUM SERPL-MCNC: 4.1 MMOL/L — SIGNIFICANT CHANGE UP (ref 3.5–5.3)
POTASSIUM SERPL-SCNC: 4.1 MMOL/L — SIGNIFICANT CHANGE UP (ref 3.5–5.3)
RBC # BLD: 4.6 M/UL — SIGNIFICANT CHANGE UP (ref 3.8–5.2)
RBC # FLD: 12.8 % — SIGNIFICANT CHANGE UP (ref 10.3–14.5)
SODIUM SERPL-SCNC: 137 MMOL/L — SIGNIFICANT CHANGE UP (ref 135–145)
WBC # BLD: 9.31 K/UL — SIGNIFICANT CHANGE UP (ref 3.8–10.5)
WBC # FLD AUTO: 9.31 K/UL — SIGNIFICANT CHANGE UP (ref 3.8–10.5)

## 2024-11-07 PROCEDURE — 99284 EMERGENCY DEPT VISIT MOD MDM: CPT | Mod: 25

## 2024-11-07 PROCEDURE — 99283 EMERGENCY DEPT VISIT LOW MDM: CPT | Mod: 25

## 2024-11-07 PROCEDURE — 80048 BASIC METABOLIC PNL TOTAL CA: CPT

## 2024-11-07 PROCEDURE — 93005 ELECTROCARDIOGRAM TRACING: CPT

## 2024-11-07 PROCEDURE — 99285 EMERGENCY DEPT VISIT HI MDM: CPT

## 2024-11-07 PROCEDURE — 85025 COMPLETE CBC W/AUTO DIFF WBC: CPT

## 2024-11-07 RX ORDER — DIAZEPAM 5 MG/1
5 TABLET ORAL ONCE
Refills: 0 | Status: DISCONTINUED | OUTPATIENT
Start: 2024-11-07 | End: 2024-11-07

## 2024-11-07 RX ADMIN — DIAZEPAM 5 MILLIGRAM(S): 5 TABLET ORAL at 05:00

## 2024-11-07 NOTE — ED ADULT NURSE NOTE - CAS TRG GEN SKIN COLOR
April 13, 2021     Patient: Li Gerber   YOB: 2007   Date of Visit: 4/13/2021       To Whom it May Concern:    Li Gerber is under my professional care  She was seen in my office on 4/13/2021  Please allow Astrid to leave early from her classes  She may not participate in gym class at this time  If you have any questions or concerns, please don't hesitate to call           Sincerely,          Michael Olivas DO        CC: No Recipients Normal for race

## 2024-11-07 NOTE — ED ADULT NURSE NOTE - OBJECTIVE STATEMENT
Patient presents to the emergency department with complaints of chest tightness when she takes a deep breath and some mild back pain.  Patient states she started Levaquin 2 days ago by a ENT doctor for a bacteria in her nose.  Patient is concerned that the 2 doses of Levaquin she has taken have caused an aortic aneurysm giving her the symptoms.  Patient also asking if I think that the back pain is being caused by meningitis.  Patient noted to have 18 emergency department visits in the last 7 weeks.  Patient is not complaining of shortness of breath but states she just feels a tightness.

## 2024-11-07 NOTE — ED PROVIDER NOTE - CROS ED ROS STATEMENT
all other ROS negative except as per HPI [Restricted in physically strenuous activity but ambulatory and able to carry out work of a light or sedentary nature] : Status 1- Restricted in physically strenuous activity but ambulatory and able to carry out work of a light or sedentary nature, e.g., light house work, office work [Thin] : thin [Normal] : affect appropriate [de-identified] : No icterus  [de-identified] : MMM O/P Clear [de-identified] : Supple No LAD  [de-identified] : Clear [de-identified] : S1 S2. No carotid bruit appreciated [de-identified] : No edema [de-identified] : Mildly distended, Firm  [de-identified] : No spine/CVA tenderness [de-identified] : Ambulatory

## 2024-11-07 NOTE — ED PROVIDER NOTE - CLINICAL SUMMARY MEDICAL DECISION MAKING FREE TEXT BOX
Patient with complaints of chest tightness on deep breath was 2 days after starting Levaquin.  Patient concerned about complications from the medication.  Patient noted to have frequent ED visits with no significant physical findings at this time.  Will reassure patient and suggest she discuss changing the medication with her ENT if the symptoms are bothersome.

## 2024-11-07 NOTE — ED PROVIDER NOTE - PATIENT PORTAL LINK FT
You can access the FollowMyHealth Patient Portal offered by [facility] by registering at the following website: [email or web address]. By joining [employer]’s FollowMyHealth portal, you will also be able to view your health information using other applications (apps) compatible with our system.

## 2024-11-07 NOTE — ED ADULT NURSE NOTE - ASSOCIATED SYMPTOMS
· HPI Objective Statement: Patient presents to the emergency department with complaints of chest tightness when she takes a deep breath and some mild back pain.  Patient states she started Levaquin 2 days ago by a ENT doctor for a bacteria in her nose.  Patient is concerned that the 2 doses of Levaquin she has taken have caused an aortic aneurysm giving her the symptoms.  Patient also asking if I think that the back pain is being caused by meningitis.  Patient noted to have 18 emergency department visits in the last 7 weeks.  Patient is not complaining of shortness of breath but states she just feels a tightness.

## 2024-11-07 NOTE — ED PROVIDER NOTE - NSFOLLOWUPINSTRUCTIONS_ED_ALL_ED_FT
Levofloxacin Tablets  What is this medication?  LEVOFLOXACIN (german weaver DIEGO gabriela sin) treats infections caused by bacteria. It belongs to a group of medications called quinolone antibiotics. It will not treat colds, the flu, or infections caused by viruses.    This medicine may be used for other purposes; ask your health care provider or pharmacist if you have questions.    COMMON BRAND NAME(S): Levaquin, Levaquin Leva-Alexy    What should I tell my care team before I take this medication?  They need to know if you have any of these conditions:    Bone, joint, or tendon problems  Diabetes  Heart disease  History of irregular heartbeat or rhythm  Low levels of potassium in the blood  Kidney disease  Liver disease  Myasthenia gravis  Seizures  Tingling of the fingers or toes or other nerve disorder  An unusual or allergic reaction to levofloxacin, other medications, foods, dyes, or preservatives  Pregnant or trying to get pregnant  Breastfeeding  How should I use this medication?  Take this medication by mouth with a full glass of water. Follow the directions on the prescription label. You can take it with or without food. If it upsets your stomach, take it with food. Take your medication at regular intervals. Do not take your medication more often than directed. Take all of your medication as directed even if you think you are better. Do not skip doses or stop your medication early.    Avoid antacids, calcium, iron, and zinc products for 2 hours before and 2 hours after taking a dose of this medication.    A special MedGuide will be given to you by the pharmacist with each prescription and refill. Be sure to read this information carefully each time.    Talk to your care team about the use of this medication in children. While this medication may be prescribed for children as young as 6 months for selected conditions, precautions do apply.    Overdosage: If you think you have taken too much of this medicine contact a poison control center or emergency room at once.    NOTE: This medicine is only for you. Do not share this medicine with others.    What if I miss a dose?  If you miss a dose, take it as soon as you remember. If it is almost time for your next dose, take only that dose. Do not take double or extra doses.    What may interact with this medication?  Do not take this medication with any of the following:    Cisapride  Dronedarone  Pimozide  Thioridazine  This medication may also interact with the following:    Antacids  Certain medications for diabetes, such as glipizide, glyburide, or insulin  Certain medications that treat or prevent blood clots, such as warfarin  Didanosine buffered tablets or powder  Estrogen or progestin hormones  Multivitamins  NSAIDS, medications for pain and inflammation, such as ibuprofen or naproxen  Other medications that cause heart rhythm changes, such as dofetilide, ziprasidone  Steroid medications, such as prednisone or cortisone  Sucralfate  Theophylline  This list may not describe all possible interactions. Give your health care provider a list of all the medicines, herbs, non-prescription drugs, or dietary supplements you use. Also tell them if you smoke, drink alcohol, or use illegal drugs. Some items may interact with your medicine.    What should I watch for while using this medication?  Visit your care team for regular checks on your progress. Tell your care team if your symptoms do not start to get better or if they get worse.    This medication may affect your coordination, reaction time, or judgment. Do not drive or operate machinery until you know how this medication affects you. Sit up or stand slowly to reduce the risk of dizzy or fainting spells. Drinking alcohol with this medication can increase the risk of these side effects.    Do not treat diarrhea with over the counter products. Contact your care team if you have diarrhea that lasts more than 2 days or if it is severe and watery.    This medication can make you more sensitive to the sun. Keep out of the sun. If you cannot avoid being in the sun, wear protective clothing and sunscreen. Do not use sun lamps, tanning beds, or tanning booths.    This medication may cause tendon problems. Tendons are the cords of tissue that connect your muscles to your bones. Tell your care team right away if you have pain, swelling, or stiffness while you are taking this medication or after you have stopped treatment. The risk is higher in people older than 60 years of age, those taking steroid medications, and those who have had a kidney, heart, or lung transplant.    This medication may worsen muscle weakness in people with myasthenia gravis. This can cause breathing problems. Call your care team right away if you have myasthenia gravis and have worsening symptoms while taking this medication.    This medication may cause serious skin reactions. They can happen weeks to months after starting the medication. Contact your care team right away if you notice fevers or flu-like symptoms with a rash. The rash may be red or purple and then turn into blisters or peeling of the skin. You may also notice a red rash with swelling of the face, lips, or lymph nodes in your neck or under your arms.    Tell your care team if you are taking medications to treat diabetes. This medication may cause changes to blood sugar levels. Talk to your care team about how often to check your blood sugar while taking this medication. Know the symptoms of low blood sugar and how to treat it.    What side effects may I notice from receiving this medication?  Side effects that you should report to your care team as soon as possible:    Allergic reactions—skin rash, itching, hives, swelling of the face, lips, tongue, or throat  Heart rhythm changes—fast or irregular heartbeat, dizziness, feeling faint or lightheaded, chest pain, trouble breathing  Increased pressure around the brain—severe headache, blurry vision, change in vision, nausea, vomiting  Joint, muscle, or tendon pain, swelling, or stiffness  Liver injury—right upper belly pain, loss of appetite, nausea, light-colored stool, dark yellow or brown urine, yellowing skin or eyes, unusual weakness or fatigue  Mood and behavior changes—anxiety, nervousness, confusion, hallucinations, irritability, hostility, thoughts of suicide or self-harm, worsening mood, feelings of depression  Pain, tingling, or numbness in the hands or feet  Redness, blistering, peeling, or loosening of the skin, including inside the mouth  Seizures  Severe diarrhea, fever  Sudden or severe chest, back, or stomach pain  Unusual vaginal discharge, itching, or odor  Side effects that usually do not require medical attention (report these to your care team if they continue or are bothersome):    Diarrhea  Dizziness  Headache  Nausea  Skin reactions on sun-exposed areas  Trouble sleeping  This list may not describe all possible side effects. Call your doctor for medical advice about side effects. You may report side effects to FDA at 6-353-FDA-3640.    Where should I keep my medication?  Keep out of the reach of children.    Store at room temperature between 15 and 30 degrees C (59 and 86 degrees F). Keep in a tightly closed container. Throw away any unused medication after the expiration date.    NOTE: This sheet is a summary. It may not cover all possible information. If you have questions about this medicine, talk to your doctor, pharmacist, or health care provider.

## 2024-11-14 ENCOUNTER — EMERGENCY (EMERGENCY)
Facility: HOSPITAL | Age: 19
LOS: 1 days | Discharge: HOME | End: 2024-11-14
Attending: EMERGENCY MEDICINE | Admitting: EMERGENCY MEDICINE
Payer: MEDICAID

## 2024-11-14 VITALS
RESPIRATION RATE: 16 BRPM | WEIGHT: 125 LBS | TEMPERATURE: 99 F | OXYGEN SATURATION: 98 % | DIASTOLIC BLOOD PRESSURE: 79 MMHG | HEART RATE: 97 BPM | SYSTOLIC BLOOD PRESSURE: 119 MMHG | HEIGHT: 63 IN

## 2024-11-14 VITALS
DIASTOLIC BLOOD PRESSURE: 80 MMHG | OXYGEN SATURATION: 98 % | RESPIRATION RATE: 16 BRPM | HEART RATE: 88 BPM | TEMPERATURE: 98 F | SYSTOLIC BLOOD PRESSURE: 118 MMHG

## 2024-11-14 DIAGNOSIS — Z78.9 OTHER SPECIFIED HEALTH STATUS: Chronic | ICD-10-CM

## 2024-11-14 LAB
ALBUMIN SERPL ELPH-MCNC: 4.2 G/DL — SIGNIFICANT CHANGE UP (ref 3.3–5)
ALP SERPL-CCNC: 57 U/L — SIGNIFICANT CHANGE UP (ref 30–120)
ALT FLD-CCNC: 22 U/L — SIGNIFICANT CHANGE UP (ref 10–60)
ANION GAP SERPL CALC-SCNC: 8 MMOL/L — SIGNIFICANT CHANGE UP (ref 5–17)
AST SERPL-CCNC: 14 U/L — SIGNIFICANT CHANGE UP (ref 10–40)
BASOPHILS # BLD AUTO: 0.07 K/UL — SIGNIFICANT CHANGE UP (ref 0–0.2)
BASOPHILS NFR BLD AUTO: 1 % — SIGNIFICANT CHANGE UP (ref 0–2)
BILIRUB SERPL-MCNC: 0.4 MG/DL — SIGNIFICANT CHANGE UP (ref 0.2–1.2)
BUN SERPL-MCNC: 13 MG/DL — SIGNIFICANT CHANGE UP (ref 7–23)
CALCIUM SERPL-MCNC: 9.7 MG/DL — SIGNIFICANT CHANGE UP (ref 8.4–10.5)
CHLORIDE SERPL-SCNC: 106 MMOL/L — SIGNIFICANT CHANGE UP (ref 96–108)
CO2 SERPL-SCNC: 29 MMOL/L — SIGNIFICANT CHANGE UP (ref 22–31)
CREAT SERPL-MCNC: 0.7 MG/DL — SIGNIFICANT CHANGE UP (ref 0.5–1.3)
EGFR: 126 ML/MIN/1.73M2 — SIGNIFICANT CHANGE UP
EOSINOPHIL # BLD AUTO: 0.06 K/UL — SIGNIFICANT CHANGE UP (ref 0–0.5)
EOSINOPHIL NFR BLD AUTO: 0.8 % — SIGNIFICANT CHANGE UP (ref 0–6)
GLUCOSE SERPL-MCNC: 80 MG/DL — SIGNIFICANT CHANGE UP (ref 70–99)
HCG SERPL-ACNC: <1 MIU/ML — SIGNIFICANT CHANGE UP
HCT VFR BLD CALC: 36.1 % — SIGNIFICANT CHANGE UP (ref 34.5–45)
HGB BLD-MCNC: 11.8 G/DL — SIGNIFICANT CHANGE UP (ref 11.5–15.5)
IMM GRANULOCYTES NFR BLD AUTO: 0.5 % — SIGNIFICANT CHANGE UP (ref 0–0.9)
LYMPHOCYTES # BLD AUTO: 1.89 K/UL — SIGNIFICANT CHANGE UP (ref 1–3.3)
LYMPHOCYTES # BLD AUTO: 25.8 % — SIGNIFICANT CHANGE UP (ref 13–44)
MCHC RBC-ENTMCNC: 28.4 PG — SIGNIFICANT CHANGE UP (ref 27–34)
MCHC RBC-ENTMCNC: 32.7 G/DL — SIGNIFICANT CHANGE UP (ref 32–36)
MCV RBC AUTO: 86.8 FL — SIGNIFICANT CHANGE UP (ref 80–100)
MONOCYTES # BLD AUTO: 0.49 K/UL — SIGNIFICANT CHANGE UP (ref 0–0.9)
MONOCYTES NFR BLD AUTO: 6.7 % — SIGNIFICANT CHANGE UP (ref 2–14)
NEUTROPHILS # BLD AUTO: 4.77 K/UL — SIGNIFICANT CHANGE UP (ref 1.8–7.4)
NEUTROPHILS NFR BLD AUTO: 65.2 % — SIGNIFICANT CHANGE UP (ref 43–77)
NRBC # BLD: 0 /100 WBCS — SIGNIFICANT CHANGE UP (ref 0–0)
PLATELET # BLD AUTO: 319 K/UL — SIGNIFICANT CHANGE UP (ref 150–400)
POTASSIUM SERPL-MCNC: 3.7 MMOL/L — SIGNIFICANT CHANGE UP (ref 3.5–5.3)
POTASSIUM SERPL-SCNC: 3.7 MMOL/L — SIGNIFICANT CHANGE UP (ref 3.5–5.3)
PROT SERPL-MCNC: 7.3 G/DL — SIGNIFICANT CHANGE UP (ref 6–8.3)
RBC # BLD: 4.16 M/UL — SIGNIFICANT CHANGE UP (ref 3.8–5.2)
RBC # FLD: 13.2 % — SIGNIFICANT CHANGE UP (ref 10.3–14.5)
SODIUM SERPL-SCNC: 143 MMOL/L — SIGNIFICANT CHANGE UP (ref 135–145)
WBC # BLD: 7.32 K/UL — SIGNIFICANT CHANGE UP (ref 3.8–10.5)
WBC # FLD AUTO: 7.32 K/UL — SIGNIFICANT CHANGE UP (ref 3.8–10.5)

## 2024-11-14 PROCEDURE — 99283 EMERGENCY DEPT VISIT LOW MDM: CPT

## 2024-11-14 PROCEDURE — 36415 COLL VENOUS BLD VENIPUNCTURE: CPT

## 2024-11-14 PROCEDURE — 85025 COMPLETE CBC W/AUTO DIFF WBC: CPT

## 2024-11-14 PROCEDURE — 80053 COMPREHEN METABOLIC PANEL: CPT

## 2024-11-14 PROCEDURE — 99284 EMERGENCY DEPT VISIT MOD MDM: CPT

## 2024-11-14 PROCEDURE — 84702 CHORIONIC GONADOTROPIN TEST: CPT

## 2024-11-14 NOTE — ED PROVIDER NOTE - PROGRESS NOTE DETAILS
pcp follow up All abs reviewed. all results reviewed with pt including abnormal results. pt given a copy of results. pt advised to follow up with pmd regarding abnormal results. All questions answered and concerns addressed. pt verbalized understanding and agreement with plan and dx. pt advised on next step and when/where to follow up. pt advised on all take home and otc medications. pt advised to follow up with PMD. pt advised to return to ed for worsenng symptoms including fever, cp, sob. will dc.

## 2024-11-14 NOTE — ED PROVIDER NOTE - PATIENT PORTAL LINK FT
You can access the FollowMyHealth Patient Portal offered by [facility] by registering at the following website: [email or web address]. By joining [employer]’s FollowMyHealth portal, you will also be able to view your health information using other applications (apps) compatible with our system.

## 2024-11-14 NOTE — ED ADULT NURSE NOTE - OBJECTIVE STATEMENT
Pt came in ambulatory complains of rectal bleeding x 1 week now. Pt also complains of mild lower generalized abdominal pain. No vomiting or diarrhea

## 2024-11-14 NOTE — ED PROVIDER NOTE - CLINICAL SUMMARY MEDICAL DECISION MAKING FREE TEXT BOX
19-year-old female with a history of anxiety, ADHD, autism presents with has some generalized fatigue.  The patient states that she had some recent dark stools.  Patient was seen at [facility] and had labs with no acute findings.  The patient states that her hemoglobin a bit on the lower side of normal.  The patient is to follow-up with gastroenterology, but has not yet made the appointment.  No acute abdominal pain.  No vomiting or diarrhea.  No chest pain or shortness of breath.  No weakness or dizziness.  No numbness or tingling.  No focal weakness.  No aggravating or alleviating factors otherwise noted.  No other acute complaints.  Exam: Nontoxic, well-appearing.  Normal respiratory effort.  CTA BL, no W/R/R.  Abdomen soft, nontender, nondistended.  No HSM.  No CVAT.  Normal nonfocal neurologic exam.  No C/C/E.  No other acute findings on exam.  No mucous membrane pallor.  Acute fatigue, otherwise asymptomatic.  Patient with recent dark stools.  Patient will follow-up with gastroenterology, will check labs, outpatient follow-up.

## 2024-11-14 NOTE — ED PROVIDER NOTE - NSFOLLOWUPINSTRUCTIONS_ED_ALL_ED_FT
Weakness    WHAT YOU NEED TO KNOW:    Weakness is a loss of muscle strength. It may be caused by brain, nerve, or muscle problems. Physical and mental conditions such as heart problems, pregnancy, dehydration, or depression may also cause weakness. Reactions to certain drugs can cause weakness. Parts of your body may become weak if you need to wear a cast or splint or have been on bed rest for a long time.    DISCHARGE INSTRUCTIONS:    Call 911 for any of the following:    You have any of the following signs of a stroke:  Numbness or drooping on one side of your face    Weakness in an arm or leg    Confusion or difficulty speaking    Dizziness, a severe headache, or vision loss    You lose feeling in your weakened body area.    You have electric shock-like feelings down your arms and legs when you flex or move your neck.    You have sudden or increased trouble speaking, swallowing, or breathing.  Return to the emergency department if:    You have severe pain in your back, arms, or legs that worsens.    You have sudden or worsened muscle weakness or loss of movement.    You are not able to control when you urinate or have a bowel movement.  Contact your healthcare provider if:    You feel depressed or anxious.    You have questions or concerns about your condition or care.  Manage weakness:    Use assistive devices as directed. These help protect you from injury. Examples include a walker or cane. Have someone install handrails in your home. These will help you get out of a bathtub or stand up from a toilet. Use a shower chair so you can sit while you shower. Sit down on the toilet or another chair to dry off and put on your clothes. Get help going up and down stairs if your legs are weak.    Go to physical or occupational therapy if directed. A physical therapist can teach you exercises to help strengthen weak muscles. An occupational therapist can show you ways to do your daily activities more easily. For example, light forks and spoons can be easier to use if you have hand weakness. You may also learn ways to organize your household items so you are not moving heavy items.    Balance rest with exercise. Exercise can help increase your muscle strength and energy. Do not exercise for long periods at a time. Take breaks often to rest. Too much exercise can cause muscle strain or make you more tired. Ask your healthcare provider how much exercise is right for you.    Eat a variety of healthy foods. Too much or too little food may cause weakness or tiredness. Ask your healthcare provider what a healthy amount of food is for you. Healthy foods include fruits, vegetables, whole-grain breads, low-fat dairy products, lean meats and fish, nuts, and cooked beans.    Do not smoke. Nicotine and other chemicals in cigarettes and cigars can make your symptoms worse, and can cause lung damage. Ask your healthcare provider for information if you currently smoke and need help to quit. E-cigarettes or smokeless tobacco still contain nicotine. Talk to your healthcare provider before you use these products.    Do not use caffeine, alcohol, or illegal drugs. These may cause muscle twitching, which could lead to worsened weakness.  Follow up with your healthcare provider as directed: Write down your questions so you remember to ask them during your visits.    © [employer] 1973, 2024    	  back to top            © [employer] 1973, 2024

## 2024-11-14 NOTE — ED PROVIDER NOTE - OBJECTIVE STATEMENT
Patient is a 19-year-old female with past medical history of anxiety, ADHD, autism presents with generalized weakness.  Patient reports she has had dark stool for the past few days.  Patient was seen at [facility] had labs without acute findings.  Patient noted that her labs were on the lower side of normal.  Patient was supposed to follow-up with gastroenterology but has not made an appointment yet.  Patient reports she feels generally weak.  Patient was seen 19 times over the past 2 months and emergency rooms without acute findings.  Patient has fever chest pain shortness of breath nausea vomiting headache loss of consciousness

## 2024-11-14 NOTE — ED ADULT TRIAGE NOTE - CHIEF COMPLAINT QUOTE
Patient comes in with black stools, fatigue, and weakness x1 week. Patient states she was recently in another ED and told she has anemia. Denies additional complaints.

## 2024-11-17 ENCOUNTER — EMERGENCY (EMERGENCY)
Facility: HOSPITAL | Age: 19
LOS: 1 days | Discharge: HOME | End: 2024-11-17
Attending: EMERGENCY MEDICINE | Admitting: EMERGENCY MEDICINE
Payer: MEDICAID

## 2024-11-17 VITALS
TEMPERATURE: 98 F | WEIGHT: 137.35 LBS | RESPIRATION RATE: 16 BRPM | SYSTOLIC BLOOD PRESSURE: 108 MMHG | HEIGHT: 63 IN | OXYGEN SATURATION: 100 % | DIASTOLIC BLOOD PRESSURE: 70 MMHG | HEART RATE: 83 BPM

## 2024-11-17 DIAGNOSIS — Z78.9 OTHER SPECIFIED HEALTH STATUS: Chronic | ICD-10-CM

## 2024-11-17 PROCEDURE — 99282 EMERGENCY DEPT VISIT SF MDM: CPT

## 2024-11-17 PROCEDURE — 99283 EMERGENCY DEPT VISIT LOW MDM: CPT

## 2024-11-17 NOTE — ED ADULT NURSE REASSESSMENT NOTE - NS ED NURSE REASSESS COMMENT FT1
pt took Tylenol prior to arrival. did not want medication. instructed to follow up with neurology. d/c home in [facility]. ambulated unassisted

## 2024-11-17 NOTE — ED PROVIDER NOTE - PATIENT PORTAL LINK FT
You can access the FollowMyHealth Patient Portal offered by [facility] by registering at the following website: [email or web address]. By joining [employer]’s FollowMyHealth portal, you will also be able to view your health information using other applications (apps) compatible with our system.

## 2024-11-17 NOTE — ED PROVIDER NOTE - NSFOLLOWUPINSTRUCTIONS_ED_ALL_ED_FT
Follow up with your neurologist.      What is a migraine headache? A migraine is a severe headache. The pain can be so severe that it interferes with your daily activities. A migraine can last a few hours up to several days. The exact cause of migraines is not known. A family history of migraines increases your risk. Your risk is also higher if you are a woman or take medicines such as estrogen or a vasodilator.    What are the warning signs that a migraine headache is about to start? Warning signs usually start 15 to 60 minutes before the headache:    Visual changes (auras), such as blurred vision, temporary blind or bright spots, lines, or hallucinations    Unusual tiredness or frequent yawning    Tingling in an arm or leg  What are the signs and symptoms of a migraine headache? A migraine headache usually begins as a dull ache around the eye or temple. The pain may get worse with movement. You may also have the following:    Pain in your head that may increase to the point that you cannot do everyday activities    Pain on one or both sides of your head    Throbbing, pulsing, or pounding pain in your head    Nausea and vomiting    Sensitivity to light, noise, or smells  Headache Types  What can trigger a migraine headache?    Stress, eye strain, oversleeping, or not getting enough sleep    Hormone changes in women from birth control pills, pregnancy, menopause, or during a monthly period    Skipping meals, going too long without eating, or not drinking enough liquids    Certain foods or drinks such as chocolate, hard cheese, alcohol, or drinks that contain caffeine    Foods that contain gluten, nitrates, MSG, or artificial sweeteners    Sunlight, bright or flashing lights, loud noises, smoke, or strong smells    Heat, humidity, or changes in the weather  How is a migraine headache diagnosed? Your healthcare provider will ask about your headaches. Describe the pain and any other symptoms, such as nausea. Tell the provider if you think anything triggered the pain. The provider will also want to know what you ate and drank before the pain started. Tell the provider about any medical conditions you have or that run in your family. Include any recent stressors you have had. You may also need any of the following:    A neurologic exam is used to check how your pupils react to light. Your healthcare provider may check your memory, hand grasp, and balance.    CT or MRI pictures may be taken of your brain. You may be given contrast liquid to help your brain show up better in the pictures. Tell the healthcare provider if you have ever had an allergic reaction to contrast liquid. Do not enter the MRI room with anything metal. Metal can cause serious injury. Tell the healthcare provider if you have any metal in or on your body.  How is a migraine headache treated? Migraines cannot be cured. The goal of treatment is to reduce your symptoms.    Medicines may be given to help you manage migraines. Take medicine as soon as you feel a migraine begin, or as directed. Your healthcare provider may recommend any of the following:  Migraine medicines are used to help prevent or stop a migraine.    NSAIDs help decrease swelling and pain or fever. This medicine is available with or without a doctor's order. NSAIDs can cause stomach bleeding or kidney problems in certain people. If you take blood thinner medicine, always ask your healthcare provider if NSAIDs are safe for you. Always read the medicine label and follow directions.    Acetaminophen decreases pain and fever. It is available without a doctor's order. Ask how much to take and how often to take it. Follow directions. Read the labels of all other medicines you are using to see if they also contain acetaminophen, or ask your doctor or pharmacist. Acetaminophen can cause liver damage if not taken correctly.    Prescription pain medicine may be given. Ask your healthcare provider how to take this medicine safely. Some prescription pain medicines contain acetaminophen. Do not take other medicines that contain acetaminophen without talking to your healthcare provider. Too much acetaminophen may cause liver damage. Prescription pain medicine may cause constipation. Ask your healthcare provider how to prevent or treat constipation.    Antinausea medicine may be given to calm your stomach and to help prevent vomiting. This medicine can also help relieve pain.    Cognitive behavior therapy (CBT) can help you learn ways to manage and prevent migraines. A therapist can teach you to relax and to reduce stress. You may learn ways to create healthy nutrition, activity, and sleep habits to prevent migraines. The therapist can also help you manage conditions that can affect migraines, such as anxiety or depression.  What can I do to manage my symptoms?    Rest in a dark, quiet room. This will help decrease your pain. Sleep may also help relieve the pain.    Apply ice to decrease pain. Use an ice pack, or put crushed ice in a plastic bag. Cover the ice pack with a towel and place it on your head. Apply ice for 15 to 20 minutes every hour.    Apply heat to decrease pain and muscle spasms. Use a small towel dampened with warm water or a heating pad, or sit in a warm bath. Apply heat on the area for 20 to 30 minutes every 2 hours. You may alternate heat and ice.    Keep a migraine record. Write down when your migraines start and stop. Include your symptoms and what you were doing when a migraine began. Record what you ate or drank for 24 hours before the migraine started. Keep track of what you did to treat your migraine and if it worked. Bring the migraine record with you to visits with your healthcare provider.  What can I do to prevent another migraine headache?    Prevent a medicine overuse headache. Take pain medicines only as long as directed. A medicine may be limited to a certain amount each month. Your healthcare provider can help you create a plan so you get a safe amount each month.    Do not smoke. Nicotine and other chemicals in cigarettes and cigars can trigger a migraine or make it worse. Ask your healthcare provider for information if you currently smoke and need help to quit. E-cigarettes or smokeless tobacco still contain nicotine. Talk to your healthcare provider before you use these products.    Do not drink alcohol. Alcohol can trigger a migraine. It can also keep medicines used to treat your migraines from working.    Be physically active. Physical activity, such as exercise, may help prevent migraines. Talk to your healthcare provider about the best activity plan for you. Try to get at least 30 minutes of physical activity on most days.      Manage stress. Stress may trigger a migraine. Learn new ways to relax, such as deep breathing.    Create a sleep schedule. Go to bed and get up at the same times each day. Do not watch television before bed.    Eat a variety of healthy foods. Include healthy foods such as include fruit, vegetables, whole-grain breads, low-fat dairy products, beans, lean meat, and fish. Do not have food or drinks that trigger your migraines.  Healthy Foods      Drink more liquids to prevent dehydration. Your healthcare provider can tell you how much liquid to drink each day and which liquids are best for you.  Call your local emergency number (911 in the [place]) or have someone call if:    You feel like you are going to faint, you become confused, or you have a seizure.    When should I seek immediate care?    You have a headache that seems different or much worse than your usual migraine headache.    You have a severe headache with a fever or a stiff neck.    You have new problems with speech, vision, balance, or movement.  When should I call my doctor?    Your migraines interfere with your daily activities.    Your medicines or treatments stop working.    You have questions or concerns about your condition or care.  CARE AGREEMENT:    You have the right to help plan your care. Learn about your health condition and how it may be treated. Discuss treatment options with your healthcare providers to decide what care you want to receive. You always have the right to refuse treatment.

## 2024-11-17 NOTE — ED PROVIDER NOTE - OBJECTIVE STATEMENT
19-year-old female history of anxiety, ADHD, ASD, migraines, complaining of pain to right side of head behind eye that then developed into more of a headache typical of her migraines.  Has been taking sumatriptan and Tylenol with good relief.  Frequent ED visits and negative CT scans for same.  Has a neurologist that she follows up with, last visit within the past few weeks.Currently on Levaquin for URI

## 2024-11-17 NOTE — ED ADULT TRIAGE NOTE - CHIEF COMPLAINT QUOTE
I got a sharp pain behind my right eye today lasting about 45 minutes about 2 hours ago; and I have a headache; this happened 2 days ago but subsided

## 2024-11-17 NOTE — ED ADULT NURSE NOTE - PRO INTERPRETER NEED 2
patient brought in by mother states that since Tuesday has had neck pain, seen by UC not getting better English

## 2024-11-17 NOTE — ED ADULT NURSE NOTE - OBJECTIVE STATEMENT
pt to ED with c/o pain behind her eye; started 1 hour ago; no visual changes; also c/o nasal drip pt to ED with c/o pain behind her eye; started 1 hour ago; no visual changes; also c/o a "drip"

## 2024-11-17 NOTE — ED PROVIDER NOTE - CLINICAL SUMMARY MEDICAL DECISION MAKING FREE TEXT BOX
Patient presents with complaints of right eye pain for a few days it is now gone away.  Patient complains of some headache.  Has a history of migraines.  Also noted to have frequent ER visits with complaints related to her head or generalized constitutional symptoms.  Patient is presently on Levaquin for presumed sinus infection.  Patient has normal neuroexam.  Normal eye exam as well.  Possible  ocular migraine.  Patient can follow-up with her neurologist

## 2024-11-24 ENCOUNTER — EMERGENCY (EMERGENCY)
Facility: HOSPITAL | Age: 19
LOS: 1 days | Discharge: HOME | End: 2024-11-24
Attending: EMERGENCY MEDICINE | Admitting: EMERGENCY MEDICINE
Payer: MEDICAID

## 2024-11-24 VITALS
DIASTOLIC BLOOD PRESSURE: 82 MMHG | TEMPERATURE: 98 F | HEART RATE: 78 BPM | RESPIRATION RATE: 18 BRPM | OXYGEN SATURATION: 99 % | SYSTOLIC BLOOD PRESSURE: 131 MMHG

## 2024-11-24 VITALS
DIASTOLIC BLOOD PRESSURE: 84 MMHG | TEMPERATURE: 98 F | OXYGEN SATURATION: 96 % | HEART RATE: 77 BPM | WEIGHT: 136.69 LBS | RESPIRATION RATE: 20 BRPM | SYSTOLIC BLOOD PRESSURE: 135 MMHG | HEIGHT: 63 IN

## 2024-11-24 DIAGNOSIS — Z78.9 OTHER SPECIFIED HEALTH STATUS: Chronic | ICD-10-CM

## 2024-11-24 PROCEDURE — 99284 EMERGENCY DEPT VISIT MOD MDM: CPT

## 2024-11-24 PROCEDURE — 99282 EMERGENCY DEPT VISIT SF MDM: CPT

## 2024-11-24 RX ORDER — MECLIZINE HCL 25 MG
12.5 TABLET ORAL ONCE
Refills: 0 | Status: COMPLETED | OUTPATIENT
Start: 2024-11-24 | End: 2024-11-24

## 2024-11-24 RX ORDER — MECLIZINE HCL 25 MG
1 TABLET ORAL
Qty: 9 | Refills: 0
Start: 2024-11-24 | End: 2024-11-26

## 2024-11-24 RX ADMIN — Medication 12.5 MILLIGRAM(S): at 22:15

## 2024-11-24 NOTE — ED ADULT NURSE NOTE - NSFALLUNIVINTERV_ED_ALL_ED
Bed/Stretcher in lowest position, wheels locked, appropriate side rails in place/Call bell, personal items and telephone in reach/Instruct patient to call for assistance before getting out of bed/chair/stretcher/Non-slip footwear applied when patient is off stretcher/[place] to call system/Physically safe environment - no spills, clutter or unnecessary equipment/Purposeful proactive rounding/Room/bathroom lighting operational, light cord in reach

## 2024-11-24 NOTE — ED ADULT NURSE NOTE - PRO INTERPRETER NEED 2
Len called in and asked if Dr. Diaz could write him a new prescription for his handicap placard. Please advise patient when letter can be picked up   English
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
## 2024-11-24 NOTE — ED PROVIDER NOTE - NSFOLLOWUPINSTRUCTIONS_ED_ALL_ED_FT
FOLLOW UP WITH YOUR NEW PSYCHIATRIST AS SCHEDULED.    RETURN FOR URGENT CONCERNS.    ----------------------------------        Anxiety    AMBULATORY CARE:    Anxiety is a condition that causes you to feel extremely worried or nervous. The feelings are so strong that they can cause problems with your daily activities or sleep. Anxiety may be triggered by something you fear, or it may happen without a cause. Family or work stress, smoking, caffeine, and alcohol can increase your risk for anxiety. Certain medicines or health conditions can also increase your risk. Anxiety can become a long-term condition if it is not managed or treated.    Common signs and symptoms:    Fatigue or muscle tightness    Shaking, restlessness, or irritability    Problems focusing    Trouble sleeping    Feeling jumpy, easily startled, or dizzy    Rapid heartbeat or shortness of breath  Call your local emergency number (911 in the ) if:    You have chest pain, tightness, or heaviness that may spread to your shoulders, arms, jaw, neck, or back.    You think about harming yourself or someone else.  Call your therapist or doctor if:    Your symptoms get worse or do not get better with treatment.    Your anxiety keeps you from doing your regular daily activities.    You have new symptoms since your last visit.    You have questions or concerns about your condition or care.  The following resources are available at any time to help you, if needed:    Contact a suicide prevention organization:  For the [facility] Suicide and Crisis Lifeline:  Call or text [facility]    Send a chat on https://[employer].org/chat    Call 5-568-806-0454 (1-800-273-TALK)    For the Suicide Hotline, call 1-192.847.5573 (7-236-JUYDCBE)    For a list of international numbers: https://save.org/find-help/international-resources/  Treatment for anxiety depends on how severe your symptoms are. The following are common treatments for anxiety:    Cognitive behavioral therapy (CBT) teaches you how to identify and change negative thought patterns.    Anxiety or antidepressant medicine may help relieve or prevent anxiety. You may need to take the medicine for several weeks before you begin to feel better. Tell your healthcare provider about any side effects or problems you have with your medicine. The type or amount of medicine may need to be changed. Medicines are usually used along with therapy.  Self-care:    Talk to someone about your anxiety. Your healthcare provider may suggest counseling. You might feel more comfortable talking with a friend or family member about your anxiety. Choose someone you know will be supportive and encouraging.    Get regular physical activity. Physical activity can lower your stress, improve your mood, and help you sleep better. Work with your healthcare provider to develop a plan that you enjoy.   FAMILY WALKING FOR EXERCISE      Create a regular sleep schedule. A routine can help you relax before bed. Listen to music, read, or do yoga. Try to go to bed and wake up at the same time every day. Sleep is important for emotional health.    Find ways to relax. Activities such as meditation or listening to music can help you relax. Spend time with friends, or do things you enjoy.    Do activities you enjoy. Spend time with friends, or do something fun. Choose activities you are familiar with or comfortable doing. This may help prevent anxiety.    Practice deep breathing. Deep breathing can help you relax when you feel anxious. Focus on taking slow, deep breaths several times a day, or during an anxiety attack. Breathe in through your nose and out through your mouth. Deep breathing combined with meditation or listening to music may help you feel calmer.    Do not have caffeine. Caffeine can make your symptoms worse. Do not have foods or drinks that are meant to increase your energy level.    Do not drink alcohol or use drugs. Alcohol and drugs can worsen anxiety or make it hard to manage. Talk to your therapist or healthcare provider if you need help to quit.  Wellness Tips  Follow up with your therapist or doctor as directed: Your healthcare provider will monitor your progress at follow-up visits. Your provider will also monitor your medicine if you take antidepressants and ask if the medicine is helping. Tell your provider about any side effects or problems you have with your medicine. The type or amount of medicine may need to be changed. Write down your questions so you remember to ask them during your visits.    For more information or support:    [employer]  [address]  [place], [place]  Phone: 1-750.221.5205  Phone: 1-625.850.6936  Web Address: http://www.[employer].org  [address] Suicide and Crisis Lifeline  [address]  [place], [place]  Phone: 4-804-498  Web Address: http://www.suicidepreventionlifeline.org OR https://[employer].org/chat/

## 2024-11-24 NOTE — ED ADULT NURSE NOTE - GLASGOW COMA SCALE: EYE OPENING
no confusion/no dizziness/no fever/no loss of consciousness/no nausea/no numbness/no weakness/no change in level of consciousness/no vomiting (E4) spontaneous
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
## 2024-11-24 NOTE — ED PROVIDER NOTE - PATIENT PORTAL LINK FT
You can access the FollowMyHealth Patient Portal offered by [facility] by registering at the following website: [email or web address]. By joining [employer]’s FollowMyHealth portal, you will also be able to view your health information using other applications (apps) compatible with our system.

## 2024-11-25 ENCOUNTER — NON-APPOINTMENT (OUTPATIENT)
Age: 19
End: 2024-11-25

## 2024-11-28 ENCOUNTER — EMERGENCY (EMERGENCY)
Facility: HOSPITAL | Age: 19
LOS: 1 days | Discharge: HOME | End: 2024-11-28
Attending: EMERGENCY MEDICINE | Admitting: EMERGENCY MEDICINE
Payer: MEDICAID

## 2024-11-28 VITALS
TEMPERATURE: 98 F | RESPIRATION RATE: 15 BRPM | SYSTOLIC BLOOD PRESSURE: 130 MMHG | OXYGEN SATURATION: 99 % | DIASTOLIC BLOOD PRESSURE: 75 MMHG | HEART RATE: 80 BPM

## 2024-11-28 VITALS
HEART RATE: 79 BPM | WEIGHT: 130.07 LBS | DIASTOLIC BLOOD PRESSURE: 78 MMHG | RESPIRATION RATE: 15 BRPM | HEIGHT: 63 IN | OXYGEN SATURATION: 99 % | SYSTOLIC BLOOD PRESSURE: 125 MMHG | TEMPERATURE: 98 F

## 2024-11-28 DIAGNOSIS — Z78.9 OTHER SPECIFIED HEALTH STATUS: Chronic | ICD-10-CM

## 2024-11-28 PROCEDURE — 72040 X-RAY EXAM NECK SPINE 2-3 VW: CPT

## 2024-11-28 PROCEDURE — 99284 EMERGENCY DEPT VISIT MOD MDM: CPT

## 2024-11-28 PROCEDURE — 72040 X-RAY EXAM NECK SPINE 2-3 VW: CPT | Mod: 26

## 2024-11-28 PROCEDURE — 99283 EMERGENCY DEPT VISIT LOW MDM: CPT

## 2024-11-28 RX ORDER — ACETAMINOPHEN 500 MG/5ML
975 LIQUID (ML) ORAL ONCE
Refills: 0 | Status: COMPLETED | OUTPATIENT
Start: 2024-11-28 | End: 2024-11-28

## 2024-11-28 RX ADMIN — Medication 975 MILLIGRAM(S): at 11:47

## 2024-12-02 ENCOUNTER — EMERGENCY (EMERGENCY)
Facility: HOSPITAL | Age: 19
LOS: 1 days | Discharge: HOME | End: 2024-12-02
Attending: EMERGENCY MEDICINE | Admitting: EMERGENCY MEDICINE
Payer: MEDICAID

## 2024-12-02 VITALS
OXYGEN SATURATION: 100 % | RESPIRATION RATE: 19 BRPM | WEIGHT: 138.45 LBS | TEMPERATURE: 98 F | DIASTOLIC BLOOD PRESSURE: 87 MMHG | HEART RATE: 89 BPM | HEIGHT: 63 IN | SYSTOLIC BLOOD PRESSURE: 133 MMHG

## 2024-12-02 PROCEDURE — 99284 EMERGENCY DEPT VISIT MOD MDM: CPT | Mod: 25

## 2024-12-02 PROCEDURE — 99282 EMERGENCY DEPT VISIT SF MDM: CPT

## 2024-12-02 RX ORDER — MECLIZINE HCL 12.5 MG
25 TABLET ORAL ONCE
Refills: 0 | Status: COMPLETED | OUTPATIENT
Start: 2024-12-02 | End: 2024-12-02

## 2024-12-02 RX ADMIN — Medication 25 MILLIGRAM(S): at 02:01
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
## 2024-12-02 NOTE — ED PROVIDER NOTE - CLINICAL SUMMARY MEDICAL DECISION MAKING FREE TEXT BOX
Patient with claim of multiple minor head injuries today.  Patient is a frequent emergency department visitor, often with complaints of head injury.  Patient is requesting CAT scan but informed that there is no clinical indication for 1.  Will refer to concussion center should symptoms persist.
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
## 2024-12-02 NOTE — ED PROVIDER NOTE - PATIENT PORTAL LINK FT
You can access the FollowMyHealth Patient Portal offered by [facility] by registering at the following website: [email or web address]. By joining [employer]’s FollowMyHealth portal, you will also be able to view your health information using other applications (apps) compatible with our system.
Calm
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
## 2024-12-02 NOTE — ED ADULT NURSE NOTE - BREATHING
spontaneous Nsaids Counseling: NSAID Counseling: I discussed with the patient that NSAIDs should be taken with food. Prolonged use of NSAIDs can result in the development of stomach ulcers.  Patient advised to stop taking NSAIDs if abdominal pain occurs.  The patient verbalized understanding of the proper use and possible adverse effects of NSAIDs.  All of the patient's questions and concerns were addressed.

## 2024-12-04 ENCOUNTER — EMERGENCY (EMERGENCY)
Facility: HOSPITAL | Age: 19
LOS: 1 days | Discharge: HOME | End: 2024-12-04
Attending: EMERGENCY MEDICINE | Admitting: EMERGENCY MEDICINE
Payer: MEDICAID

## 2024-12-04 VITALS
HEART RATE: 88 BPM | OXYGEN SATURATION: 100 % | DIASTOLIC BLOOD PRESSURE: 86 MMHG | RESPIRATION RATE: 14 BRPM | SYSTOLIC BLOOD PRESSURE: 124 MMHG

## 2024-12-04 VITALS
RESPIRATION RATE: 16 BRPM | DIASTOLIC BLOOD PRESSURE: 76 MMHG | TEMPERATURE: 98 F | SYSTOLIC BLOOD PRESSURE: 120 MMHG | HEART RATE: 87 BPM | OXYGEN SATURATION: 100 % | WEIGHT: 130.07 LBS | HEIGHT: 63 IN

## 2024-12-04 DIAGNOSIS — Z78.9 OTHER SPECIFIED HEALTH STATUS: Chronic | ICD-10-CM

## 2024-12-04 LAB — OB PNL STL: NEGATIVE — SIGNIFICANT CHANGE UP

## 2024-12-04 PROCEDURE — 82272 OCCULT BLD FECES 1-3 TESTS: CPT

## 2024-12-04 PROCEDURE — 99283 EMERGENCY DEPT VISIT LOW MDM: CPT

## 2024-12-06 ENCOUNTER — EMERGENCY (EMERGENCY)
Facility: HOSPITAL | Age: 19
LOS: 1 days | Discharge: HOME | End: 2024-12-06
Attending: EMERGENCY MEDICINE | Admitting: EMERGENCY MEDICINE
Payer: MEDICAID

## 2024-12-06 VITALS
DIASTOLIC BLOOD PRESSURE: 73 MMHG | TEMPERATURE: 98 F | HEART RATE: 88 BPM | SYSTOLIC BLOOD PRESSURE: 118 MMHG | RESPIRATION RATE: 18 BRPM | HEIGHT: 63 IN | WEIGHT: 128.97 LBS | OXYGEN SATURATION: 100 %

## 2024-12-06 DIAGNOSIS — Z78.9 OTHER SPECIFIED HEALTH STATUS: Chronic | ICD-10-CM

## 2024-12-06 PROCEDURE — 99282 EMERGENCY DEPT VISIT SF MDM: CPT

## 2024-12-06 PROCEDURE — 99283 EMERGENCY DEPT VISIT LOW MDM: CPT | Mod: 25

## 2024-12-06 NOTE — ED ADULT NURSE NOTE - OBJECTIVE STATEMENT
Pt presents to the ED with reports of  "zapping" in her brain, pt also states that she had palpitations. Pt denies pain at present, has been seen numerous times in this ED over 2 months, most recently seen yesterday for c/o chest pain. Pt questioned regarding an ulterior reason for coming to the ED so often, denies any trouble at home, lives with her father, denies anyone harming her, denies wanting to hurt herself. Pt is now awake and alert, states she is seeing a new therapist but has not started new medication yet. Pt is breathing comfortably, denies any chest pain. is anxious, cooperative. Pt is concerned she has an aneurysm.

## 2024-12-06 NOTE — ED ADULT TRIAGE NOTE - CHIEF COMPLAINT QUOTE
c/o "zapping feeling in  my head" for a few days. Also c/o chest pain since last night. Taking Tylenol.

## 2024-12-06 NOTE — ED PROVIDER NOTE - PATIENT PORTAL LINK FT
You can access the FollowMyHealth Patient Portal offered by [facility] by registering at the following website: [email or web address]. By joining [email or web address]’s FollowMyHealth portal, you will also be able to view your health information using other applications (apps) compatible with our system.

## 2024-12-06 NOTE — ED PROVIDER NOTE - CLINICAL SUMMARY MEDICAL DECISION MAKING FREE TEXT BOX
patient presents with feelings Of zapping in her head and some palpitations.  Patient has presented with similar symptoms times in the past.  Patient appears in her usual state.  No intervention is needed at this time.  Patient can follow-up with her PCP.

## 2024-12-06 NOTE — ED PROVIDER NOTE - OBJECTIVE STATEMENT
Patient presents to emergency department with complaints of zapping in her head and palpitations.  Patient is well-known to the ED with frequent ED visits for various complaints.  Last seen yesterday.  Patient has a history of anxiety, ADHD, and is on the autism spectrum.  Patient is seeing a therapist.  Patient has presented with the same symptoms in the past.  Has had multiple CAT scans.

## 2024-12-06 NOTE — ED ADULT NURSE NOTE - NSICDXPASTMEDICALHX_GEN_ALL_CORE_FT
PAST MEDICAL HISTORY:  Anxiety     Attention deficit hyperactivity disorder     Autism spectrum disorder     Malnutrition     Migraine     Vertigo

## 2024-12-06 NOTE — ED ADULT NURSE NOTE - NSFALLUNIVINTERV_ED_ALL_ED
Bed/Stretcher in lowest position, wheels locked, appropriate side rails in place/Call bell, personal items and telephone in reach/Instruct patient to call for assistance before getting out of bed/chair/stretcher/Non-slip footwear applied when patient is off stretcher/[place] to call system/Physically safe environment - no spills, clutter or unnecessary equipment/Purposeful proactive rounding/Room/bathroom lighting operational, light cord in reach

## 2024-12-11 ENCOUNTER — EMERGENCY (EMERGENCY)
Facility: HOSPITAL | Age: 19
LOS: 1 days | Discharge: HOME | End: 2024-12-11
Attending: EMERGENCY MEDICINE | Admitting: EMERGENCY MEDICINE
Payer: MEDICAID

## 2024-12-11 DIAGNOSIS — Z78.9 OTHER SPECIFIED HEALTH STATUS: Chronic | ICD-10-CM

## 2024-12-11 PROCEDURE — 99284 EMERGENCY DEPT VISIT MOD MDM: CPT | Mod: 25

## 2024-12-12 VITALS
DIASTOLIC BLOOD PRESSURE: 76 MMHG | HEART RATE: 82 BPM | HEIGHT: 63 IN | RESPIRATION RATE: 20 BRPM | OXYGEN SATURATION: 100 % | SYSTOLIC BLOOD PRESSURE: 118 MMHG | TEMPERATURE: 98 F | WEIGHT: 139.11 LBS

## 2024-12-12 VITALS
DIASTOLIC BLOOD PRESSURE: 68 MMHG | TEMPERATURE: 99 F | HEART RATE: 90 BPM | SYSTOLIC BLOOD PRESSURE: 106 MMHG | RESPIRATION RATE: 18 BRPM | OXYGEN SATURATION: 99 %

## 2024-12-12 PROBLEM — R42 DIZZINESS AND GIDDINESS: Chronic | Status: ACTIVE | Noted: 2024-12-06

## 2024-12-12 LAB
ANION GAP SERPL CALC-SCNC: 13 MMOL/L — SIGNIFICANT CHANGE UP (ref 5–17)
APPEARANCE UR: CLEAR — SIGNIFICANT CHANGE UP
APTT BLD: 31.8 SEC — SIGNIFICANT CHANGE UP (ref 24.5–35.6)
BASOPHILS # BLD AUTO: 0.06 K/UL — SIGNIFICANT CHANGE UP (ref 0–0.2)
BASOPHILS NFR BLD AUTO: 0.7 % — SIGNIFICANT CHANGE UP (ref 0–2)
BILIRUB UR-MCNC: NEGATIVE — SIGNIFICANT CHANGE UP
BUN SERPL-MCNC: 5 MG/DL — LOW (ref 7–23)
CALCIUM SERPL-MCNC: 9.5 MG/DL — SIGNIFICANT CHANGE UP (ref 8.4–10.5)
CHLORIDE SERPL-SCNC: 103 MMOL/L — SIGNIFICANT CHANGE UP (ref 96–108)
CO2 SERPL-SCNC: 21 MMOL/L — LOW (ref 22–31)
COLOR SPEC: YELLOW — SIGNIFICANT CHANGE UP
CREAT SERPL-MCNC: 0.63 MG/DL — SIGNIFICANT CHANGE UP (ref 0.5–1.3)
DIFF PNL FLD: NEGATIVE — SIGNIFICANT CHANGE UP
EGFR: 129 ML/MIN/1.73M2 — SIGNIFICANT CHANGE UP
EGFR: 129 ML/MIN/1.73M2 — SIGNIFICANT CHANGE UP
EOSINOPHIL # BLD AUTO: 0.18 K/UL — SIGNIFICANT CHANGE UP (ref 0–0.5)
EOSINOPHIL NFR BLD AUTO: 2 % — SIGNIFICANT CHANGE UP (ref 0–6)
GLUCOSE SERPL-MCNC: 82 MG/DL — SIGNIFICANT CHANGE UP (ref 70–99)
GLUCOSE UR QL: NEGATIVE MG/DL — SIGNIFICANT CHANGE UP
HCG UR QL: NEGATIVE — SIGNIFICANT CHANGE UP
HCT VFR BLD CALC: 37 % — SIGNIFICANT CHANGE UP (ref 34.5–45)
HGB BLD-MCNC: 12 G/DL — SIGNIFICANT CHANGE UP (ref 11.5–15.5)
IMM GRANULOCYTES NFR BLD AUTO: 0.1 % — SIGNIFICANT CHANGE UP (ref 0–0.9)
INR BLD: 1.08 RATIO — SIGNIFICANT CHANGE UP (ref 0.85–1.16)
KETONES UR-MCNC: NEGATIVE MG/DL — SIGNIFICANT CHANGE UP
LEUKOCYTE ESTERASE UR-ACNC: NEGATIVE — SIGNIFICANT CHANGE UP
LYMPHOCYTES # BLD AUTO: 3.02 K/UL — SIGNIFICANT CHANGE UP (ref 1–3.3)
LYMPHOCYTES # BLD AUTO: 33.5 % — SIGNIFICANT CHANGE UP (ref 13–44)
MCHC RBC-ENTMCNC: 28 PG — SIGNIFICANT CHANGE UP (ref 27–34)
MCHC RBC-ENTMCNC: 32.4 G/DL — SIGNIFICANT CHANGE UP (ref 32–36)
MCV RBC AUTO: 86.2 FL — SIGNIFICANT CHANGE UP (ref 80–100)
MONOCYTES # BLD AUTO: 0.78 K/UL — SIGNIFICANT CHANGE UP (ref 0–0.9)
MONOCYTES NFR BLD AUTO: 8.7 % — SIGNIFICANT CHANGE UP (ref 2–14)
NEUTROPHILS # BLD AUTO: 4.96 K/UL — SIGNIFICANT CHANGE UP (ref 1.8–7.4)
NEUTROPHILS NFR BLD AUTO: 55 % — SIGNIFICANT CHANGE UP (ref 43–77)
NITRITE UR-MCNC: NEGATIVE — SIGNIFICANT CHANGE UP
NRBC # BLD: 0 /100 WBCS — SIGNIFICANT CHANGE UP (ref 0–0)
NRBC BLD-RTO: 0 /100 WBCS — SIGNIFICANT CHANGE UP (ref 0–0)
PH UR: 6.5 — SIGNIFICANT CHANGE UP (ref 5–8)
PLATELET # BLD AUTO: 323 K/UL — SIGNIFICANT CHANGE UP (ref 150–400)
POTASSIUM SERPL-MCNC: 3.7 MMOL/L — SIGNIFICANT CHANGE UP (ref 3.5–5.3)
POTASSIUM SERPL-SCNC: 3.7 MMOL/L — SIGNIFICANT CHANGE UP (ref 3.5–5.3)
PROT UR-MCNC: NEGATIVE MG/DL — SIGNIFICANT CHANGE UP
PROTHROM AB SERPL-ACNC: 12.7 SEC — SIGNIFICANT CHANGE UP (ref 9.9–13.4)
RBC # BLD: 4.29 M/UL — SIGNIFICANT CHANGE UP (ref 3.8–5.2)
RBC # FLD: 12.7 % — SIGNIFICANT CHANGE UP (ref 10.3–14.5)
SODIUM SERPL-SCNC: 137 MMOL/L — SIGNIFICANT CHANGE UP (ref 135–145)
SP GR SPEC: 1 — LOW (ref 1–1.03)
UROBILINOGEN FLD QL: 0.2 MG/DL — SIGNIFICANT CHANGE UP (ref 0.2–1)
WBC # BLD: 9.01 K/UL — SIGNIFICANT CHANGE UP (ref 3.8–10.5)
WBC # FLD AUTO: 9.01 K/UL — SIGNIFICANT CHANGE UP (ref 3.8–10.5)

## 2024-12-12 PROCEDURE — 76830 TRANSVAGINAL US NON-OB: CPT | Mod: 26

## 2024-12-12 PROCEDURE — 81025 URINE PREGNANCY TEST: CPT

## 2024-12-12 PROCEDURE — 85025 COMPLETE CBC W/AUTO DIFF WBC: CPT

## 2024-12-12 PROCEDURE — 80048 BASIC METABOLIC PNL TOTAL CA: CPT

## 2024-12-12 PROCEDURE — 85730 THROMBOPLASTIN TIME PARTIAL: CPT

## 2024-12-12 PROCEDURE — 85610 PROTHROMBIN TIME: CPT

## 2024-12-12 PROCEDURE — 36415 COLL VENOUS BLD VENIPUNCTURE: CPT

## 2024-12-12 PROCEDURE — 76830 TRANSVAGINAL US NON-OB: CPT

## 2024-12-12 PROCEDURE — 81003 URINALYSIS AUTO W/O SCOPE: CPT

## 2024-12-12 PROCEDURE — 99284 EMERGENCY DEPT VISIT MOD MDM: CPT | Mod: 25

## 2024-12-12 RX ORDER — ONDANSETRON HCL/PF 4 MG/2 ML
4 VIAL (ML) INJECTION ONCE
Refills: 0 | Status: DISCONTINUED | OUTPATIENT
Start: 2024-12-12 | End: 2024-12-12

## 2024-12-13 ENCOUNTER — EMERGENCY (EMERGENCY)
Facility: HOSPITAL | Age: 19
LOS: 1 days | Discharge: HOME | End: 2024-12-13
Attending: EMERGENCY MEDICINE | Admitting: EMERGENCY MEDICINE
Payer: MEDICAID

## 2024-12-13 VITALS
RESPIRATION RATE: 18 BRPM | HEART RATE: 84 BPM | WEIGHT: 125 LBS | OXYGEN SATURATION: 100 % | HEIGHT: 63 IN | DIASTOLIC BLOOD PRESSURE: 73 MMHG | SYSTOLIC BLOOD PRESSURE: 110 MMHG | TEMPERATURE: 98 F

## 2024-12-13 DIAGNOSIS — Z78.9 OTHER SPECIFIED HEALTH STATUS: Chronic | ICD-10-CM

## 2024-12-13 PROCEDURE — 99282 EMERGENCY DEPT VISIT SF MDM: CPT

## 2024-12-13 PROCEDURE — 99284 EMERGENCY DEPT VISIT MOD MDM: CPT | Mod: 25

## 2024-12-13 RX ORDER — MECLIZINE HCL 12.5 MG
1 TABLET ORAL
Qty: 10 | Refills: 0
Start: 2024-12-13

## 2024-12-13 RX ORDER — MECLIZINE HCL 12.5 MG
12.5 TABLET ORAL ONCE
Refills: 0 | Status: COMPLETED | OUTPATIENT
Start: 2024-12-13 | End: 2024-12-13

## 2024-12-13 RX ADMIN — Medication 12.5 MILLIGRAM(S): at 03:16

## 2024-12-13 NOTE — ED PROVIDER NOTE - PATIENT PORTAL LINK FT
You can access the FollowMyHealth Patient Portal offered by [facility] by registering at the following website: [email or web address]. By joining [employer]’s FollowMyHealth portal, you will also be able to view your health information using other applications (apps) compatible with our system.

## 2024-12-13 NOTE — ED ADULT NURSE NOTE - DISCHARGE DATE/TIME
Pain    • #Acceptable pain level achieved/maintained at rest using NRS/Faces Outcome Met, Complete Goal    • # Acceptable pain level achieved/maintained with activity using NRS/Faces Outcome Met, Complete Goal           13-Dec-2024 03:20

## 2024-12-13 NOTE — ED ADULT NURSE NOTE - OBJECTIVE STATEMENT
Ambulatory to ER w/ c/o numbness in my head and nausea for days. Ambulatory to ER w/ c/o "numbness in my head, dizziness and nausea for days".

## 2024-12-13 NOTE — ED ADULT NURSE NOTE - NSFALLUNIVINTERV_ED_ALL_ED
Bed/Stretcher in lowest position, wheels locked, appropriate side rails in place/Call bell, personal items and telephone in reach/Instruct patient to call for assistance before getting out of bed/chair/stretcher/Non-slip footwear applied when patient is off stretcher/[place] to call system/Physically safe environment - no spills, clutter or unnecessary equipment/Purposeful proactive rounding/Room/bathroom lighting operational, light cord in reach

## 2024-12-13 NOTE — ED PROVIDER NOTE - CPE EDP HEAD NORM PED
"Chief Complaint  Follow-up of the Left Hip    Subjective          Leonie Bates is a 39 y.o. female  presents to [facility] for   History of Present Illness    Patient presents for follow-up evaluation of low back strain, lumbar DJD.  Patient was last seen by Dr. Llanes he determined her issues were in her low back, patient was advised to start physical therapy which she states she has been doing she was given tramadol and a Medrol Dosepak which she states helped her back pain calm down she was on some work restrictions.  Patient states her pain decreased with the treatments she was given she states that her back pain is more localized now she had a flareup several weeks ago which she called our office and Dr. Llanes ordered an MRI of her low back, this was reviewed with her today.  Patient describes her pain now is a constant ache with occasional flareups of more severe pain.  No Known Allergies     Social History     Socioeconomic History   • Marital status:    Tobacco Use   • Smoking status: Never Smoker   • Smokeless tobacco: Never Used   Vaping Use   • Vaping Use: Never used   Substance and Sexual Activity   • Alcohol use: Never   • Drug use: Never   • Sexual activity: Not Currently        REVIEW OF SYSTEMS    Constitutional: Denies fevers, chills, weight loss  Cardiovascular: Denies chest pain, shortness of breath  Skin: Denies rashes, acute skin changes  Neurologic: Denies headache, loss of consciousness  MSK: Low back pain      Objective   Vital Signs:   Pulse 93   Ht 162.6 cm (64\")   Wt 117 kg (258 lb 6.4 oz)   SpO2 97%   BMI 44.35 kg/m²     Body mass index is 44.35 kg/m².    Physical Exam    Left hip/low back: non-tender. No midline tenderness. Pain over posterior hip but non-tender on exam. Pain with straight leg raise. Pain with hip flexion, flexion 95. External Rotation 55. Internal rotation 35. Full extension. Negative xu. Neurovascularly intact. "     Procedures    Imaging Results (Most Recent)     None       MRI Lumbar Spine Without Contrast    Result Date: 10/15/2021  Narrative: PROCEDURE: MRI LUMBAR SPINE WO CONTRAST  COMPARISON: [facility], CT, ABDOMEN/PELVIS WITH CONTRAST, 8/29/2018, 9:29.  [facility] , CR, XR HIP W OR WO PELVIS 2-3 VIEW LEFT, 9/17/2021, 8:21.  INDICATIONS: lumbar sprain  TECHNIQUE: Multiplanar multisequence images of the lumbar spine without contrast.  FINDINGS:  No evidence of acute marrow edema or fracture.  No paraspinal masses are identified.  The abdominal aorta has a normal caliber.  The conus medullaris has a normal appearance ending at the L1-L2 level.  L1-L2:  Normal.  L2-L3:  Normal.  L3-L4:  Normal.  L4-L5:  Disc desiccation is present.  There is bilateral facet OA.  There is a minimal diffuse disc bulge.  Mild central canal stenosis and mild bilateral foraminal stenosis are present.  L5-S1:  L5 pars defects are present with grade 1 anterolisthesis.  There is bilateral facet OA.  There is a diffuse disc bulge.  There is mild central canal stenosis and moderate bilateral foraminal stenosis.  IMPRESSION:  L5 pars defects with grade 1 anterolisthesis at L5-S1.  There is mild central canal stenosis and moderate bilateral foraminal stenosis at this level.   SRUTHI DANG MD       Electronically Signed and Approved By: SRUTHI DANG MD on 10/15/2021 at 15:39             Result Review :   The following data was reviewed by: MILIND Atkinson on 10/18/2021:  Data reviewed: Radiologic studies Reviewed by me with the patient             Assessment and Plan    Diagnoses and all orders for this visit:    1. Left hip pain (Primary)    2. Strain of lumbar region, initial encounter  -     Ambulatory Referral to Neurosurgery    3. Other osteoarthritis of spine, lumbar region  -     Ambulatory Referral to Neurosurgery        Discussed diagnosis and treatment options with the patient, reviewed her  MRI with her, patient was advised we will give her referral to Dr. Mckeon for further evaluation, follow-up as needed with our office.    Call or return if worsening symptoms.    Follow Up   Return if symptoms worsen or fail to improve.  Patient was given instructions and counseling regarding her condition or for health maintenance advice. Please see specific information pulled into the AVS if appropriate.        comprehensive exam...

## 2024-12-13 NOTE — ED PROVIDER NOTE - OBJECTIVE STATEMENT
20 yo F with frequent ER visits, last visit was last night, is here today c/o dizziness that she has had for the past 6 months for which she takes meclizine for and having trouble thinking. No trauma or injury. Pt has had 3 CT heads since Sept 2024. Pt also reports she has followed up with a neurologist.     I inquired with patient if she is having any social issues. She denies. States she lives with her father.

## 2024-12-13 NOTE — ED PROVIDER NOTE - CLINICAL SUMMARY MEDICAL DECISION MAKING FREE TEXT BOX
20 yo F with chronic complaints of dizziness and difficulty with thinking.   Pt requesting a dose of meclizine in ED

## 2024-12-19 ENCOUNTER — EMERGENCY (EMERGENCY)
Facility: HOSPITAL | Age: 19
LOS: 1 days | Discharge: HOME | End: 2024-12-19
Attending: EMERGENCY MEDICINE | Admitting: EMERGENCY MEDICINE
Payer: MEDICAID

## 2024-12-19 VITALS
TEMPERATURE: 98 F | HEART RATE: 94 BPM | DIASTOLIC BLOOD PRESSURE: 82 MMHG | OXYGEN SATURATION: 100 % | SYSTOLIC BLOOD PRESSURE: 130 MMHG | RESPIRATION RATE: 14 BRPM

## 2024-12-19 VITALS
HEIGHT: 63 IN | DIASTOLIC BLOOD PRESSURE: 82 MMHG | WEIGHT: 130.07 LBS | TEMPERATURE: 99 F | HEART RATE: 87 BPM | SYSTOLIC BLOOD PRESSURE: 126 MMHG | OXYGEN SATURATION: 100 % | RESPIRATION RATE: 14 BRPM

## 2024-12-19 DIAGNOSIS — Z78.9 OTHER SPECIFIED HEALTH STATUS: Chronic | ICD-10-CM

## 2024-12-19 LAB
ALBUMIN SERPL ELPH-MCNC: 4.2 G/DL — SIGNIFICANT CHANGE UP (ref 3.3–5)
ALP SERPL-CCNC: 52 U/L — SIGNIFICANT CHANGE UP (ref 30–120)
ALT FLD-CCNC: 26 U/L — SIGNIFICANT CHANGE UP (ref 10–60)
ANION GAP SERPL CALC-SCNC: 11 MMOL/L — SIGNIFICANT CHANGE UP (ref 5–17)
APPEARANCE UR: CLEAR — SIGNIFICANT CHANGE UP
AST SERPL-CCNC: 18 U/L — SIGNIFICANT CHANGE UP (ref 10–40)
BACTERIA # UR AUTO: ABNORMAL /HPF
BASOPHILS # BLD AUTO: 0.05 K/UL — SIGNIFICANT CHANGE UP (ref 0–0.2)
BASOPHILS NFR BLD AUTO: 0.5 % — SIGNIFICANT CHANGE UP (ref 0–2)
BILIRUB SERPL-MCNC: 0.7 MG/DL — SIGNIFICANT CHANGE UP (ref 0.2–1.2)
BILIRUB UR-MCNC: NEGATIVE — SIGNIFICANT CHANGE UP
BUN SERPL-MCNC: 5 MG/DL — LOW (ref 7–23)
CALCIUM SERPL-MCNC: 9.4 MG/DL — SIGNIFICANT CHANGE UP (ref 8.4–10.5)
CHLORIDE SERPL-SCNC: 104 MMOL/L — SIGNIFICANT CHANGE UP (ref 96–108)
CO2 SERPL-SCNC: 25 MMOL/L — SIGNIFICANT CHANGE UP (ref 22–31)
COLOR SPEC: YELLOW — SIGNIFICANT CHANGE UP
CREAT SERPL-MCNC: 0.82 MG/DL — SIGNIFICANT CHANGE UP (ref 0.5–1.3)
DIFF PNL FLD: ABNORMAL
EGFR: 104 ML/MIN/1.73M2 — SIGNIFICANT CHANGE UP
EOSINOPHIL # BLD AUTO: 0.03 K/UL — SIGNIFICANT CHANGE UP (ref 0–0.5)
EOSINOPHIL NFR BLD AUTO: 0.3 % — SIGNIFICANT CHANGE UP (ref 0–6)
EPI CELLS # UR: PRESENT
GLUCOSE SERPL-MCNC: 103 MG/DL — HIGH (ref 70–99)
GLUCOSE UR QL: NEGATIVE MG/DL — SIGNIFICANT CHANGE UP
HCG UR QL: NEGATIVE — SIGNIFICANT CHANGE UP
HCT VFR BLD CALC: 38 % — SIGNIFICANT CHANGE UP (ref 34.5–45)
HGB BLD-MCNC: 12.4 G/DL — SIGNIFICANT CHANGE UP (ref 11.5–15.5)
IMM GRANULOCYTES NFR BLD AUTO: 0.4 % — SIGNIFICANT CHANGE UP (ref 0–0.9)
KETONES UR-MCNC: NEGATIVE MG/DL — SIGNIFICANT CHANGE UP
LEUKOCYTE ESTERASE UR-ACNC: ABNORMAL
LYMPHOCYTES # BLD AUTO: 1.84 K/UL — SIGNIFICANT CHANGE UP (ref 1–3.3)
LYMPHOCYTES # BLD AUTO: 18.1 % — SIGNIFICANT CHANGE UP (ref 13–44)
MCHC RBC-ENTMCNC: 28.4 PG — SIGNIFICANT CHANGE UP (ref 27–34)
MCHC RBC-ENTMCNC: 32.6 G/DL — SIGNIFICANT CHANGE UP (ref 32–36)
MCV RBC AUTO: 87 FL — SIGNIFICANT CHANGE UP (ref 80–100)
MONOCYTES # BLD AUTO: 0.61 K/UL — SIGNIFICANT CHANGE UP (ref 0–0.9)
MONOCYTES NFR BLD AUTO: 6 % — SIGNIFICANT CHANGE UP (ref 2–14)
NEUTROPHILS # BLD AUTO: 7.61 K/UL — HIGH (ref 1.8–7.4)
NEUTROPHILS NFR BLD AUTO: 74.7 % — SIGNIFICANT CHANGE UP (ref 43–77)
NITRITE UR-MCNC: NEGATIVE — SIGNIFICANT CHANGE UP
NRBC # BLD: 0 /100 WBCS — SIGNIFICANT CHANGE UP (ref 0–0)
PH UR: 6.5 — SIGNIFICANT CHANGE UP (ref 5–8)
PLATELET # BLD AUTO: 327 K/UL — SIGNIFICANT CHANGE UP (ref 150–400)
POTASSIUM SERPL-MCNC: 3.5 MMOL/L — SIGNIFICANT CHANGE UP (ref 3.5–5.3)
POTASSIUM SERPL-SCNC: 3.5 MMOL/L — SIGNIFICANT CHANGE UP (ref 3.5–5.3)
PROT SERPL-MCNC: 7.6 G/DL — SIGNIFICANT CHANGE UP (ref 6–8.3)
PROT UR-MCNC: NEGATIVE MG/DL — SIGNIFICANT CHANGE UP
RBC # BLD: 4.37 M/UL — SIGNIFICANT CHANGE UP (ref 3.8–5.2)
RBC # FLD: 13 % — SIGNIFICANT CHANGE UP (ref 10.3–14.5)
RBC CASTS # UR COMP ASSIST: 4 /HPF — SIGNIFICANT CHANGE UP (ref 0–4)
SODIUM SERPL-SCNC: 140 MMOL/L — SIGNIFICANT CHANGE UP (ref 135–145)
SP GR SPEC: 1 — LOW (ref 1–1.03)
UROBILINOGEN FLD QL: 0.2 MG/DL — SIGNIFICANT CHANGE UP (ref 0.2–1)
WBC # BLD: 10.18 K/UL — SIGNIFICANT CHANGE UP (ref 3.8–10.5)
WBC # FLD AUTO: 10.18 K/UL — SIGNIFICANT CHANGE UP (ref 3.8–10.5)
WBC UR QL: 10 /HPF — HIGH (ref 0–5)

## 2024-12-19 PROCEDURE — 87086 URINE CULTURE/COLONY COUNT: CPT

## 2024-12-19 PROCEDURE — 36415 COLL VENOUS BLD VENIPUNCTURE: CPT

## 2024-12-19 PROCEDURE — 93005 ELECTROCARDIOGRAM TRACING: CPT

## 2024-12-19 PROCEDURE — 99283 EMERGENCY DEPT VISIT LOW MDM: CPT | Mod: 25

## 2024-12-19 PROCEDURE — 85025 COMPLETE CBC W/AUTO DIFF WBC: CPT

## 2024-12-19 PROCEDURE — 81001 URINALYSIS AUTO W/SCOPE: CPT

## 2024-12-19 PROCEDURE — 80053 COMPREHEN METABOLIC PANEL: CPT

## 2024-12-19 PROCEDURE — 99284 EMERGENCY DEPT VISIT MOD MDM: CPT

## 2024-12-19 PROCEDURE — 81025 URINE PREGNANCY TEST: CPT

## 2024-12-19 PROCEDURE — 93010 ELECTROCARDIOGRAM REPORT: CPT

## 2024-12-19 RX ORDER — ACETAMINOPHEN 500MG 500 MG/1
650 TABLET, COATED ORAL ONCE
Refills: 0 | Status: COMPLETED | OUTPATIENT
Start: 2024-12-19 | End: 2024-12-19

## 2024-12-19 RX ORDER — MECLIZINE HCL 12.5 MG
25 TABLET ORAL ONCE
Refills: 0 | Status: COMPLETED | OUTPATIENT
Start: 2024-12-19 | End: 2024-12-19

## 2024-12-19 RX ORDER — AZITHROMYCIN 250 MG
0 CAPSULE ORAL
Refills: 0 | DISCHARGE

## 2024-12-19 RX ORDER — SODIUM CHLORIDE 9 MG/ML
1000 INJECTION, SOLUTION INTRAMUSCULAR; INTRAVENOUS; SUBCUTANEOUS ONCE
Refills: 0 | Status: COMPLETED | OUTPATIENT
Start: 2024-12-19 | End: 2024-12-19

## 2024-12-19 RX ADMIN — SODIUM CHLORIDE 1000 MILLILITER(S): 9 INJECTION, SOLUTION INTRAMUSCULAR; INTRAVENOUS; SUBCUTANEOUS at 15:25

## 2024-12-19 RX ADMIN — ACETAMINOPHEN 500MG 650 MILLIGRAM(S): 500 TABLET, COATED ORAL at 16:26

## 2024-12-19 RX ADMIN — Medication 25 MILLIGRAM(S): at 16:28

## 2024-12-19 NOTE — ED PROVIDER NOTE - PROGRESS NOTE DETAILS
Reevaluated patient at bedside.  Patient feeling improved.  Discussed the results of all diagnostic testing in ED and copies of all reports given.   An opportunity to ask questions was given.  Discussed the importance of prompt, close medical follow-up.  Patient will return with any changes, concerns or persistent / worsening symptoms.  Understanding of all instructions verbalized. Reevaluated patient at bedside.  Patient feeling improved, wants to be d/c home to f/u with her PMD and her neurologist as outpt.  Discussed the results of all diagnostic testing in ED and copies of all reports given.   An opportunity to ask questions was given.  Discussed the importance of prompt, close medical follow-up.  Patient will return with any changes, concerns or persistent / worsening symptoms.  Understanding of all instructions verbalized.

## 2024-12-19 NOTE — ED PROVIDER NOTE - OBJECTIVE STATEMENT
Patient complaining of dizziness since awoke this morning.  Patient reports she frequently gets similar symptoms sometimes take Antivert which helps.  Patient's relates frequent ER visits and multiple CTs of her head.  No LOC nausea vomiting focal weakness numbness chest pain abdominal pain.  PMD Rubens

## 2024-12-19 NOTE — ED PROVIDER NOTE - DIFFERENTIAL DIAGNOSIS
Differential including but not limited to electrolyte abnormality dehydration Differential Diagnosis

## 2024-12-19 NOTE — ED ADULT NURSE REASSESSMENT NOTE - NS ED NURSE REASSESS COMMENT FT1
dizziness status quo. pt re evaluated by md and to be d'c/d  iv d'c/d  no s/s infiltration upon removal  pt discharged stable and ambulatory in nad at present d/c instruction reinforced and pt verbalized understanding vital signs as charted

## 2024-12-19 NOTE — ED ADULT NURSE NOTE - COUGH
Implemented All Universal Safety Interventions:  [place] to call system. Call bell, personal items and telephone within reach. Instruct patient to call for assistance. Room bathroom lighting operational. Non-slip footwear when patient is off stretcher. Physically safe environment: no spills, clutter or unnecessary equipment. Stretcher in lowest position, wheels locked, appropriate side rails in place.
none

## 2024-12-19 NOTE — ED PROVIDER NOTE - CLINICAL SUMMARY MEDICAL DECISION MAKING FREE TEXT BOX
Patient complaining of dizziness since awoke this morning.  Patient reports she frequently gets similar symptoms sometimes take Antivert which helps.  Patient's relates frequent ER visits and multiple CTs of her head.  No LOC nausea vomiting focal weakness numbness chest pain abdominal pain.  PMD Art    Plan EKG labs orthostatics IV fluids meclizine    Differential including but not limited to electrolyte abnormality dehydration

## 2024-12-19 NOTE — ED PROVIDER NOTE - PATIENT PORTAL LINK FT
You can access the FollowMyHealth Patient Portal offered by [facility] by registering at the following website: [email or web address]. By joining [employer]’s FollowMyHealth portal, you will also be able to view your health information using other applications (apps) compatible with our system.

## 2024-12-19 NOTE — ED ADULT NURSE NOTE - NS ED NURSE LEVEL OF CONSCIOUSNESS SPEECH
Patient: Maya Fields    Procedure Summary     Date: 21 Room / Location: [facility] LABOR DELIVERY 2 / [facility] LABOR DELIVERY    Anesthesia Start:  Anesthesia Stop:     Procedure:  SECTION PRIMARY (N/A Abdomen) Diagnosis:       Normal labor      (Normal labor [O80, Z37.9])    Surgeons: Krista Wayne DO Provider: Rajat Briggs CRNA    Anesthesia Type: spinal ASA Status: 2          Anesthesia Type: spinal    Vitals  Vitals Value Taken Time   /90 21 0800   Temp 97.4 °F (36.3 °C) 21 0800   Pulse 104 21 0800   Resp 16 21 0800   SpO2 100 % 21 0800           Post Anesthesia Care and Evaluation    Patient location during evaluation: floor  Patient participation: complete - patient participated  Level of consciousness: awake  Pain score: 1  Pain management: adequate  Airway patency: patent  Anesthetic complications: No anesthetic complications  PONV Status: none  Cardiovascular status: acceptable  Hydration status: acceptable      
Speaking Coherently

## 2024-12-21 ENCOUNTER — EMERGENCY (EMERGENCY)
Facility: HOSPITAL | Age: 19
LOS: 1 days | Discharge: HOME | End: 2024-12-21
Attending: EMERGENCY MEDICINE | Admitting: EMERGENCY MEDICINE
Payer: MEDICAID

## 2024-12-21 VITALS
TEMPERATURE: 99 F | DIASTOLIC BLOOD PRESSURE: 74 MMHG | HEART RATE: 98 BPM | WEIGHT: 130.07 LBS | SYSTOLIC BLOOD PRESSURE: 120 MMHG | RESPIRATION RATE: 16 BRPM | HEIGHT: 63 IN | OXYGEN SATURATION: 100 %

## 2024-12-21 VITALS
HEART RATE: 87 BPM | OXYGEN SATURATION: 100 % | DIASTOLIC BLOOD PRESSURE: 87 MMHG | SYSTOLIC BLOOD PRESSURE: 129 MMHG | RESPIRATION RATE: 18 BRPM | TEMPERATURE: 98 F

## 2024-12-21 DIAGNOSIS — Z78.9 OTHER SPECIFIED HEALTH STATUS: Chronic | ICD-10-CM

## 2024-12-21 LAB
ALBUMIN SERPL ELPH-MCNC: 3.8 G/DL — SIGNIFICANT CHANGE UP (ref 3.3–5)
ALP SERPL-CCNC: 52 U/L — SIGNIFICANT CHANGE UP (ref 30–120)
ALT FLD-CCNC: 21 U/L — SIGNIFICANT CHANGE UP (ref 10–60)
ANION GAP SERPL CALC-SCNC: 10 MMOL/L — SIGNIFICANT CHANGE UP (ref 5–17)
APPEARANCE UR: CLEAR — SIGNIFICANT CHANGE UP
AST SERPL-CCNC: 16 U/L — SIGNIFICANT CHANGE UP (ref 10–40)
BASOPHILS # BLD AUTO: 0.07 K/UL — SIGNIFICANT CHANGE UP (ref 0–0.2)
BASOPHILS NFR BLD AUTO: 1.1 % — SIGNIFICANT CHANGE UP (ref 0–2)
BILIRUB SERPL-MCNC: 0.3 MG/DL — SIGNIFICANT CHANGE UP (ref 0.2–1.2)
BILIRUB UR-MCNC: NEGATIVE — SIGNIFICANT CHANGE UP
BUN SERPL-MCNC: 9 MG/DL — SIGNIFICANT CHANGE UP (ref 7–23)
CALCIUM SERPL-MCNC: 9.3 MG/DL — SIGNIFICANT CHANGE UP (ref 8.4–10.5)
CHLORIDE SERPL-SCNC: 105 MMOL/L — SIGNIFICANT CHANGE UP (ref 96–108)
CO2 SERPL-SCNC: 25 MMOL/L — SIGNIFICANT CHANGE UP (ref 22–31)
COLOR SPEC: YELLOW — SIGNIFICANT CHANGE UP
CREAT SERPL-MCNC: 0.73 MG/DL — SIGNIFICANT CHANGE UP (ref 0.5–1.3)
CULTURE RESULTS: SIGNIFICANT CHANGE UP
DIFF PNL FLD: ABNORMAL
EGFR: 120 ML/MIN/1.73M2 — SIGNIFICANT CHANGE UP
EOSINOPHIL # BLD AUTO: 0.06 K/UL — SIGNIFICANT CHANGE UP (ref 0–0.5)
EOSINOPHIL NFR BLD AUTO: 1 % — SIGNIFICANT CHANGE UP (ref 0–6)
GLUCOSE SERPL-MCNC: 98 MG/DL — SIGNIFICANT CHANGE UP (ref 70–99)
GLUCOSE UR QL: NEGATIVE MG/DL — SIGNIFICANT CHANGE UP
HCG SERPL-ACNC: <1 MIU/ML — SIGNIFICANT CHANGE UP
HCT VFR BLD CALC: 36 % — SIGNIFICANT CHANGE UP (ref 34.5–45)
HGB BLD-MCNC: 11.8 G/DL — SIGNIFICANT CHANGE UP (ref 11.5–15.5)
IMM GRANULOCYTES NFR BLD AUTO: 0.2 % — SIGNIFICANT CHANGE UP (ref 0–0.9)
KETONES UR-MCNC: NEGATIVE MG/DL — SIGNIFICANT CHANGE UP
LEUKOCYTE ESTERASE UR-ACNC: NEGATIVE — SIGNIFICANT CHANGE UP
LIDOCAIN IGE QN: 36 U/L — SIGNIFICANT CHANGE UP (ref 16–77)
LYMPHOCYTES # BLD AUTO: 2.11 K/UL — SIGNIFICANT CHANGE UP (ref 1–3.3)
LYMPHOCYTES # BLD AUTO: 34.2 % — SIGNIFICANT CHANGE UP (ref 13–44)
MCHC RBC-ENTMCNC: 28.6 PG — SIGNIFICANT CHANGE UP (ref 27–34)
MCHC RBC-ENTMCNC: 32.8 G/DL — SIGNIFICANT CHANGE UP (ref 32–36)
MCV RBC AUTO: 87.2 FL — SIGNIFICANT CHANGE UP (ref 80–100)
MONOCYTES # BLD AUTO: 0.52 K/UL — SIGNIFICANT CHANGE UP (ref 0–0.9)
MONOCYTES NFR BLD AUTO: 8.4 % — SIGNIFICANT CHANGE UP (ref 2–14)
NEUTROPHILS # BLD AUTO: 3.4 K/UL — SIGNIFICANT CHANGE UP (ref 1.8–7.4)
NEUTROPHILS NFR BLD AUTO: 55.1 % — SIGNIFICANT CHANGE UP (ref 43–77)
NITRITE UR-MCNC: NEGATIVE — SIGNIFICANT CHANGE UP
NRBC # BLD: 0 /100 WBCS — SIGNIFICANT CHANGE UP (ref 0–0)
PH UR: 7.5 — SIGNIFICANT CHANGE UP (ref 5–8)
PLATELET # BLD AUTO: 315 K/UL — SIGNIFICANT CHANGE UP (ref 150–400)
POTASSIUM SERPL-MCNC: 3.7 MMOL/L — SIGNIFICANT CHANGE UP (ref 3.5–5.3)
POTASSIUM SERPL-SCNC: 3.7 MMOL/L — SIGNIFICANT CHANGE UP (ref 3.5–5.3)
PROT SERPL-MCNC: 7.3 G/DL — SIGNIFICANT CHANGE UP (ref 6–8.3)
PROT UR-MCNC: NEGATIVE MG/DL — SIGNIFICANT CHANGE UP
RBC # BLD: 4.13 M/UL — SIGNIFICANT CHANGE UP (ref 3.8–5.2)
RBC # FLD: 12.8 % — SIGNIFICANT CHANGE UP (ref 10.3–14.5)
SODIUM SERPL-SCNC: 140 MMOL/L — SIGNIFICANT CHANGE UP (ref 135–145)
SP GR SPEC: 1.01 — SIGNIFICANT CHANGE UP (ref 1–1.03)
SPECIMEN SOURCE: SIGNIFICANT CHANGE UP
UROBILINOGEN FLD QL: 0.2 MG/DL — SIGNIFICANT CHANGE UP (ref 0.2–1)
WBC # BLD: 6.17 K/UL — SIGNIFICANT CHANGE UP (ref 3.8–10.5)
WBC # FLD AUTO: 6.17 K/UL — SIGNIFICANT CHANGE UP (ref 3.8–10.5)

## 2024-12-21 PROCEDURE — 96374 THER/PROPH/DIAG INJ IV PUSH: CPT

## 2024-12-21 PROCEDURE — 80053 COMPREHEN METABOLIC PANEL: CPT

## 2024-12-21 PROCEDURE — 36415 COLL VENOUS BLD VENIPUNCTURE: CPT

## 2024-12-21 PROCEDURE — 81001 URINALYSIS AUTO W/SCOPE: CPT

## 2024-12-21 PROCEDURE — 83690 ASSAY OF LIPASE: CPT

## 2024-12-21 PROCEDURE — 99284 EMERGENCY DEPT VISIT MOD MDM: CPT

## 2024-12-21 PROCEDURE — 84702 CHORIONIC GONADOTROPIN TEST: CPT

## 2024-12-21 PROCEDURE — 85025 COMPLETE CBC W/AUTO DIFF WBC: CPT

## 2024-12-21 PROCEDURE — 99284 EMERGENCY DEPT VISIT MOD MDM: CPT | Mod: 25

## 2024-12-21 RX ORDER — SODIUM CHLORIDE 9 MG/ML
1000 INJECTION, SOLUTION INTRAMUSCULAR; INTRAVENOUS; SUBCUTANEOUS ONCE
Refills: 0 | Status: COMPLETED | OUTPATIENT
Start: 2024-12-21 | End: 2024-12-21

## 2024-12-21 RX ORDER — PANTOPRAZOLE SODIUM 40 MG/1
1 TABLET, DELAYED RELEASE ORAL
Qty: 14 | Refills: 0
Start: 2024-12-21

## 2024-12-21 RX ORDER — PANTOPRAZOLE SODIUM 40 MG/1
40 TABLET, DELAYED RELEASE ORAL ONCE
Refills: 0 | Status: COMPLETED | OUTPATIENT
Start: 2024-12-21 | End: 2024-12-21

## 2024-12-21 RX ADMIN — SODIUM CHLORIDE 1000 MILLILITER(S): 9 INJECTION, SOLUTION INTRAMUSCULAR; INTRAVENOUS; SUBCUTANEOUS at 17:59

## 2024-12-21 RX ADMIN — PANTOPRAZOLE SODIUM 40 MILLIGRAM(S): 40 TABLET, DELAYED RELEASE ORAL at 18:00

## 2024-12-21 NOTE — ED PROVIDER NOTE - NSFOLLOWUPINSTRUCTIONS_ED_ALL_ED_FT
1. Follow-up with your Primary Medical doctor. Call Monday for prompt follow-up.  2. Return to Emergency room for any worsening or persistent pain, weakness, fever, vomiting, diarrhea, unable to eat / drink, weak or dizzy, or any other concerning symptoms.  3. See attached instruction sheets for additional information, including information regarding signs and symptoms to look out for, reasons to seek immediate care and other important instructions.  4. Follow-up with gastroenterology, call Monday for prompt follow-up  5.  Protonix once daily for 14 days

## 2024-12-21 NOTE — ED PROVIDER NOTE - GASTROINTESTINAL, MLM
Abdomen soft, ?? minimal epigastric tenderness, No other acute abd tenderness. no rebound. no hsm.  no guarding.

## 2024-12-21 NOTE — ED PROVIDER NOTE - CARE PROVIDER_API CALL
Zack Jefferson  Gastroenterology  [address] [place] [id number]  Phone: [phone]  Fax: [phone]  Follow Up Time:

## 2024-12-21 NOTE — ED PROVIDER NOTE - ED STEMI HIDDEN
Need MD approval.  I don't believe FDA guidelines will authorize 90 day supply on controlled (hydrocodone), also I do not believe refills can be \"saved/on file.\"  MD to advise.      Hydrocodone LR: 02/08/2021 #30 (intended: 02/12/22 ?).    Tramadol LR: 12/02/2020 #180 (intended: 12/15/2020- 03/14/2021).     Lorazepam LR: 12/02/2020 #180 (intended: 12/15/2020- 03/14/2021).     -Spiriva -note on med list.  Renewed in another refill [facility]' received.     LOV: 12/02/2020  Pending f/u apt: 03/11/2021   hide

## 2024-12-21 NOTE — ED PROVIDER NOTE - PATIENT PORTAL LINK FT
You can access the FollowMyHealth Patient Portal offered by [facility] by registering at the following website: [email or web address]. By joining [employer]’s FollowMyHealth portal, you will also be able to view your health information using other applications (apps) compatible with our system.

## 2024-12-21 NOTE — ED PROVIDER NOTE - PROGRESS NOTE DETAILS
Reevaluated patient at bedside.  Patient feeling much improved.  . Discussed the results of all diagnostic testing in ED.  An opportunity to ask questions was given.  Discussed the nature of diagnostic testing in the abdomen and the importance of continued reevaluation.  Understanding of the potential risk of occult pathology was verbalized and the patient will continue to follow-up as an outpatient for further workup and evaluation until resolution.  Discussed the importance of prompt, close medical follow-up.  Patient will return with any changes, concerns or persistent / worsening symptoms.

## 2024-12-21 NOTE — ED PROVIDER NOTE - OBJECTIVE STATEMENT
19-year-old female with a history of anxiety, autism spectrum disorder, ADHD presents with complaint of some epigastric discomfort today.  Patient states that she has had some dark stools.  No fever or chills.  No nausea or vomiting.  No acute diarrhea.  No recent trauma.  The patient has had around 30 ED visits over the past 4 months, provides complaints including abdominal pain, dizziness.  The patient had extensive workup and multiple CAT scans for the same symptoms.  No recent trauma.  No aggravating or alleviating factors otherwise noted.  No other acute complaints.

## 2024-12-21 NOTE — ED POST DISCHARGE NOTE - DETAILS
Patient called to inquire about her culture urine culture results.  The culture was negative, I discussed with the patient.  She will follow-up as outpatient.

## 2024-12-28 ENCOUNTER — EMERGENCY (EMERGENCY)
Facility: HOSPITAL | Age: 19
LOS: 1 days | Discharge: HOME | End: 2024-12-28
Attending: EMERGENCY MEDICINE | Admitting: EMERGENCY MEDICINE
Payer: MEDICAID

## 2024-12-28 VITALS
SYSTOLIC BLOOD PRESSURE: 123 MMHG | OXYGEN SATURATION: 100 % | HEART RATE: 99 BPM | DIASTOLIC BLOOD PRESSURE: 76 MMHG | TEMPERATURE: 98 F | RESPIRATION RATE: 15 BRPM

## 2024-12-28 VITALS
SYSTOLIC BLOOD PRESSURE: 128 MMHG | TEMPERATURE: 98 F | WEIGHT: 139.55 LBS | OXYGEN SATURATION: 100 % | HEART RATE: 98 BPM | HEIGHT: 63 IN | RESPIRATION RATE: 16 BRPM | DIASTOLIC BLOOD PRESSURE: 73 MMHG

## 2024-12-28 DIAGNOSIS — Z78.9 OTHER SPECIFIED HEALTH STATUS: Chronic | ICD-10-CM

## 2024-12-28 PROCEDURE — 99282 EMERGENCY DEPT VISIT SF MDM: CPT

## 2024-12-28 PROCEDURE — 99283 EMERGENCY DEPT VISIT LOW MDM: CPT

## 2024-12-28 RX ORDER — ACETAMINOPHEN 500MG 500 MG/1
650 TABLET, COATED ORAL ONCE
Refills: 0 | Status: COMPLETED | OUTPATIENT
Start: 2024-12-28 | End: 2024-12-28

## 2024-12-28 RX ADMIN — ACETAMINOPHEN 500MG 650 MILLIGRAM(S): 500 TABLET, COATED ORAL at 17:48

## 2024-12-28 NOTE — ED ADULT NURSE NOTE - BREATHING
spontaneous Advancement Flap (Single) Text: The defect edges were debeveled with a #15 scalpel blade.  Given the location of the defect and the proximity to free margins a single advancement flap was deemed most appropriate.  Using a sterile surgical marker, an appropriate advancement flap was drawn incorporating the defect and placing the expected incisions within the relaxed skin tension lines where possible.    The area thus outlined was incised deep to adipose tissue with a #15 scalpel blade.  The skin margins were undermined to an appropriate distance in all directions utilizing iris scissors.

## 2024-12-28 NOTE — ED PROVIDER NOTE - CLINICAL SUMMARY MEDICAL DECISION MAKING FREE TEXT BOX
Patient is a 19-year-old female who presents to the emergency room with a chief complaint of shortness of breath.  Past medical history of vertigo migraines ADHD anxiety and autism.  Reports intermittent lightheadedness since last night with intermittent blurred vision.  Currently denies any lightheadedness or blurred vision.  She does endorse that she hit her head lightly at the gym yesterday antigen back but denies loss of consciousness is not on any anticoagulation.  She further notes some associated shortness of breath.  Denies any headache nausea vomiting diarrhea fevers chest pain or abdominal pain.  No active shortness of breath at this time.  Has had multiple ED visits for similar complaints with prior imaging all of which have been negative.  She states symptoms are the same that she has experienced previous.  On exam patient is sitting up no acute distress normocephalic atraumatic pupils equal round and reactive hearts regular rate lungs clear to auscultation abdomen soft nontender nondistended.  Neuroexam is nonfocal NIH stroke scale 0.  Patient ambulatory with steady gait.  Patient presenting to the emergency room with multiple medical complaints.  At this time exam is benign.  Has been worked up for similar complaints in the past extensively.  As patient had no active complaints at the present time with a stable exam will discharge with outpatient follow-up.

## 2024-12-28 NOTE — ED PROVIDER NOTE - DIFFERENTIAL DIAGNOSIS
Differentials include but not limited to medical screening exam, anxiety, vertigo Differential Diagnosis

## 2024-12-28 NOTE — ED PROVIDER NOTE - OBJECTIVE STATEMENT
19-year-old female with history of vertigo, migraines, ADHD, anxiety, and autism presents to emergency room with intermittent lightheadedness since last night and intermittent blurry vision.  Denies any current blurry vision.  Denies headache or one-sided weakness.  Denies nausea, vomiting, or diarrhea.  Intermittent shortness of breath.  No chest pain.  Patient does report she hit her head lightly at the gym yesterday on a gym bag.  No LOC.  Does not take any blood thinners.  Also states she hit her head a couple days ago but was a "very light hit".  Patient has had multiple visits for similar complaints.  Has had approximately 30 visits over the past 4 months for similar symptoms.  Has had multiple negative CAT scans of head.  States symptoms similar in quality and character as previous episodes of dizziness  LUIS EDUARDO Gaspar

## 2024-12-28 NOTE — ED PROVIDER NOTE - NSFOLLOWUPINSTRUCTIONS_ED_ALL_ED_FT
Drink plenty of fluids  Have close follow-up with your primary care doctor      Lightheadedness    WHAT YOU NEED TO KNOW:    Lightheadedness is the feeling that you may faint, but you do not. Your heartbeat may be fast or feel like it flutters. Lightheadedness may occur when you take certain medicines, such as medicine to lower your blood pressure. Dehydration, low sodium, low blood sugar, an abnormal heart rhythm, and anxiety are other common causes.    DISCHARGE INSTRUCTIONS:    Return to the emergency department if:    You have sudden chest pain.    You have trouble breathing or shortness of breath.    You have vision changes, are sweating, and have nausea while you are sitting or lying down.    You feel flushed and your heart is fluttering.    You faint.  Contact your healthcare provider if:    You feel lightheaded often.    Your heart beats faster or slower than usual.    You have questions or concerns about your condition or care.  Follow up with your healthcare provider as directed: You may need more tests to help find the cause of your lightheadedness. The tests will help healthcare providers plan the best treatment for you. Write down your questions so you remember to ask them during your visits.    Self-care: Talk with your healthcare provider about these and other ways to manage your symptoms:    Lie down when you feel lightheaded, your throat gets tight, or your vision changes. Raise your legs above the level of your heart.    Stand up slowly. Sit on the side of the bed or couch for a few minutes before you stand up.    Take slow, deep breaths when you feel lightheaded. This can help decrease the feeling that you might faint.    Ask if you need to avoid hot baths and saunas. These may make your symptoms worse.  Watch for signs of low blood sugar: These include hunger, nervousness, sweating, and fast or fluttery heartbeats. Talk with your healthcare provider about ways to keep your blood sugar level steady.    Check your blood pressure often: You should do this especially if you take medicine to lower your blood pressure. Check your blood pressure when you are lying down and when you are standing. Ask how often to check during the day. Keep a record of your blood pressure numbers. Your healthcare provider may use the record to help plan your treatment.    Keep a record of your lightheadedness episodes: Include your symptoms and your activity before and after the episode. The record can help your healthcare provider find the cause of your lightheadedness and help you manage episodes.

## 2024-12-28 NOTE — ED PROVIDER NOTE - CARE PROVIDER_API CALL
Chasity Art  Follow Up Time: 1-3 Days   Chasity Art  Follow Up Time: 1-3 Days    Darshan Silver  Neurology  [address] [place] [address]  Phone: [phone]  Fax: [phone]  Follow Up Time: 1-3 Days

## 2024-12-28 NOTE — ED PROVIDER NOTE - PROVIDER TOKENS
PROVIDER:[TOKEN:[[id number]],FOLLOWUP:[1-3 Days]] PROVIDER:[TOKEN:[[id number]],FOLLOWUP:[1-3 Days]],PROVIDER:[TOKEN:[[id number]],FOLLOWUP:[1-3 Days]]

## 2024-12-28 NOTE — ED PROVIDER NOTE - CARE PLAN
1 Principal Discharge DX:	Encounter for medical screening examination  Secondary Diagnosis:	Autism  Secondary Diagnosis:	Anxiety  Secondary Diagnosis:	History of dizziness

## 2024-12-28 NOTE — ED PROVIDER NOTE - PATIENT PORTAL LINK FT
You can access the FollowMyHealth Patient Portal offered by [facility] by registering at the following website: [email or web address]. By joining [employer]’s FollowMyHealth portal, you will also be able to view your health information using other applications (apps) compatible with our system.

## 2024-12-28 NOTE — ED PROVIDER NOTE - CARE PROVIDERS DIRECT ADDRESSES
,[email or web address]

## 2024-12-28 NOTE — ED PROVIDER NOTE - PROGRESS NOTE DETAILS
Patient stable.  Overall appears well.  States symptoms intermittent in nature.  Reported some blurred vision earlier.  No current blurred vision.  Visual acuity 20/20 bilaterally.  No neurodeficits.  Has had multiple CAT scans for dizziness which is unremarkable. States symptoms same quality and character.  Recommend follow-up with neurology if symptoms continue or fail to improve, referral provided if needed.

## 2024-12-30 ENCOUNTER — EMERGENCY (EMERGENCY)
Facility: HOSPITAL | Age: 19
LOS: 1 days | Discharge: HOME | End: 2024-12-30
Attending: EMERGENCY MEDICINE
Payer: MEDICAID

## 2024-12-30 VITALS
TEMPERATURE: 98 F | OXYGEN SATURATION: 98 % | DIASTOLIC BLOOD PRESSURE: 72 MMHG | WEIGHT: 139.77 LBS | HEIGHT: 63 IN | HEART RATE: 79 BPM | RESPIRATION RATE: 19 BRPM | SYSTOLIC BLOOD PRESSURE: 110 MMHG

## 2024-12-30 DIAGNOSIS — Z78.9 OTHER SPECIFIED HEALTH STATUS: Chronic | ICD-10-CM

## 2024-12-30 PROCEDURE — 99283 EMERGENCY DEPT VISIT LOW MDM: CPT | Mod: 25

## 2024-12-31 VITALS
DIASTOLIC BLOOD PRESSURE: 79 MMHG | HEART RATE: 91 BPM | RESPIRATION RATE: 18 BRPM | SYSTOLIC BLOOD PRESSURE: 124 MMHG | OXYGEN SATURATION: 100 % | TEMPERATURE: 98 F

## 2024-12-31 PROCEDURE — 99282 EMERGENCY DEPT VISIT SF MDM: CPT

## 2024-12-31 RX ORDER — ACETAMINOPHEN 500MG 500 MG/1
650 TABLET, COATED ORAL ONCE
Refills: 0 | Status: COMPLETED | OUTPATIENT
Start: 2024-12-31 | End: 2024-12-31

## 2024-12-31 RX ADMIN — ACETAMINOPHEN 500MG 650 MILLIGRAM(S): 500 TABLET, COATED ORAL at 04:04

## 2024-12-31 NOTE — ED PROVIDER NOTE - OBJECTIVE STATEMENT
19-year-old female with history of ADHD, autism spectrum disorder, anxiety, chronic vertigo on meclizine presenting with neck pain and concern that she has "blood clot to her neck".  Patient stated she was having neck pain and forceful forcefully cracked her neck.  Since then patient states she has been having dizziness described as vertigo.  Patient denies numbness, tingling, weakness.  She also denies headache or blurry vision. patient recently saw her neurologist and had a negative mri. she is currently on meclizine po prn
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
## 2024-12-31 NOTE — ED ADULT NURSE NOTE - NSFALLUNIVINTERV_ED_ALL_ED
Bed/Stretcher in lowest position, wheels locked, appropriate side rails in place/Call bell, personal items and telephone in reach/Instruct patient to call for assistance before getting out of bed/chair/stretcher/Non-slip footwear applied when patient is off stretcher/[place] to call system/Physically safe environment - no spills, clutter or unnecessary equipment/Purposeful proactive rounding/Room/bathroom lighting operational, light cord in reach

## 2024-12-31 NOTE — ED PROVIDER NOTE - NSFOLLOWUPCLINICS_GEN_ALL_ED_FT
[facility]  Neurology  [address] [place] [address] - [address] [place] [address]  Phone: [phone]  Fax:   Follow Up Time: 4-6 Days

## 2024-12-31 NOTE — ED PROVIDER NOTE - MUSCULOSKELETAL, MLM
no midline cervical, thoracic or lumbar tenderness, +minimal left paraspinal cervical tenderness. range of motion is not limited.

## 2024-12-31 NOTE — ED ADULT NURSE NOTE - NSFALLRISK_ED_ALL_ED
Problem: Dysphagia  Goal: # Exhibits no s/s of aspiration  Symptoms of aspiration include coughing, choking, throat clearing, change in voice quality, and/or a decrease in oxygen saturation by more than 2% points from baseline after swallowing.  Outcome: Outcome Met, Continue evaluating goal progress toward completion  Patient is NPO.    Problem: At Risk for Falls  Goal: # Takes action to control fall-related risks  Outcome: Outcome Met, Continue evaluating goal progress toward completion     09/28/19 0880   Lopez Fall Scale   Participates in Fall Prevention Activities Yes          No

## 2024-12-31 NOTE — ED PROVIDER NOTE - NEUROLOGICAL, MLM
Alert and oriented, no focal deficits, no motor or sensory deficits, gait is normal and patient is not vertiginous no

## 2024-12-31 NOTE — ED PROVIDER NOTE - NSFOLLOWUPINSTRUCTIONS_ED_ALL_ED_FT
ACUTE NECK PAIN - AfterCare(R) Instructions(ER/ED)    Acute Neck Pain    WHAT YOU NEED TO KNOW:    Acute neck pain starts suddenly, increases quickly, and goes away in a few days. The pain may come and go, or be worse with certain movements. The pain may be only in your neck, or it may move to your arms, back, or shoulders. You may also have pain that starts in another body area and moves to your neck.  Vertebral Column    DISCHARGE INSTRUCTIONS:    Return to the emergency department if:    You have an injury that causes neck pain and shooting pain down your arms or legs.    Your neck pain suddenly becomes severe.    You have neck pain along with numbness, tingling, or weakness in your arms or legs.    You have a stiff neck, a headache, and a fever.  Call your doctor if:    You have new or worsening symptoms.    Your symptoms continue even after treatment.    You have questions or concerns about your condition or care.  Medicines: You may need any of the following:    NSAIDs, such as ibuprofen, help decrease swelling, pain, and fever. This medicine is available with or without a doctor's order. NSAIDs can cause stomach bleeding or kidney problems in certain people. If you take blood thinner medicine, always ask your healthcare provider if NSAIDs are safe for you. Always read the medicine label and follow directions.    Acetaminophen decreases pain and fever. It is available without a doctor's order. Ask how much to take and how often to take it. Follow directions. Read the labels of all other medicines you are using to see if they also contain acetaminophen, or ask your doctor or pharmacist. Acetaminophen can cause liver damage if not taken correctly.    Steroid medicine may be used to reduce inflammation. This can help relieve pain caused by swelling.    Muscle relaxers help relax tense muscles and can prevent muscle spasms.    Nerve medicine may be given if your pain is caused by a nerve problem.    Take your medicine as directed. Contact your healthcare provider if you think your medicine is not helping or if you have side effects. Tell your provider if you are allergic to any medicine. Keep a list of the medicines, vitamins, and herbs you take. Include the amounts, and when and why you take them. Bring the list or the pill bottles to follow-up visits. Carry your medicine list with you in case of an emergency.  Manage or prevent acute neck pain:    Rest your neck as directed. Do not make sudden movements, such as turning your head quickly. Your healthcare provider may recommend you wear a cervical collar for a short time. The collar will prevent you from moving your head. This will give your neck time to heal if an injury is causing your neck pain. Ask your healthcare provider when you can return to sports or other normal daily activities.  Cervical Collars      Apply heat as directed. Heat helps relieve pain and swelling. Use a heat wrap, or soak a small towel in warm water. Wring out the extra water. Apply the heat wrap or towel for 20 minutes every hour, or as directed.    Apply ice as directed. Ice helps relieve pain and swelling, and can help prevent tissue damage. Use an ice pack, or put ice in a bag. Cover the ice pack or back with a towel before you apply it to your neck. Apply the ice pack or ice for 15 minutes every hour, or as directed. Your healthcare provider can tell you how often to apply ice.    Do neck exercises as directed. Neck exercises help strengthen the muscles and increase range of motion. Your healthcare provider will tell you which exercises are right for you. He or she may give you instructions or recommend that you work with a physical therapist. Your healthcare provider or therapist can make sure you are doing the exercises correctly.    Maintain good posture. Try to keep your head and shoulders lifted when you sit. If you work in front of a computer, make sure the monitor is at the right level. You should not need to look up down to see the screen. You should also not have to lean forward to be able to read what is on the screen. Make sure your keyboard, mouse, and other computer items are placed where you do not have to extend your shoulder to reach them. Get up often if you work in front of a computer or sit for long periods of time. Stretch or walk around to keep your neck muscles loose.  Proper Ergonomics  Follow up with your doctor as directed: He or she may refer you to a specialist if your pain does not get better with treatment. Write down your questions so you remember to ask them during your visits.

## 2024-12-31 NOTE — ED PROVIDER NOTE - PATIENT PORTAL LINK FT
You can access the FollowMyHealth Patient Portal offered by [facility] by registering at the following website: [email or web address]. By joining [employer]’s FollowMyHealth portal, you will also be able to view your health information using other applications (apps) compatible with our system.

## 2024-12-31 NOTE — ED PROVIDER NOTE - CLINICAL SUMMARY MEDICAL DECISION MAKING FREE TEXT BOX
19-year-old female with history of ADHD, autism spectrum disorder, anxiety, chronic vertigo on meclizine presenting with neck pain and concern that she has "blood clot to her neck".  Patient stated she was having neck pain and forceful forcefully cracked her neck.  Since then patient states she has been having dizziness described as vertigo.  Patient denies numbness, tingling, weakness.  She also denies headache or blurry vision. patient recently saw her neurologist and had a negative mri. she is currently on meclizine po prn 19-year-old female with history of ADHD, autism spectrum disorder, anxiety, chronic vertigo on meclizine presenting with neck pain and concern that she has "blood clot to her neck".  Patient stated she was having neck pain and forceful forcefully cracked her neck.  Since then patient states she has been having dizziness described as vertigo.  Patient denies numbness, tingling, weakness.  She also denies headache or blurry vision. patient recently saw her neurologist and had a negative mri. she is currently on meclizine po prn. exam significant for mild paraspinal left cervical tenderness and patient's exam is unremarkable. she has normal gait.  a/p: pt with mild muscle strain.  pain control.

## 2025-01-05 ENCOUNTER — EMERGENCY (EMERGENCY)
Facility: HOSPITAL | Age: 20
LOS: 1 days | Discharge: HOME | End: 2025-01-05
Attending: EMERGENCY MEDICINE | Admitting: EMERGENCY MEDICINE
Payer: SELF-PAY

## 2025-01-05 VITALS
RESPIRATION RATE: 16 BRPM | HEART RATE: 73 BPM | WEIGHT: 141.1 LBS | HEIGHT: 63 IN | TEMPERATURE: 98 F | SYSTOLIC BLOOD PRESSURE: 136 MMHG | DIASTOLIC BLOOD PRESSURE: 84 MMHG | OXYGEN SATURATION: 100 %

## 2025-01-05 VITALS — HEART RATE: 80 BPM | DIASTOLIC BLOOD PRESSURE: 77 MMHG | SYSTOLIC BLOOD PRESSURE: 121 MMHG

## 2025-01-05 DIAGNOSIS — Z78.9 OTHER SPECIFIED HEALTH STATUS: Chronic | ICD-10-CM

## 2025-01-05 LAB — HCG UR QL: NEGATIVE — SIGNIFICANT CHANGE UP

## 2025-01-05 PROCEDURE — 81025 URINE PREGNANCY TEST: CPT

## 2025-01-05 PROCEDURE — 99284 EMERGENCY DEPT VISIT MOD MDM: CPT

## 2025-01-05 PROCEDURE — 99283 EMERGENCY DEPT VISIT LOW MDM: CPT

## 2025-01-05 RX ORDER — ONDANSETRON 4 MG/1
4 TABLET ORAL ONCE
Refills: 0 | Status: COMPLETED | OUTPATIENT
Start: 2025-01-05 | End: 2025-01-05

## 2025-01-05 RX ADMIN — ONDANSETRON 4 MILLIGRAM(S): 4 TABLET ORAL at 01:44

## 2025-01-05 NOTE — ED PROVIDER NOTE - PATIENT PORTAL LINK FT
You can access the FollowMyHealth Patient Portal offered by [facility] by registering at the following website: [email or web address]. By joining [employer]’s FollowMyHealth portal, you will also be able to view your health information using other applications (apps) compatible with our system.

## 2025-01-05 NOTE — ED PROVIDER NOTE - OBJECTIVE STATEMENT
19y F c/o nausea, reports the nausea started after intercourse this evening, has some pelvic discomfort, but mostly nausea, has been tolerating PO, no vomiting, no fever, no chills, no diarrhea, pt also reports bumping her head on the wall, has frequent bouts of vertigo and has had some tonight, no LOC, occurred about 1hr ago, denies abuse or safety issues

## 2025-01-05 NOTE — ED ADULT NURSE NOTE - NSFALLUNIVINTERV_ED_ALL_ED
Bed/Stretcher in lowest position, wheels locked, appropriate side rails in place/Call bell, personal items and telephone in reach/Instruct patient to call for assistance before getting out of bed/chair/stretcher/Non-slip footwear applied when patient is off stretcher/[place] to call system/Physically safe environment - no spills, clutter or unnecessary equipment/Purposeful proactive rounding/Room/bathroom lighting operational, light cord in reach

## 2025-01-05 NOTE — ED PROVIDER NOTE - NSFOLLOWUPINSTRUCTIONS_ED_ALL_ED_FT
Nausea, Adult  Nausea is the feeling of having an upset stomach or that you are about to vomit. Nausea on its own is not usually a serious concern, but it may be an early sign of a more serious medical problem. As nausea gets worse, it can lead to vomiting. If vomiting develops, or if you are not able to drink enough fluids, you are at risk of becoming dehydrated.    Dehydration can make you tired and thirsty, cause you to have a dry mouth, and decrease how often you urinate. Older adults and people with other diseases or a weak disease-fighting system (immune system) are at higher risk for dehydration. The main goals of treating your nausea are:  To relieve your nausea.  To limit repeated nausea episodes.  To prevent vomiting and dehydration.  Follow these instructions at home:  Watch your symptoms for any changes. Tell your health care provider about them.    Eating and drinking    A bottle of clear fruit juice and glass of water.   A sign showing that a person should not drink alcohol.  Take an oral rehydration solution (ORS). This is a drink that is sold at pharmacies and retail stores.  Drink clear fluids slowly and in small amounts as you are able. Clear fluids include water, ice chips, low-calorie sports drinks, and fruit juice that has water added (diluted fruit juice).  Eat bland, easy-to-digest foods in small amounts as you are able. These foods include bananas, applesauce, rice, lean meats, toast, and crackers.  Avoid drinking fluids that contain a lot of sugar or caffeine, such as energy drinks, sports drinks, and soda.  Avoid alcohol.  Avoid spicy or fatty foods.  General instructions    Take over-the-counter and prescription medicines only as told by your health care provider.  Rest at home while you recover.  Drink enough fluid to keep your urine pale yellow.  Breathe slowly and deeply when you feel nauseous.  Avoid smelling things that have strong odors.  Wash your hands often using soap and water for at least 20 seconds. If soap and water are not available, use hand .  Make sure that everyone in your household washes their hands well and often.  Keep all follow-up visits. This is important.  Contact a health care provider if:  Your nausea gets worse.  Your nausea does not go away after two days.  You vomit multiple times.  You cannot drink fluids without vomiting.  You have any of the following:  New symptoms.  A fever.  A headache.  Muscle cramps.  A rash.  Pain while urinating.  You feel light-headed or dizzy.  Get help right away if:  You have pain in your chest, neck, arm, or jaw.  You feel extremely weak or you faint.  You have vomit that is bright red or looks like coffee grounds.  You have bloody or black stools (feces) or stools that look like tar.  You have a severe headache, a stiff neck, or both.  You have severe pain, cramping, or bloating in your abdomen.  You have difficulty breathing or are breathing very quickly.  Your heart is beating very quickly.  Your skin feels cold and clammy.  You feel confused.  You have signs of dehydration, such as:  Dark urine, very little urine, or no urine.  Cracked lips.  Dry mouth.  Sunken eyes.  Sleepiness.  Weakness.  These symptoms may be an emergency. Get help right away. Call 911.  Do not wait to see if the symptoms will go away.  Do not drive yourself to the hospital.  Summary  Nausea is the feeling that you have an upset stomach or that you are about to vomit. Nausea on its own is not usually a serious concern, but it may be an early sign of a more serious medical problem.  If vomiting develops, or if you are not able to drink enough fluids, you are at risk of becoming dehydrated.  Follow recommendations for eating and drinking and take over-the-counter and prescription medicines only as told by your health care provider.  Contact a health care provider right away if your symptoms worsen or you have new symptoms.  Keep all follow-up visits. This is important.  This information is not intended to replace advice given to you by your health care provider. Make sure you discuss any questions you have with your health care provider.    Document Revised: 06/24/2022 Document Reviewed: 06/24/2022

## 2025-01-05 NOTE — ED ADULT NURSE NOTE - BREATHING, MLM
How Severe Are Your Spot(S)?: mild What Type Of Note Output Would You Prefer (Optional)?: Standard Output What Is The Reason For Today's Visit?: Full Body Skin Examination What Is The Reason For Today's Visit? (Being Monitored For X): concerning skin lesions on an annual basis Spontaneous, unlabored and symmetrical

## 2025-01-05 NOTE — ED PROVIDER NOTE - CLINICAL SUMMARY MEDICAL DECISION MAKING FREE TEXT BOX
19y F c/o nausea after intercourse, will give zofran and PO challenge, no indication for further work up at this time

## 2025-01-05 NOTE — ED ADULT TRIAGE NOTE - WEIGHT METHOD
Nursing notes reviewed and accepted.    Kiko, 6  Yrs 9  Mos, accompanied by  parents, Juan and Dedra, is being seen for right ankle injury. Patient playing basketball on Saturday the fourth, as he landed he was stepped on by another player and heard a crack. He has basically not been able to bear weight since. Family did have an Aircast support at home, use that to hobble around. Also applied ice and compression. Swollen over the lateral aspect of the ankle..        I have personally reviewed  the following EPIC sections: Current medications, Allergies, Problem list, Past Medical History, Past Surgical History, Social History and Family History.  Comments: Nothing further.    MEDICATIONS: Acetaminophen.     ALLERGIES: ALLERGIES:  No Known Allergies .    REVIEW OF SYSTEMS: As above.    OBJECTIVE:  VITAL SIGNS:    Visit Vitals   • /72 (BP Location: [facility], Patient Position: Sitting, Cuff Size: Regular)   • Pulse 78   • Temp 98.7 °F (37.1 °C) (Tympanic)   • Resp 16   • Ht 4' 8.75\" (1.441 m)   • Wt 29.8 kg   • BMI 14.32 kg/m2     GENERAL:  The patient is alert and in no acute distress  MUSCULOSKELETAL: Patient examined in a wheelchair which she used to get to the exam room. Does not want to bear weight. Left for comparative purposes no swelling. Right with perimalleolar swelling laterally and mild to moderate ecchymosis as well in the same area. No medial tenderness including tibia. No Achilles tenderness. No calcaneal tenderness. Most tender over calcaneal fibular and anterior talofibular ligament. No interosseous tenderness. Tender distal fibula about 4 cm above the malleolus as well as a lateral malleolar area    DIAGNOSTICS: X-ray normal to my interpretation.    ASSESSMENT: Right ankle sprain.    PLAN: Boot for ambulation until pain-free walking. Then increase activity as tolerated. Letter provided for excuse from gym activity through February 19. Offered influenza vaccine, declined.     actual

## 2025-01-05 NOTE — ED ADULT NURSE NOTE - OBJECTIVE STATEMENT
Pt presents to the ED with reports of nausea after having intercourse yesterday. Pt also reports accidentally hitting her head into the wall tonight. Pt relays having her vertigo symptoms, and sensitivity to light. Pt is wearing sunglasses, concerned about her BP. Pt denies any vomiting, no abdominal pain.

## 2025-01-08 ENCOUNTER — EMERGENCY (EMERGENCY)
Facility: HOSPITAL | Age: 20
LOS: 1 days | Discharge: LEFT BEFORE DISCHARGE | End: 2025-01-08
Admitting: EMERGENCY MEDICINE
Payer: MEDICAID

## 2025-01-08 VITALS
TEMPERATURE: 98 F | DIASTOLIC BLOOD PRESSURE: 84 MMHG | SYSTOLIC BLOOD PRESSURE: 128 MMHG | OXYGEN SATURATION: 100 % | HEART RATE: 84 BPM | RESPIRATION RATE: 16 BRPM

## 2025-01-08 VITALS
HEART RATE: 78 BPM | OXYGEN SATURATION: 100 % | WEIGHT: 130.07 LBS | RESPIRATION RATE: 14 BRPM | HEIGHT: 63 IN | DIASTOLIC BLOOD PRESSURE: 67 MMHG | TEMPERATURE: 98 F | SYSTOLIC BLOOD PRESSURE: 120 MMHG

## 2025-01-08 DIAGNOSIS — Z78.9 OTHER SPECIFIED HEALTH STATUS: Chronic | ICD-10-CM

## 2025-01-08 PROCEDURE — L9991: CPT

## 2025-01-18 ENCOUNTER — EMERGENCY (EMERGENCY)
Facility: HOSPITAL | Age: 20
LOS: 1 days | Discharge: HOME | End: 2025-01-18
Attending: EMERGENCY MEDICINE | Admitting: EMERGENCY MEDICINE
Payer: MEDICAID

## 2025-01-18 VITALS
DIASTOLIC BLOOD PRESSURE: 82 MMHG | WEIGHT: 140.43 LBS | HEIGHT: 63 IN | RESPIRATION RATE: 19 BRPM | HEART RATE: 93 BPM | OXYGEN SATURATION: 100 % | TEMPERATURE: 99 F | SYSTOLIC BLOOD PRESSURE: 125 MMHG

## 2025-01-18 VITALS
SYSTOLIC BLOOD PRESSURE: 127 MMHG | DIASTOLIC BLOOD PRESSURE: 81 MMHG | OXYGEN SATURATION: 100 % | HEART RATE: 95 BPM | TEMPERATURE: 98 F | RESPIRATION RATE: 16 BRPM

## 2025-01-18 DIAGNOSIS — Z78.9 OTHER SPECIFIED HEALTH STATUS: Chronic | ICD-10-CM

## 2025-01-18 PROCEDURE — 99284 EMERGENCY DEPT VISIT MOD MDM: CPT

## 2025-01-18 PROCEDURE — 99283 EMERGENCY DEPT VISIT LOW MDM: CPT

## 2025-01-18 RX ORDER — ONDANSETRON 4 MG/1
1 TABLET ORAL
Qty: 1 | Refills: 0
Start: 2025-01-18

## 2025-01-18 NOTE — ED PROVIDER NOTE - OBJECTIVE STATEMENT
19y F reports episode of nausea after taking cipro for UTI, nausea has now subsided, no abd pain, no vomiting, no diarrhea, no fever, pt has no other complaints at this time, asking for food

## 2025-01-18 NOTE — ED PROVIDER NOTE - NSFOLLOWUPINSTRUCTIONS_ED_ALL_ED_FT
zofran has been prescribed to take as needed for nausea.    make sure to eat when you take your medicine.    return for new/worsening urgent symptoms.

## 2025-01-18 NOTE — ED ADULT NURSE NOTE - DISCHARGE DATE/TIME
Render Risk Assessment In Note?: no Additional Notes: We discussed cosmetic treatment options for neck skin laxity (lift procedures) and injectables for facial rhytids.  She saw [facility] last year, but did not have a good experience.  I suggested and provided contact info for [facility].  They could address the above and the venous lake. Detail Level: Zone 18-Jan-2025 21:37

## 2025-01-18 NOTE — ED PROVIDER NOTE - CARE PROVIDER_API CALL
Yo Nunez  Gastroenterology  [address] [place] [id number]  Phone: [phone]  Fax: [phone]  Follow Up Time: Routine

## 2025-01-18 NOTE — ED PROVIDER NOTE - PATIENT PORTAL LINK FT
You can access the FollowMyHealth Patient Portal offered by [facility] by registering at the following website: [email or web address]. By joining [employer]’s FollowMyHealth portal, you will also be able to view your health information using other applications (apps) compatible with our system.

## 2025-01-18 NOTE — ED PROVIDER NOTE - CLINICAL SUMMARY MEDICAL DECISION MAKING FREE TEXT BOX
pt reports nausea after taking cipro for uti, no nausea now, declines antiemetic at this time, taking PO in the ED, will rx zofran prn

## 2025-01-18 NOTE — ED ADULT NURSE NOTE - NURSING ED SKIN COLOR
Hypotensive this admission most recently 130s/60s  -Patient has remained low hypertensive to normotensive over the past 24 hours  -Will continue with current treatment, monitor for changes and intervene as appropriate      normal for race

## 2025-01-23 ENCOUNTER — EMERGENCY (EMERGENCY)
Facility: HOSPITAL | Age: 20
LOS: 1 days | Discharge: HOME | End: 2025-01-23
Attending: STUDENT IN AN ORGANIZED HEALTH CARE EDUCATION/TRAINING PROGRAM | Admitting: STUDENT IN AN ORGANIZED HEALTH CARE EDUCATION/TRAINING PROGRAM
Payer: MEDICAID

## 2025-01-23 VITALS
HEIGHT: 63 IN | DIASTOLIC BLOOD PRESSURE: 73 MMHG | HEART RATE: 88 BPM | SYSTOLIC BLOOD PRESSURE: 113 MMHG | WEIGHT: 130.07 LBS | TEMPERATURE: 99 F | OXYGEN SATURATION: 100 % | RESPIRATION RATE: 20 BRPM

## 2025-01-23 DIAGNOSIS — Z78.9 OTHER SPECIFIED HEALTH STATUS: Chronic | ICD-10-CM

## 2025-01-23 PROCEDURE — 99283 EMERGENCY DEPT VISIT LOW MDM: CPT | Mod: 25

## 2025-01-24 LAB
APPEARANCE UR: ABNORMAL
BACTERIA # UR AUTO: ABNORMAL /HPF
BILIRUB UR-MCNC: ABNORMAL
COLOR SPEC: ABNORMAL
DIFF PNL FLD: ABNORMAL
EPI CELLS # UR: PRESENT
GLUCOSE UR QL: NEGATIVE MG/DL — SIGNIFICANT CHANGE UP
HCG UR QL: NEGATIVE — SIGNIFICANT CHANGE UP
KETONES UR-MCNC: NEGATIVE MG/DL — SIGNIFICANT CHANGE UP
LEUKOCYTE ESTERASE UR-ACNC: ABNORMAL
NITRITE UR-MCNC: NEGATIVE — SIGNIFICANT CHANGE UP
PH UR: 6.5 — SIGNIFICANT CHANGE UP (ref 5–8)
PROT UR-MCNC: 100 MG/DL
RBC CASTS # UR COMP ASSIST: ABNORMAL /HPF
SP GR SPEC: 1.03 — HIGH (ref 1–1.03)
UROBILINOGEN FLD QL: 1 MG/DL — SIGNIFICANT CHANGE UP (ref 0.2–1)
WBC UR QL: 10 /HPF — HIGH (ref 0–5)

## 2025-01-24 PROCEDURE — 99283 EMERGENCY DEPT VISIT LOW MDM: CPT

## 2025-01-24 PROCEDURE — 87086 URINE CULTURE/COLONY COUNT: CPT

## 2025-01-24 PROCEDURE — 81025 URINE PREGNANCY TEST: CPT

## 2025-01-24 PROCEDURE — 81001 URINALYSIS AUTO W/SCOPE: CPT

## 2025-01-24 NOTE — ED PROVIDER NOTE - NSFOLLOWUPINSTRUCTIONS_ED_ALL_ED_FT
Follow up with your primary care doctor in 2-3 days for further evaluation of your symptoms     A urinary tract infection (UTI) is an infection of any part of the urinary tract, which includes the kidneys, ureters, bladder, and urethra. Risk factors include ignoring your need to urinate, wiping back to front if female, being an uncircumcised male, and having diabetes or a weak immune system. Symptoms include frequent urination, pain or burning with urination, foul smelling urine, cloudy urine, pain in the lower abdomen, blood in the urine, and fever. If you were prescribed an antibiotic medicine, take it as told by your health care provider. Do not stop taking the antibiotic even if you start to feel better.    SEEK IMMEDIATE MEDICAL CARE IF YOU HAVE ANY OF THE FOLLOWING SYMPTOMS: severe back or abdominal pain, fever, inability to keep fluids or medicine down, dizziness/lightheadedness, or a change in mental status. Follow up with your primary care doctor in 2-3 days for further evaluation of your symptoms     Continue to take Ciprofloxacin as previously prescribed    A urinary tract infection (UTI) is an infection of any part of the urinary tract, which includes the kidneys, ureters, bladder, and urethra. Risk factors include ignoring your need to urinate, wiping back to front if female, being an uncircumcised male, and having diabetes or a weak immune system. Symptoms include frequent urination, pain or burning with urination, foul smelling urine, cloudy urine, pain in the lower abdomen, blood in the urine, and fever. If you were prescribed an antibiotic medicine, take it as told by your health care provider. Do not stop taking the antibiotic even if you start to feel better.    SEEK IMMEDIATE MEDICAL CARE IF YOU HAVE ANY OF THE FOLLOWING SYMPTOMS: severe back or abdominal pain, fever, inability to keep fluids or medicine down, dizziness/lightheadedness, or a change in mental status.

## 2025-01-24 NOTE — ED ADULT NURSE NOTE - HOW MANY DRINKS CONTAINING ALCOHOL DO YOU HAVE ON A TYPICAL DAY WHEN YOU ARE DRINKING?
Occupational Therapy Daily Note:    Today's date: 2023  Patient name: Justina Mackey  : 1959  MRN: [id number]  Referring provider: Aguilar Ng,*  Dx:   Encounter Diagnosis   Name Primary? • Lewy body dementia without behavioral disturbance ([address] ) Yes      POC START DATE: 2023  POC END DATE: 2023  NEXT PN DUE: 2023  FREQUENCY: 1-2x wk for 12 weeks     Subjective: No reported changes  Objective: Therapeutic Activity -     Memory Recall  People's Names  First names with multiple re-checks  First and Last names with multiple re-checks  Time: 10 minutes    Visual Perception  Q-Bitz Brain Puzzle  Completed x 1 with few errors  Time: 15 minutes    Picture Memory Recall  Completed x 2   Time: 20 minutes    Assessment: Pt tolerated treatment well  Pt demonstrated fair memory recall of pictures  Difficulty with memory recall people's names  Increased difficulty with first and last names compared to first names only  Few errors with Q-bitz visual perception puzzle  Will continue to progress pt as tolerated  Plan: Future OT sessions to focus on expressive categorize, attention, memory, and executive functioning with functional problem solving  Continued skilled OT per POC  1 or 2

## 2025-01-24 NOTE — ED PROVIDER NOTE - PHYSICAL EXAMINATION
Gen: NAD, non-toxic appearing, awake alert   HEENT:  normal conjunctiva, oral mucosa moist  Lung: CTAB, no respiratory distress, no wheezes/rhonchi/rales B/L, speaking in full sentences  CV: RRR  Abd: soft, NT, ND, no guarding, no rigidity, no rebound tenderness  MSK: no visible deformities, ROM normal in UE/LE  Skin: Warm, well perfused

## 2025-01-24 NOTE — ED PROVIDER NOTE - PATIENT PORTAL LINK FT
You can access the FollowMyHealth Patient Portal offered by [facility] by registering at the following website: [email or web address]. By joining [employer]’s FollowMyHealth portal, you will also be able to view your health information using other applications (apps) compatible with our system.

## 2025-01-24 NOTE — ED ADULT NURSE NOTE - OBJECTIVE STATEMENT
Pt presents to the ED with concerns regarding treatment for UTI. Pt was prescribed cipro by urgent care. Pt states she continues to have burning with urination and is worried about sepsis. Pt is anxious, reassured.

## 2025-01-24 NOTE — ED PROVIDER NOTE - PROGRESS NOTE DETAILS
UA is orange but likely from UTI. Has blood likely b/c pt is currently menstruating. Has some sq epithelial so slightly contaminated. Has small LE, few bacteria, 10 wbc. Unknown what pt's last UA showed at U/C and she does not know as well. will instruct pt to c/w cipro at this time. explained results of w/u to pt and all quests answered. UCx sent. will dc to home with return precautions

## 2025-01-24 NOTE — ED PROVIDER NOTE - CLINICAL SUMMARY MEDICAL DECISION MAKING FREE TEXT BOX
19F p/w urinary symptoms, already on cipro. VS and exam as above  DDx includes but not limited to: Will eval for worsening UTI. Low concern for sepsis, dehydration, slava, kidney stone  Plan: UA/UCX, reassess symptoms    See Progress Notes for further updates on ED Course

## 2025-01-25 ENCOUNTER — EMERGENCY (EMERGENCY)
Facility: HOSPITAL | Age: 20
LOS: 1 days | Discharge: LEFT BEFORE DISCHARGE | End: 2025-01-25
Admitting: STUDENT IN AN ORGANIZED HEALTH CARE EDUCATION/TRAINING PROGRAM
Payer: MEDICAID

## 2025-01-25 VITALS
OXYGEN SATURATION: 100 % | DIASTOLIC BLOOD PRESSURE: 84 MMHG | HEIGHT: 63 IN | HEART RATE: 75 BPM | WEIGHT: 130.07 LBS | RESPIRATION RATE: 18 BRPM | SYSTOLIC BLOOD PRESSURE: 124 MMHG | TEMPERATURE: 98 F

## 2025-01-25 DIAGNOSIS — Z78.9 OTHER SPECIFIED HEALTH STATUS: Chronic | ICD-10-CM

## 2025-01-25 LAB
CULTURE RESULTS: NO GROWTH — SIGNIFICANT CHANGE UP
SPECIMEN SOURCE: SIGNIFICANT CHANGE UP

## 2025-01-25 PROCEDURE — L9991: CPT

## 2025-01-26 ENCOUNTER — EMERGENCY (EMERGENCY)
Facility: HOSPITAL | Age: 20
LOS: 1 days | Discharge: HOME | End: 2025-01-26
Attending: STUDENT IN AN ORGANIZED HEALTH CARE EDUCATION/TRAINING PROGRAM | Admitting: STUDENT IN AN ORGANIZED HEALTH CARE EDUCATION/TRAINING PROGRAM
Payer: MEDICAID

## 2025-01-26 VITALS
DIASTOLIC BLOOD PRESSURE: 77 MMHG | HEART RATE: 81 BPM | HEIGHT: 63 IN | OXYGEN SATURATION: 98 % | SYSTOLIC BLOOD PRESSURE: 119 MMHG | RESPIRATION RATE: 18 BRPM | WEIGHT: 179.9 LBS | TEMPERATURE: 99 F

## 2025-01-26 DIAGNOSIS — Z78.9 OTHER SPECIFIED HEALTH STATUS: Chronic | ICD-10-CM

## 2025-01-26 PROCEDURE — 99282 EMERGENCY DEPT VISIT SF MDM: CPT

## 2025-01-26 PROCEDURE — 99283 EMERGENCY DEPT VISIT LOW MDM: CPT

## 2025-01-26 NOTE — ED ADULT NURSE NOTE - CHIEF COMPLAINT QUOTE
" I red color on my stool since yesterday " Pt was in the [facility] last night by left piror to be seen

## 2025-01-26 NOTE — ED ADULT NURSE NOTE - EXTENSIONS OF SELF_ADULT
History and Physical    Subjective:     Alek Collins is a 68 y.o.  female who presents with upper lid ptosis creating visual field defecits. Had noticed that each lid had dropped down on separate occasions, several years ago. Patient also had aging face issues for which she sought cosmetic facial procedures.           Past Medical History:   Diagnosis Date    Actinic keratosis     Left arm    Adjustment disorder with mixed anxiety and depressed mood     Adverse effect of anesthesia     HARD TO WAKE UP SLOW BREATHING ,Pt stated doesn't take much anesthesia    Diverticulitis     Kidney stones 95,96,98    left    Left knee DJD     Lumbar nerve root impingement     RLQ pain    Menopause     LMP-53years old    Nausea & vomiting     Other and unspecified hyperlipidemia     Other idiopathic scoliosis, thoracolumbar region 2018    Postmenopausal HRT (hormone replacement therapy)     Right knee DJD       Past Surgical History:   Procedure Laterality Date    COLONOSCOPY N/A 2016    COLONOSCOPY performed by Rodger Vines MD at [facility] ENDOSCOPY    HX APPENDECTOMY      HX  SECTION      X2    HX HYSTERECTOMY      LMP-53years old   [facility] HX KNEE REPLACEMENT Left 2016    HX LITHOTRIPSY      X 2    HX MENISCECTOMY      left knee    HX ORTHOPAEDIC  10    left shoulder repair; 14 screws and 2 plates    HX ORTHOPAEDIC      BROKEN ARM SURGERY X2    HX ORTHOPAEDIC Bilateral     SCOPE of diana bilateral    HX ORTHOPAEDIC       REPAIR OF Left MENISCUS    HX TONSILLECTOMY      HX TUBAL LIGATION      BSPO adhesion conization abn pap     Family History   Problem Relation Age of Onset    Cancer Mother         colon    Arthritis-osteo Mother     Anesth Problems Mother         delayed awakening    Cancer Maternal Grandmother         stomach    Heart Disease Father 80    Heart Attack Maternal Grandfather [de-identified]    Heart Attack Paternal Grandmother 80    Diabetes Cousin cervical cancer      Social History     Tobacco Use    Smoking status: Never Smoker    Smokeless tobacco: Never Used   Substance Use Topics    Alcohol use: Yes     Alcohol/week: 0.8 standard drinks     Types: 1 Standard drinks or equivalent per week     Comment: <1/wk       Prior to Admission medications    Medication Sig Start Date End Date Taking? Authorizing Provider   ondansetron (ZOFRAN ODT) 8 mg disintegrating tablet Take 1 Tab by mouth every eight (8) hours as needed for Nausea. 7/25/19  Yes Solange Tompkins MD   bacitracin zinc (BACITRACIN) ointment Apply  to affected area two (2) times a day. 7/25/19  Yes Solange Tompkins MD   levoFLOXacin (LEVAQUIN) 250 mg tablet Take 2 Tabs by mouth daily. 7/25/19  Yes Solange Tompkins MD   furosemide (LASIX) 20 mg tablet TAKE ONE TABLET BY MOUTH TWICE A DAY 6/4/19  Yes Bishop Noah MD   zolpidem (AMBIEN) 5 mg tablet TAKE ONE TABLET BY MOUTH EVERY NIGHT AT BEDTIME AS NEEDED 4/15/19  Yes Bishop Noah MD   naproxen sodium (ALEVE) 220 mg tablet Take 440 mg by mouth as needed. Provider, Historical     Allergies   Allergen Reactions    Codeine Rash     Rash and swelling on arm    Adhesive Tape-Silicones Rash    Keflex [Cephalexin] Nausea and Vomiting    Tramadol Other (comments)     Drowsy      Wellbutrin [Bupropion Hcl] Other (comments)     fatigue        Review of Systems:  A comprehensive review of systems was negative except for that written in the History of Present Illness. Objective: Intake and Output:    No intake/output data recorded. No intake/output data recorded. Physical Exam:   Visit Vitals  /74   Pulse 87   Temp 98.6 °F (37 °C)   Resp 20   Ht 5' 3.5\" (1.613 m)   Wt 90.3 kg (199 lb)   SpO2 93%   BMI 34.70 kg/m²     General:  Alert, cooperative, no distress, appears stated age. Head:  Normocephalic, without obvious abnormality, atraumatic. Bilateral upper lid ptosis, margin at pupils with blepharochalasis  Aging face changes.    Brow ptosis, flattened. Eyes:  Conjunctivae/corneas clear. PERRL, EOMs intact. Fundi benign. Ears:  Normal TMs and external ear canals both ears. Nose: Nares normal. Septum midline. Mucosa normal. No drainage or sinus tenderness. Throat: Lips, mucosa, and tongue normal. Teeth and gums normal.   Neck: Supple, symmetrical, trachea midline, no adenopathy, thyroid: no enlargement/tenderness/nodules, no carotid bruit and no JVD. Back:   Symmetric, no curvature. ROM normal. No CVA tenderness. Lungs:   Clear to auscultation bilaterally. Chest wall:  No tenderness or deformity. Heart:  Regular rate and rhythm, S1, S2 normal, no murmur, click, rub or gallop. Extremities: Extremities normal, atraumatic, no cyanosis or edema. Pulses: 2+ and symmetric all extremities. Skin: Skin color, texture, turgor normal. No rashes or lesions. Lymph nodes: Cervical, supraclavicular, and axillary nodes normal.   Neurologic: CNII-XII intact. Normal strength, sensation and reflexes throughout. Data Review:   No results found for this or any previous visit (from the past 24 hour(s)). Assessment:     Bilateral upper lid ptosis  Aging face changes.      Plan:     Upper lid ptosis repair  Blepharoplasties  Brow lift  Rhytidectomy    Signed By: Jermaine Swartz MD     August 11, 2019 None

## 2025-01-26 NOTE — ED PROVIDER NOTE - OBJECTIVE STATEMENT
19-year-old female with history of vertigo, migraines, ADHD, autism, and anxiety presents with episodes of faint reddish color stools the past couple days.  Denies any bright red stools.  Denies any black stools.  Denies any abdominal pain.  Denies any nausea or vomiting.  Patient recently just finished Cipro for UTI.  Denies any current urinary symptoms.  No fevers.  No history of GI bleeds in the past.  Has been seen in the emergency room in the past for same complaint with negative occult blood and normal H&H.  Denies any weakness or dizziness  PCP Chasity Art

## 2025-01-26 NOTE — ED PROVIDER NOTE - NSFOLLOWUPINSTRUCTIONS_ED_ALL_ED_FT
follow up with GI if symptoms continue, referral provided if needed        Rectal Bleeding  Body outline showing the digestive tract, including the anus.  Rectal bleeding is when blood comes out of the opening of the butt (anus). You may see bright red blood in your underwear or in the toilet after you poop (have a bowel movement). You may also have blood mixed with your poop (stool), or dark red or black poop.    Rectal bleeding is often a sign that something is wrong. It can be caused by many things. It needs to be checked by a doctor.    Follow these instructions at home:  Medicines    Take over-the-counter and prescription medicines only as told by your doctor.  Ask your doctor about changing or stopping your normal medicines. These include blood thinners.  Managing constipation    Pear, berries, artichoke, and dried beans.  Your condition may cause trouble pooping (constipation). To prevent or treat this, or to help make your poop soft, you may need to:  Drink enough fluid to keep your pee (urine) pale yellow.  Take over-the-counter or prescription medicines.  Eat foods that are high in fiber. These include beans, whole grains, and fresh fruits and vegetables.  Limit foods that are high in fat and sugar. These include fried or sweet foods.  General instructions    Try not to strain when you poop.  Take a warm bath. This may help with pain.  Watch for changes in your symptoms.  Contact a doctor if:  You have pain or swelling in your belly (abdomen).  You have a fever.  You feel weak or like you may vomit.  You cannot poop.  You have new or more bleeding.  You have black or dark red poop.  You vomit blood or something that looks like coffee grounds.  Get help right away if:  You faint.  You have very bad pain in your butt.  These symptoms may be an emergency. Get help right away. Call 911.  Do not wait to see if the symptoms will go away.  Do not drive yourself to the hospital.  This information is not intended to replace advice given to you by your health care provider. Make sure you discuss any questions you have with your health care provider.

## 2025-01-26 NOTE — ED PROVIDER NOTE - CARE PROVIDER_API CALL
Yo Nunez  Gastroenterology  [address] [place] [id number]  Phone: [phone]  Fax: [phone]  Follow Up Time: 1-3 Days

## 2025-01-26 NOTE — ED ADULT TRIAGE NOTE - LOCATION:
Left arm; Ivermectin Pregnancy And Lactation Text: This medication is Pregnancy Category C and it isn't known if it is safe during pregnancy. It is also excreted in breast milk.

## 2025-01-26 NOTE — ED PROVIDER NOTE - CLINICAL SUMMARY MEDICAL DECISION MAKING FREE TEXT BOX
Patient is a 19y old  Female with PMh of autism, anxiety who presents with a chief complaint of bloody stool. patient noticed some redness mixed in with her stool, no gross bleeding. no AC use, denies abdominal pain, vomiting or fevers. she has been seen in the past with similar symptoms and has also had negative FOBT.    rectal exam performed by female PA who reports brown stool on rectal exam without gross bleeding or blood seen. patient has no abdominal pain and n acute distress. she has no risk factors for GI bleeding.    Patient was advised to follow up with GI doctor outpatient, no indication for further work up at this time. The Patient will be discharged home at this time.  All questions were answered at this time.     Patient was told to follow up with their PCP within the next 2 days. they were told to return to the ED if they have worsening bleeding, vomiting, fevers, abdominal pain    patient will be discharged home at this time, they are in agreement with the plan

## 2025-01-26 NOTE — ED PROVIDER NOTE - GASTROINTESTINAL, MLM
Abdomen soft, non-tender, no guarding. light brown stool on rectal exam. no bright red stool per rectum or melena

## 2025-01-26 NOTE — ED PROVIDER NOTE - PROGRESS NOTE DETAILS
Patient stable.  Overall appears well.  Vital stable.  No tachycardia.  No weakness or dizziness.  No celio blood or melena on rectal exam.  No abdominal pain.  Discussed with patient symptoms may related to internal hemorrhoid.  Recommend follow-up with GI if symptoms continue or fail to improve.  Referral provided if needed.  Has had previous visit for possible GI bleed in the past with normal H&H and negative occult blood.  Strict return ER precautions explained

## 2025-01-26 NOTE — ED ADULT NURSE NOTE - OBJECTIVE STATEMENT
Pt came in ambulatory stated that there is red color on her stool since yesterday . Pt denies any vomiting, nausea, abdominal pain , diarrhea , fever or chills. Pt was see multiple times in past in the Ed  - Pt was registered last night was left prior to be seen by the MD Pt examined by ANTONIA Sevilla - reported no gross rectal bleeding noted

## 2025-01-26 NOTE — ED ADULT TRIAGE NOTE - CHIEF COMPLAINT QUOTE
" I red color on my stool since yesterday " Pt was in the [place] Ed last night by left piror to be seen

## 2025-01-26 NOTE — ED PROVIDER NOTE - PATIENT PORTAL LINK FT
You can access the FollowMyHealth Patient Portal offered by [facility] by registering at the following website: [email or web address]. By joining [employer]’s FollowMyHealth portal, you will also be able to view your health information using other applications (apps) compatible with our system.

## 2025-01-27 NOTE — BEHAVIORAL HEALTH ASSESSMENT NOTE - NSBHCONSFOLLOWNEEDS_PSY_A_CORE
Patient attended Phase 2 Cardiac Rehab Exercise Session. Further documentation will be scanned into the medical record upon discharge.  
Patient needs further psychiatric safety assessment prior to discharge

## 2025-03-01 ENCOUNTER — EMERGENCY (EMERGENCY)
Facility: HOSPITAL | Age: 20
LOS: 1 days | Discharge: LEFT BEFORE DISCHARGE | End: 2025-03-01
Admitting: EMERGENCY MEDICINE
Payer: MEDICAID

## 2025-03-01 VITALS
HEART RATE: 72 BPM | WEIGHT: 125 LBS | OXYGEN SATURATION: 100 % | HEIGHT: 63 IN | SYSTOLIC BLOOD PRESSURE: 122 MMHG | RESPIRATION RATE: 18 BRPM | DIASTOLIC BLOOD PRESSURE: 77 MMHG | TEMPERATURE: 98 F

## 2025-03-01 DIAGNOSIS — Z78.9 OTHER SPECIFIED HEALTH STATUS: Chronic | ICD-10-CM

## 2025-03-01 PROCEDURE — L9991: CPT

## 2025-03-01 NOTE — ED ADULT TRIAGE NOTE - CHIEF COMPLAINT QUOTE
c/o left lower toothache x4 days. Seen at urgent care yesterday and started on antibiotic. "I picked up medication but I lost the bottle".

## 2025-03-01 NOTE — ED ADULT TRIAGE NOTE - CCCP TRG CHIEF CMPLNT
toothache You can access the FollowMyHealth Patient Portal offered by [facility] by registering at the following website: [email or web address]. By joining [employer]’s FollowMyHealth portal, you will also be able to view your health information using other applications (apps) compatible with our system.

## 2025-03-08 ENCOUNTER — NON-APPOINTMENT (OUTPATIENT)
Age: 20
End: 2025-03-08

## 2025-03-09 ENCOUNTER — NON-APPOINTMENT (OUTPATIENT)
Age: 20
End: 2025-03-09

## 2025-03-27 ENCOUNTER — EMERGENCY (EMERGENCY)
Facility: HOSPITAL | Age: 20
LOS: 1 days | Discharge: HOME | End: 2025-03-27
Attending: STUDENT IN AN ORGANIZED HEALTH CARE EDUCATION/TRAINING PROGRAM | Admitting: STUDENT IN AN ORGANIZED HEALTH CARE EDUCATION/TRAINING PROGRAM
Payer: MEDICAID

## 2025-03-27 VITALS
DIASTOLIC BLOOD PRESSURE: 83 MMHG | SYSTOLIC BLOOD PRESSURE: 123 MMHG | HEART RATE: 85 BPM | RESPIRATION RATE: 18 BRPM | OXYGEN SATURATION: 100 %

## 2025-03-27 VITALS
HEIGHT: 63 IN | TEMPERATURE: 98 F | WEIGHT: 137.13 LBS | DIASTOLIC BLOOD PRESSURE: 83 MMHG | SYSTOLIC BLOOD PRESSURE: 123 MMHG | HEART RATE: 85 BPM | OXYGEN SATURATION: 100 % | RESPIRATION RATE: 18 BRPM

## 2025-03-27 DIAGNOSIS — Z78.9 OTHER SPECIFIED HEALTH STATUS: Chronic | ICD-10-CM

## 2025-03-27 LAB
ALBUMIN SERPL ELPH-MCNC: 4.5 G/DL — SIGNIFICANT CHANGE UP (ref 3.3–5)
ALP SERPL-CCNC: 80 U/L — SIGNIFICANT CHANGE UP (ref 30–120)
ALT FLD-CCNC: 19 U/L — SIGNIFICANT CHANGE UP (ref 10–60)
APTT BLD: 33.2 SEC — SIGNIFICANT CHANGE UP (ref 24.5–35.6)
AST SERPL-CCNC: 15 U/L — SIGNIFICANT CHANGE UP (ref 10–40)
BASOPHILS # BLD AUTO: 0.07 K/UL — SIGNIFICANT CHANGE UP (ref 0–0.2)
BASOPHILS NFR BLD AUTO: 0.8 % — SIGNIFICANT CHANGE UP (ref 0–2)
BILIRUB SERPL-MCNC: 0.3 MG/DL — SIGNIFICANT CHANGE UP (ref 0.2–1.2)
BUN SERPL-MCNC: 10 MG/DL — SIGNIFICANT CHANGE UP (ref 7–23)
CALCIUM SERPL-MCNC: 9.5 MG/DL — SIGNIFICANT CHANGE UP (ref 8.4–10.5)
CHLORIDE SERPL-SCNC: 103 MMOL/L — SIGNIFICANT CHANGE UP (ref 96–108)
CO2 SERPL-SCNC: 27 MMOL/L — SIGNIFICANT CHANGE UP (ref 22–31)
CREAT SERPL-MCNC: 0.66 MG/DL — SIGNIFICANT CHANGE UP (ref 0.5–1.3)
EGFR: 128 ML/MIN/1.73M2 — SIGNIFICANT CHANGE UP
EGFR: 128 ML/MIN/1.73M2 — SIGNIFICANT CHANGE UP
EOSINOPHIL # BLD AUTO: 0.03 K/UL — SIGNIFICANT CHANGE UP (ref 0–0.5)
EOSINOPHIL NFR BLD AUTO: 0.3 % — SIGNIFICANT CHANGE UP (ref 0–6)
GLUCOSE SERPL-MCNC: 95 MG/DL — SIGNIFICANT CHANGE UP (ref 70–99)
HCT VFR BLD CALC: 38.7 % — SIGNIFICANT CHANGE UP (ref 34.5–45)
HGB BLD-MCNC: 12.4 G/DL — SIGNIFICANT CHANGE UP (ref 11.5–15.5)
IMM GRANULOCYTES NFR BLD AUTO: 0.3 % — SIGNIFICANT CHANGE UP (ref 0–0.9)
INR BLD: 1.16 RATIO — SIGNIFICANT CHANGE UP (ref 0.85–1.16)
LYMPHOCYTES # BLD AUTO: 22.4 % — SIGNIFICANT CHANGE UP (ref 13–44)
MCHC RBC-ENTMCNC: 27.6 PG — SIGNIFICANT CHANGE UP (ref 27–34)
MCHC RBC-ENTMCNC: 32 G/DL — SIGNIFICANT CHANGE UP (ref 32–36)
MCV RBC AUTO: 86.2 FL — SIGNIFICANT CHANGE UP (ref 80–100)
MONOCYTES # BLD AUTO: 0.49 K/UL — SIGNIFICANT CHANGE UP (ref 0–0.9)
NEUTROPHILS # BLD AUTO: 6.51 K/UL — SIGNIFICANT CHANGE UP (ref 1.8–7.4)
NEUTROPHILS NFR BLD AUTO: 70.9 % — SIGNIFICANT CHANGE UP (ref 43–77)
PLATELET # BLD AUTO: 358 K/UL — SIGNIFICANT CHANGE UP (ref 150–400)
POTASSIUM SERPL-MCNC: 4.1 MMOL/L — SIGNIFICANT CHANGE UP (ref 3.5–5.3)
POTASSIUM SERPL-SCNC: 4.1 MMOL/L — SIGNIFICANT CHANGE UP (ref 3.5–5.3)
PROTHROM AB SERPL-ACNC: 13.7 SEC — HIGH (ref 9.9–13.4)
RBC # BLD: 4.49 M/UL — SIGNIFICANT CHANGE UP (ref 3.8–5.2)
RBC # FLD: 12.4 % — SIGNIFICANT CHANGE UP (ref 10.3–14.5)
SODIUM SERPL-SCNC: 140 MMOL/L — SIGNIFICANT CHANGE UP (ref 135–145)
WBC # BLD: 9.19 K/UL — SIGNIFICANT CHANGE UP (ref 3.8–10.5)
WBC # FLD AUTO: 9.19 K/UL — SIGNIFICANT CHANGE UP (ref 3.8–10.5)

## 2025-03-27 PROCEDURE — 93010 ELECTROCARDIOGRAM REPORT: CPT

## 2025-03-27 PROCEDURE — 93005 ELECTROCARDIOGRAM TRACING: CPT

## 2025-03-27 PROCEDURE — 71045 X-RAY EXAM CHEST 1 VIEW: CPT | Mod: 26

## 2025-03-27 PROCEDURE — 82962 GLUCOSE BLOOD TEST: CPT

## 2025-03-27 PROCEDURE — 99285 EMERGENCY DEPT VISIT HI MDM: CPT | Mod: 25

## 2025-03-27 PROCEDURE — 85730 THROMBOPLASTIN TIME PARTIAL: CPT

## 2025-03-27 PROCEDURE — 85610 PROTHROMBIN TIME: CPT

## 2025-03-27 PROCEDURE — 36415 COLL VENOUS BLD VENIPUNCTURE: CPT

## 2025-03-27 PROCEDURE — 99285 EMERGENCY DEPT VISIT HI MDM: CPT

## 2025-03-27 PROCEDURE — 70498 CT ANGIOGRAPHY NECK: CPT | Mod: MC

## 2025-03-27 PROCEDURE — 70496 CT ANGIOGRAPHY HEAD: CPT | Mod: 26

## 2025-03-27 PROCEDURE — 70496 CT ANGIOGRAPHY HEAD: CPT | Mod: MC

## 2025-03-27 PROCEDURE — 70450 CT HEAD/BRAIN W/O DYE: CPT | Mod: 26

## 2025-03-27 PROCEDURE — 71045 X-RAY EXAM CHEST 1 VIEW: CPT

## 2025-03-27 PROCEDURE — 70498 CT ANGIOGRAPHY NECK: CPT | Mod: 26

## 2025-03-27 PROCEDURE — 80053 COMPREHEN METABOLIC PANEL: CPT

## 2025-03-27 PROCEDURE — 85025 COMPLETE CBC W/AUTO DIFF WBC: CPT

## 2025-03-27 PROCEDURE — 70450 CT HEAD/BRAIN W/O DYE: CPT | Mod: MC

## 2025-03-27 RX ORDER — HYPROMELLOSE 0.4 %
1 DROPS OPHTHALMIC (EYE)
Qty: 1 | Refills: 0
Start: 2025-03-27 | End: 2025-04-05

## 2025-03-27 RX ORDER — PREDNISONE 20 MG/1
3 TABLET ORAL
Qty: 21 | Refills: 0
Start: 2025-03-27 | End: 2025-04-02

## 2025-03-27 NOTE — ED ADULT TRIAGE NOTE - CHIEF COMPLAINT QUOTE
" I have toothache weeks ago and all of the sudden , today I cant move my right side of face started at 12 noon today  "  _ Pt  was seen today [facility] - pt was examined and discharged

## 2025-03-27 NOTE — ED PROVIDER NOTE - CARE PROVIDER_API CALL
Darshan Silver  Neurology  [address] [place] [address]  Phone: [phone]  Fax: [phone]  Follow Up Time: Urgent

## 2025-03-27 NOTE — ED PROVIDER NOTE - ATTENDING APP SHARED VISIT CONTRIBUTION OF CARE
This was a shared visit with BIJU. I reviewed and verified the documentation and independently performed the documented MDM. Patient seen and examined by myself.

## 2025-03-27 NOTE — ED PROVIDER NOTE - CPE EDP ENMT NORM
Physical Therapy     Referred by: Gayatri Garay NP; Medical Diagnosis (from order):    Diagnosis Information      Diagnosis    719.41 (ICD-9-CM) - M25.512 (ICD-10-CM) - Acute pain of left shoulder                Daily Treatment Note    Visit:  7     SUBJECTIVE                                                                                                               Able to move shoulder blade better this week. Still having some pec tightness.   Pain / Symptoms:  Pain rating (out of 10): Current: 4     OBJECTIVE                                                                                                                        TREATMENT                                                                                                                  Therapeutic Exercise:  UBE 2'  Rows yellow band x15  Pull downs/extension yellow band x15  Bilateral shoulder external rotation yellow band x10    Manual Therapy:  IASTM left pec   Education on self massage for left pec  Soft tissue mobilization left upper trapezius, scalenes, and sternocleidomastoid  Application of leukotape in x-pattern for posture    Skilled input: verbal instruction/cues    Writer verbally educated and received verbal consent for hand placement, positioning of patient, and techniques to be performed today from patient for clothing adjustments for techniques as described above and how they are pertinent to the patient's plan of care.    Home Exercise Program/Education Materials: Access Code: [email or web address]  URL: [email or web address]  Date: 05/11/2022  Prepared by: Raya Kruger    Exercises  · Seated Scapular Retraction - 3 x daily - 10 reps - 1 hold  · Standing Cervical Retraction - 2 x daily - 10 reps - 1 hold  · Single Arm Doorway Pec Stretch at 60 Elevation - 2 x daily - 2 reps - 30 hold  · Chest and Bicep Stretch - Arms Behind Back - 2 x daily - 2 reps - 30 hold  · Upper Trapezius Stretch - 2 x daily - 2 reps - 30 hold      ASSESSMENT                                                                                                             Improving symptoms and posture. Utilized taping technique today to assist patient in learning to maintain proper scapular positioning.   Patient Education:   Results of above outlined education: Verbalizes understanding      PLAN                                                                                                                           Suggestions for next session as indicated: Progress per plan of care; discuss progress, goal status, and plan of care         Therapy procedure time and total treatment time can be found documented on the Time Entry flowsheet   normal...

## 2025-03-27 NOTE — ED ADULT NURSE NOTE - NSFALLUNIVINTERV_ED_ALL_ED
Bed/Stretcher in lowest position, wheels locked, appropriate side rails in place/Call bell, personal items and telephone in reach/Instruct patient to call for assistance before getting out of bed/chair/stretcher/Non-slip footwear applied when patient is off stretcher/[place] to call system/Physically safe environment - no spills, clutter or unnecessary equipment/Purposeful proactive rounding/Room/bathroom lighting operational, light cord in reach

## 2025-03-27 NOTE — ED PROVIDER NOTE - PROGRESS NOTE DETAILS
will tx for bells palsy. pt advised to tape eye shut and lubricate All imaging and labs reviewed. all results reviewed with pt including abnormal results. pt given a copy of results. pt advised to follow up with pmd regarding abnormal results. All questions answered and concerns addressed. pt verbalized understanding and agreement with plan and dx. pt advised on next step and when/where to follow up. pt advised on all take home and otc medications. pt advised to follow up with PMD. pt advised to return to ed for worsenng symptoms including fever, cp, sob. will dc. neuro follow up will tx for bells palsy. pt advised to tape eye shut and lubricate All imaging and labs reviewed. all results reviewed with pt including abnormal results. pt given a copy of results. pt advised to follow up with pmd regarding abnormal results. All questions answered and concerns addressed. pt verbalized understanding and agreement with plan and dx. pt advised on next step and when/where to follow up. pt advised on all take home and otc medications. pt advised to follow up with PMD. pt advised to return to ed for worsenng symptoms including fever, cp, sob. will dc. neuro follow up eye patch given in ed

## 2025-03-27 NOTE — ED ADULT NURSE NOTE - CHIEF COMPLAINT QUOTE
" I have toothache weeks ago and all of the sudden , today I cant move my right side of face started at 12 noon today  "  _ Pt  was seen today [facility] - pt was examined and discharged

## 2025-03-27 NOTE — ED ADULT NURSE NOTE - OBJECTIVE STATEMENT
" I have toothache weeks ago and all of the sudden , today I cant move my right side of face started at 12 noon today  "  _ Pt  was seen today [facility] - pt was examined and discharged    patient stated she had headache earlier and chest discomfort which were resolved, patient stated [facility] ED didn't to ct scan and patient likes to have one, no facial drooping noted, aaox3, ambulates in her exam room, IV accessed and tolerating. Labs drawn & sent results pending. comfort measure provided, callbell within reach, reinforced surrounding and plan of care, plan of care ongoing

## 2025-03-27 NOTE — ED PROVIDER NOTE - NSFOLLOWUPINSTRUCTIONS_ED_ALL_ED_FT
1. FOLLOW UP WITH YOUR PRIMARY DOCTOR IN 24-48 HOURS.   2. FOLLOW UP WITH ALL SPECIALIST DISCUSSED DURING YOUR VISIT.   3. TAKE ALL MEDICATIONS PRESCRIBED IN THE ER IF ANY ARE PRESCRIBED. CONTINUE YOUR HOME MEDICATIONS UNLESS OTHERWISE ADVISED DIFFERENTLY.   4. RETURN FOR WORSENING SYMPTOMS OR CONCERNS INCLUDING BUT NOT LIMITED TO FEVER, CHEST PAIN, OR TROUBLE BREATHING OR ANY OTHER CONCERNS  acyclovir 5 times daily  prednisone as prescribed lubricant eye drops  tape eye shut at night    Gayle Palsy    WHAT YOU NEED TO KNOW:    What is Bell palsy? Bell palsy is a sudden weakness or paralysis of one side of your face. It occurs when the nerve that controls the muscles in your face becomes swollen or irritated. Bell palsy usually lasts about 2 to 3 weeks, but it can last for up to 6 months. Bell palsy can be permanent for some people. The cause of Bell palsy is not clear.  Gayle Palsy    What increases my risk for Bell palsy?    Age 15 to 45 years    Pregnancy or preeclampsia (high blood pressure that develops because of pregnancy)    A virus, such as a cold, the flu, herpes simplex, or varicella (chicken pox)    Obesity    High blood pressure    Stress, lack of sleep, injury, or a short illness    A condition such as lupus, Lyme disease, Sjögren syndrome, or diabetes  What are the signs and symptoms of Bell palsy? You may first have pain behind an ear or in your face. Hours or days later, you may have any of the following on the same side of your face:    Weakness or paralysis in your face    Not being able to move your eyebrow or wrinkle your forehead    Trouble closing your eye or blinking, or your eye moves up when you try to close your eyelid    Changes in the amount of tears and saliva you make, such as dry eyes or drooling    Mouth drooping and trouble smiling or chewing    Loss of taste at the front part of your tongue    Sensitive hearing  How is Bell palsy diagnosed? Your healthcare provider will examine you and ask about your medical history. Tell your provider about your symptoms and when they started. Your provider will test how well you can move the muscles in your face. Your provider will need to rule out other causes of paralysis, such as a stroke. Some stroke and Bell palsy symptoms are similar, but only Bell palsy prevents movement of forehead muscles. Bell palsy only affects your face. You may also need any of the following:    An electromyography (EMG) may be used to measure the electrical activity of your muscles. An EMG also tests the nerves that control muscles.    A CT or MRI of your brain may be done to rule out other causes of your paralysis. You may be given contrast liquid to help your brain show up better in the pictures. Tell the healthcare provider if you have ever had an allergic reaction to contrast liquid. Do not enter the MRI room with anything metal. Metal can cause serious injury. Tell the provider if you have any metal in or on your body.  How is Bell palsy treated? Bell palsy often goes away without treatment. Some treatments may help you get better faster or help prevent other problems caused by Bell palsy. You may need any of the following:    Steroids may be given to decrease swelling and irritation of the nerve in your face. Your symptoms can go away faster if you get steroids 72 hours from when your paralysis started.    Antiviral medicine may be given if your provider thinks a virus caused your Bell palsy.    Acetaminophen decreases pain and fever. It is available without a doctor's order. Ask how much to take and how often to take it. Follow directions. Read the labels of all other medicines you are using to see if they also contain acetaminophen, or ask your doctor or pharmacist. Acetaminophen can cause liver damage if not taken correctly.    NSAIDs, such as ibuprofen, help decrease swelling, pain, and fever. This medicine is available with or without a doctor's order. NSAIDs can cause stomach bleeding or kidney problems in certain people. If you take blood thinner medicine, always ask your healthcare provider if NSAIDs are safe for you. Always read the medicine label and follow directions.  What else can I do to help manage Bell palsy?    Eye care may be needed to prevent vision changes, eye damage, and infection. Use eye drops during the day and an ointment at night, as directed. You may need to wear an eye patch during the day. Wear sunglasses to protect your eye from direct sunlight. Stay away from places that have particles in the air that may harm your eye. You may also need to tape your eye shut while you sleep. Get your eye checked as directed if your symptoms last longer than 3 weeks.  Eye Patch      Eat soft foods that are easy to chew and swallow. These foods may be chopped, ground, mashed, pureed, and moist. Do not eat hard or chewy foods. These foods may fall out of your mouth where it droops. Ask your healthcare provider or dietitian about the foods you should eat.    Go to speech therapy if you have trouble eating or drinking that continues longer than 3 weeks. A speech therapist can teach you new ways to eat and drink. The therapist can show you ways to prevent or manage problems with drooling or swallowing. You may also learn to plan several small meals instead of a few large meals each day.    Use ear plugs or ear protectors around loud noises, such as a lawnmower or loud music. Foam earplugs that completely block your ear canal can help decrease your sensitive hearing. Do not listen to loud music through headphones or earphones.    Go to physical therapy as directed. A physical therapist can teach you how to massage and exercise the muscles in your face. These exercises may help prevent long-term problems such as muscle spasms and permanent paralysis in your face.    Talk to a mental health therapist if your symptoms are causing a low mood or anxiety. The therapist can help you cope while you recover.  When should I seek immediate care?    You have vision changes or a loss of vision.    When should I call my doctor?    You have a fever.    Your eye becomes red, irritated, or painful.    Your symptoms have not gone away after 3 weeks.    You have questions or concerns about your condition or care.  CARE AGREEMENT:    You have the right to help plan your care. Learn about your health condition and how it may be treated. Discuss treatment options with your healthcare providers to decide what care you want to receive. You always have the right to refuse treatment.    © [employer] 1973, 2025    	  back to top            © [employer] 1973, 2025

## 2025-03-27 NOTE — ED PROVIDER NOTE - CCCP TRG CHIEF CMPLNT
Last office visit: 2/16/24   Last filled: 12/19/23 #90 with 0 RF   Last labs: 9/23/23     No future appointments.    Protocol:     Cholesterol Medication Protocol [id number]/17/2024 01:36 PM   Protocol Details ALT < 80    ALT resulted within past year    Lipid panel within past 12 months    In person appointment or virtual visit in the past 12 mos or appointment in next 3 mos      facial droop

## 2025-03-27 NOTE — ED ADULT TRIAGE NOTE - ARRIVAL FROM
Patients wife is calling to advise us that their insurance will not cover the recommended prescription of the antibiotic and rinse that Dr Reilly gave him to treat the infection.It would cost them 120.00 to get it without coverage and they don't have that. Could we provide another medication that Medicaid would cover ?   It is important to have this done today please call her back to confirm we are able to do so. Thank you.   Uses [facility], [place]    Home

## 2025-03-27 NOTE — ED PROVIDER NOTE - PATIENT PORTAL LINK FT
You can access the FollowMyHealth Patient Portal offered by [facility] by registering at the following website: [email or web address]. By joining [facility]’s FollowMyHealth portal, you will also be able to view your health information using other applications (apps) compatible with our system.

## 2025-03-27 NOTE — ED PROVIDER NOTE - OBJECTIVE STATEMENT
Patient is a 19-year-old female with past medical history of anxiety, ADHD, autism, migraines, vertigo presents with facial asymmetry.  Patient reports she noted around noon today that her smile was asymmetrical and she could not close her right eye well.  Patient denies ever having symptoms in the past.  Patient denies fever chest pain shortness of breath headache numbness

## 2025-03-27 NOTE — ED ADULT NURSE NOTE - CAS EDN DISCHARGE INTERVENTIONS
Pt seen and examined with NCC on 1/16/22.  Pt history as above and agree with resident evaluation and assessment.  PT with h/o AAA and Covid transferred from [place] after fall, found to have likely spontaneous right frontal IPH with superior mass effect on ventricle, no midline shift and mild surrounding vasogenic edema.  Pt noted with left hemiparesis.  Pt also placed on EEG monitoring which revealed 2 sz on evening 1/15.  Exam at time of visit was pt opening eyes to voice and able to keep them open, follows commands on right, difficult to understand speech, and left arm, with coaxing approx 4/5 and left leg 2-3/5.      Had long talk with Pt's daughter Kay and his brother (who speaks [place] and the daughter translated on a 3-way call, brother being pt's HCP).  We discussed pt's condition.  Daughter said that she understood her father's speech in [place], although he was confused.  We discussed that he is a borderline case for surgery and offered enrollment into the MIND clinical trial of endoscopic, STX-guided resection vs medical management.  After an full explanation of the study, the family expressed that they would not like pt to undergo surgery unless it was absolutely necessary and, if so, they would only like him to have the minimally invasive approach such as Marina.  We did clarify that if during such an approach, the patient was deemed to need intraoperative conversion to craniotomy, then that was acceptable.  Pt's brother, who is a physician, was very specific about this and we and family reiterated the plan and wishes of the family.  They did not want to enroll in MIND.  Pt will continue to be managed conservatively for now with hypertonic saline and continuous EEG monitoring. IV discontinued, cath removed intact

## 2025-03-27 NOTE — ED PROVIDER NOTE - CLINICAL SUMMARY MEDICAL DECISION MAKING FREE TEXT BOX
Here with right sided asymmetrical smile and difficulty closing right eye. differential diagnosis inclusive of Bell palsy v other peripheral nerve palsy, complex migraine, r/o CVA. check labs, CTs, likely will be treated as bell palsy with outpatient follow up. consider admission/neuro evaluation dependant on results.

## 2025-03-29 ENCOUNTER — EMERGENCY (EMERGENCY)
Facility: HOSPITAL | Age: 20
LOS: 1 days | Discharge: HOME | End: 2025-03-29
Attending: EMERGENCY MEDICINE | Admitting: EMERGENCY MEDICINE
Payer: MEDICAID

## 2025-03-29 VITALS
HEART RATE: 90 BPM | HEIGHT: 63 IN | RESPIRATION RATE: 15 BRPM | OXYGEN SATURATION: 97 % | TEMPERATURE: 98 F | WEIGHT: 130.07 LBS | DIASTOLIC BLOOD PRESSURE: 60 MMHG | SYSTOLIC BLOOD PRESSURE: 117 MMHG

## 2025-03-29 VITALS
SYSTOLIC BLOOD PRESSURE: 120 MMHG | RESPIRATION RATE: 14 BRPM | OXYGEN SATURATION: 100 % | TEMPERATURE: 99 F | HEART RATE: 89 BPM | DIASTOLIC BLOOD PRESSURE: 77 MMHG

## 2025-03-29 DIAGNOSIS — Z78.9 OTHER SPECIFIED HEALTH STATUS: Chronic | ICD-10-CM

## 2025-03-29 PROCEDURE — 99282 EMERGENCY DEPT VISIT SF MDM: CPT

## 2025-03-29 PROCEDURE — 99283 EMERGENCY DEPT VISIT LOW MDM: CPT

## 2025-03-29 NOTE — ED PROVIDER NOTE - CARE PROVIDER_API CALL
Chasity Art  Follow Up Time: 1-3 Days    YOUR NEUROLOGIST,   Phone: (   )    -  Fax: (   )    -  Follow Up Time: 1-3 Days    Darshan Silver  Neurology  [address] [place] [id number]  Phone: [phone]  Fax: [phone]  Follow Up Time: 1-3 Days

## 2025-03-29 NOTE — ED PROVIDER NOTE - NSFOLLOWUPINSTRUCTIONS_ED_ALL_ED_FT
Follow-up with your neurologist on Monday for reevaluation, ongoing care and treatment.  Continue taking acyclovir and prednisone as prescribed earlier.  Stay hydrated.  If having worsening symptoms or other related symptoms, return to the ER.    Paresthesia  Paresthesia is a burning or prickling feeling. This feeling can happen in any part of the body. It often happens in the hands, arms, legs, or feet. Usually, it is not painful. In most cases, the feeling goes away in a short time and is not a sign of a serious problem. If you have paresthesia that lasts a long time, you need to see your doctor.    Follow these instructions at home:  Nutrition    A plate with examples of foods in a healthy diet.  Eat a healthy diet. This includes:  Eating foods that are high in fiber. These include beans, whole grains, and fresh fruits and vegetables.  Limiting foods that are high in fat and sugar. These include fried or sweet foods.  Alcohol use    A sign showing that a person should not drink alcohol.  Do not drink alcohol if:  Your doctor tells you not to drink.  You are pregnant, may be pregnant, or are planning to become pregnant.  If you drink alcohol:  Limit how much you have to:  0–1 drink a day for women.  0–2 drinks a day for men.  Know how much alcohol is in your drink. In the [place] one drink equals one 12 oz bottle of beer (355 mL), one 5 oz glass of wine (148 mL), or one 1½ oz glass of hard liquor (44 mL).  General instructions    Take over-the-counter and prescription medicines only as told by your doctor.  Do not smoke or use any products that contain nicotine or tobacco. If you need help quitting, ask your doctor.  If you have diabetes, work with your doctor to make sure your blood sugar stays in a healthy range.  If your feet feel numb:  Check for redness, warmth, and swelling every day.  Wear padded socks and comfortable shoes. These help protect your feet.  Keep all follow-up visits.  Contact a doctor if:  You have paresthesia that gets worse or does not go away.  You lose feeling (have numbness) after an injury.  Your burning or prickling feeling gets worse when you walk.  You have pain or cramps.  You feel dizzy or you faint.  You have a rash.  Get help right away if:  You feel weak or have new weakness in an arm or leg.  You have trouble walking or moving.  You have problems speaking, understanding, or seeing.  You feel confused.  You cannot control when you pee (urinate) or poop (have a bowel movement).  These symptoms may be an emergency. Get help right away. Call 911.  Do not wait to see if the symptoms will go away.  Do not drive yourself to the hospital.  Summary  Paresthesia is a burning or prickling feeling. It often happens in the hands, arms, legs, or feet.  In most cases, the feeling goes away in a short time and is not a sign of a serious problem.  If you have paresthesia that lasts a long time, you need to be seen by your doctor.

## 2025-03-29 NOTE — ED PROVIDER NOTE - NEUROLOGICAL, MLM
Alert and oriented, CN II-XII intact except for mild right smile asymmetry, no motor or sensory deficits. Right upper and lower extremity are 5/5 motor and full sensation intact

## 2025-03-29 NOTE — ED PROVIDER NOTE - CARE PROVIDERS DIRECT ADDRESSES
[email or web address],[email or web address],DirectAddress_Unknown

## 2025-03-29 NOTE — ED PROVIDER NOTE - OBJECTIVE STATEMENT
19-year-old female with hx of anxiety, ADHD, autism spectrum, vertigo, migraines presents with c/o numbness to her body today. Pt states that she was seen in ED 2 days ago for right sided bell's palsy. Pt had full neuro workup including ct and cta brain which were negative, discharged home on prednisone and acyclovir, advised neurology follow up. Pt has her own neurologist but has not made an appointment yet. States that she is very concerned about her bell's palsy and feels numbness in the body and possibly weakness on the right side. Denies headache, n/v, vision changes, speech or gait changes, chest pain, difficulty breathing or other symptoms.

## 2025-03-29 NOTE — ED ADULT NURSE NOTE - PRO INTERPRETER NEED 2
Controlled  No meds  No changes  
Has seen rheum  Has no treatment  
Monitor: The problem is unchanged.  Evaluation: Reviewed recent labs/tests with the patient.  Assessment/Treatment:  Follow up per specialist managing the condition.  
Reviewed labs  At baseline  
Reviewed labs with pt    Pneumonia vaccines:  Up to date    Shingles vaccines:  Up to date    Colonoscopy:  Up to date    Had the covid and flu vaccine    
Sees dr castillo  On meds  
Sees urology  Hg was high  Encouraged to f/u with gluckman  
Uses cpap nightly    
migth just be due to the low albumin  Recheck today  
English

## 2025-03-29 NOTE — ED PROVIDER NOTE - CLINICAL SUMMARY MEDICAL DECISION MAKING FREE TEXT BOX
no
19-year-old female with history of autism ADHD vertigo was seen in ER 2 days ago for right-sided Bell's palsy.  Had full neurowork-up including CT and CTA brain which were negative.  Patient was prescribed acyclovir and prednisone for Bell's palsy and told to follow-up with neurology.  Today states she is very concerned about her Bell's palsy and feels numbness in the body and possibly weakness on the right side.  Denies headache visual disturbance difficulty with speech or gait.  Physical exam vital signs stable afebrile no distress.  Head exam reveals mild right facial droop.  Pupils equal round reactive to light extraocular muscles intact.  Visual fields and visual acuity intact.  Neck is supple.  Heart and lungs normal.  Abdomen soft nontender.  Extremities full range of motion pulses and sensation intact throughout.  Neuro exam awake and alert.  Cranial nerves II through XII intact except for right facial.  There are no other sensory or motor deficits.  Specifically the right upper and lower extremity are 5/5 motor and full sensation intact.  Impression Bell's palsy with paresthesias possibly medication related.  Plan will encourage patient to finish course of antivirals and steroids.  Follow-up with her neurologist on Monday as discussed.

## 2025-03-29 NOTE — ED PROVIDER NOTE - PROGRESS NOTE DETAILS
Pt with no neuro deficits except for mild right smile asymmetry and difficulty full closing right eye secondary to bell's palsy on exam, ambulatory with steady gait in ED. VSS, advised continue meds and f/u with her neurologist on Monday.

## 2025-03-29 NOTE — ED PROVIDER NOTE - PATIENT PORTAL LINK FT
You can access the FollowMyHealth Patient Portal offered by [facility] by registering at the following website: [email or web address]. By joining [facility]’s FollowMyHealth portal, you will also be able to view your health information using other applications (apps) compatible with our system.

## 2025-03-29 NOTE — ED PROVIDER NOTE - EYES, MLM
Clear bilaterally, pupils equal, round and reactive to light. EOMI, mild difficulty closing right eye

## 2025-03-29 NOTE — ED PROVIDER NOTE - PROVIDER TOKENS
PROVIDER:[TOKEN:[[id number]],FOLLOWUP:[1-3 Days]],FREE:[LAST:[YOUR NEUROLOGIST],PHONE:[(   )    -],FAX:[(   )    -],FOLLOWUP:[1-3 Days]],PROVIDER:[TOKEN:[[id number]],FOLLOWUP:[1-3 Days]]

## 2025-03-31 ENCOUNTER — EMERGENCY (EMERGENCY)
Facility: HOSPITAL | Age: 20
LOS: 1 days | Discharge: HOME | End: 2025-03-31
Attending: EMERGENCY MEDICINE | Admitting: EMERGENCY MEDICINE
Payer: MEDICAID

## 2025-03-31 VITALS
DIASTOLIC BLOOD PRESSURE: 68 MMHG | OXYGEN SATURATION: 99 % | HEIGHT: 63 IN | RESPIRATION RATE: 18 BRPM | SYSTOLIC BLOOD PRESSURE: 114 MMHG | WEIGHT: 130.07 LBS | HEART RATE: 85 BPM | TEMPERATURE: 99 F

## 2025-03-31 DIAGNOSIS — Z78.9 OTHER SPECIFIED HEALTH STATUS: Chronic | ICD-10-CM

## 2025-03-31 PROCEDURE — 99283 EMERGENCY DEPT VISIT LOW MDM: CPT

## 2025-03-31 PROCEDURE — 99282 EMERGENCY DEPT VISIT SF MDM: CPT

## 2025-03-31 NOTE — ED ADULT NURSE NOTE - NS ED NURSE DISCH DISPOSITION
MAIN BRASWELL  MRN-[id number]  Patient is a 72y old  Male who presents with a chief complaint of lower extremity edema (10 Sep 2020 11:33)    HPI:  71M w/ PMHx of HFrEF (EF 10%) s/p ICD, Afib w/ recent admission for CHF/afib s/p DCCV, CKD, DM2, hypothyroidism presents after being referred from CHF clinic for admission due to worsening of LE edema and failure of PO diuretics at home. Per patient was relatively functional until ~1 mo ago when he developed palpitations and light headedness. He was found to be hypotensive and in Afib w/ RVR resulting in his hospitalization at the end  of July. His course was c/b EDIE on CKD and persistent hypotension. He improved after dccv and was ultimately discharged home off entresto, coreg and diuretics due to c/f worsening hypotension. After discharge, patient notes worsening LE edema. He was instructed to resume lasix, which was ultimately escalated to toresemide 80mg bid w/ metolazone 2.5 mg daily. With this regimen he noted increased urination and some mild improvement. However, due to ongoing reduced functional capacity, fatigue, and ALCAZAR he was referred for admission for IV diuretics. Denies any cp or palpitations. Denies repaid HR. Notes weight gain since discharge. Continues to have poor appetite and reduced exercise capacity. No fevers, chills. Notes chronic nonproductive cough which is unchanged. Sleeps elevated due to back pain, denies significant orthopnea. Is able to complete ADLs at baseline with some assistance from his niece. Notes increased urination with high dose diuretics, but no dysuria or hematuria. No abd pain, nausea, vomiting. No diarrhea. (21 Aug 2020 09:51)      24 HOUR EVENTS:    REVIEW OF SYSTEMS:    CONSTITUTIONAL: No weakness, fevers or chills  EYES/ENT: No visual changes;  No vertigo or throat pain   NECK: No pain or stiffness  RESPIRATORY: No cough, wheezing, hemoptysis; No shortness of breath  CARDIOVASCULAR: No chest pain or palpitations  GASTROINTESTINAL: No abdominal or epigastric pain. No nausea, vomiting, or hematemesis; No diarrhea or constipation. No melena or hematochezia.  GENITOURINARY: No dysuria, frequency or hematuria  NEUROLOGICAL: No numbness or weakness  SKIN: No itching, rashes      ICU Vital Signs Last 24 Hrs  T(C): 36.7 (11 Sep 2020 06:00), Max: 36.7 (10 Sep 2020 16:27)  T(F): 98 (11 Sep 2020 06:00), Max: 98.1 (10 Sep 2020 16:27)  HR: 80 (11 Sep 2020 06:00) (80 - 83)  BP: 102/58 (11 Sep 2020 06:00) (83/23 - 113/60)  BP(mean): 76 (11 Sep 2020 06:00) (53 - 83)  ABP: --  ABP(mean): --  RR: 18 (11 Sep 2020 06:) (7 - 38)  SpO2: 99% (11 Sep 2020 06:00) (92% - 100%)      CVP(mm Hg): 5 (20 @ 06:00) (0 - 9)  CO: --  CI: --  PA: 58/22 (20 @ 06:00) (38/13 - 66/26)  PA(mean): 37 (20 @ 06:00) (22 - 44)  PA(direct): --  PCWP: --  LA: --  RA: --  SVR: --  SVRI: --  PVR: --  PVRI: --  I&O's Summary    09 Sep 2020 07:  -  10 Sep 2020 07:00  --------------------------------------------------------  IN: 960 mL / OUT: 3450 mL / NET: -2490 mL    10 Sep 2020 07:  -  11 Sep 2020 06:48  --------------------------------------------------------  IN: 766 mL / OUT: 2670 mL / NET: -1904 mL        CAPILLARY BLOOD GLUCOSE    CAPILLARY BLOOD GLUCOSE      POCT Blood Glucose.: 224 mg/dL (10 Sep 2020 12:01)      PHYSICAL EXAM:  GENERAL: No acute distress, well-developed  HEAD:  Atraumatic, Normocephalic  EYES: EOMI, PERRLA, conjunctiva and sclera clear  NECK: Supple, no lymphadenopathy, no JVD  CHEST/LUNG: CTAB; No wheezes, rales, or rhonchi  HEART: Regular rate and rhythm. Normal S1/S2. No murmurs, rubs, or gallops  ABDOMEN: Soft, non-tender, non-distended; normal bowel sounds, no organomegaly  EXTREMITIES:  2+ peripheral pulses b/l, No clubbing, cyanosis, or edema  NEUROLOGY: A&O x 3, no focal deficits  SKIN: No rashes or lesions    ============================I/O===========================   I&O's Detail    09 Sep 2020 07:01  -  10 Sep 2020 07:00  --------------------------------------------------------  IN:    Oral Fluid: 960 mL  Total IN: 960 mL    OUT:    Voided: 3450 mL  Total OUT: 3450 mL    Total NET: -2490 mL      10 Sep 2020 07:01  -  11 Sep 2020 06:48  --------------------------------------------------------  IN:    heparin  Infusion.: 110 mL    milrinone  Infusion: 56 mL    Oral Fluid: 600 mL  Total IN: 766 mL    OUT:    Voided: 2670 mL  Total OUT: 2670 mL    Total NET: -1904 mL        ============================ LABS =========================                        8.9    4.65  )-----------( 130      ( 11 Sep 2020 02:52 )             27.9         134<L>  |  96  |  123<H>  ----------------------------<  199<H>  4.1   |  23  |  2.89<H>    Ca    9.7      11 Sep 2020 02:52  Phos  4.9       Mg     2.3         TPro  6.3  /  Alb  3.9  /  TBili  0.9  /  DBili  x   /  AST  46<H>  /  ALT  59<H>  /  AlkPhos  67                  LIVER FUNCTIONS - ( 11 Sep 2020 02:52 )  Alb: 3.9 g/dL / Pro: 6.3 g/dL / ALK PHOS: 67 U/L / ALT: 59 U/L / AST: 46 U/L / GGT: x           PT/INR - ( 11 Sep 2020 02:52 )   PT: 13.3 sec;   INR: 1.12 ratio         PTT - ( 11 Sep 2020 02:52 )  PTT:65.3 sec    Lactate, Blood: 0.7 mmol/L (20 @ 02:52)      ======================Micro/Rad/Cardio=================  Telemtry: Reviewed   EKG: Reviewed  CXR: Reviewed  Culture: Reviewed   Echo:   Cath: Cardiac Cath Lab - Adult:   [facility]  Department of Cardiology  [address] [place] [address]  [phone]  Cath Lab Report -- Comprehensive Report  Patient: MAIN BRASWELL  Study date: 2020  Account number: [id number]  MR number: [id number]  : 1948  Gender: Male  Race: W  Case Physician(s):  Nadeem Jay M.D.  Referring Physician:  Mane Chacon M.D.  INDICATIONS: Acute systolic congestive heart failure.  HISTORY: There was a prior diagnosis of congestive heart failure. The  patient has hypertension.  PROCEDURE:  --  Right heart catheterization.  --  Sonosite - Diagnostic.  TECHNIQUE: The risks and alternatives of the procedures and conscious  sedation were explained to the patient and informed consent was obtained.  Cardiac catheterization performed electively.  Local anesthetic given. Right internal jugular vein access. Right heart  catheterization. The procedure was performed utilizing a catheter.  Sonosite - Diagnostic. RADIATION EXPOSURE: 0.4 min.  MEDICATIONS GIVEN: Fentanyl, 25 mcg, IV. Midazolam, 1 mg, IV.  COMPLICATIONS: There were no complications.  DIAGNOSTIC RECOMMENDATIONS: The patient should continue with the present  medications.  Prepared and signed by  Nadeem Jay M.D.  Signed 2020 08:22:42  HEMODYNAMIC TABLES  Pressures:  Baseline  Pressures:  - HR: 64  Pressures:  - Rhythm:  Pressures:  -- Pulmonary Artery (S/D/M): 58/21/38  Pressures:  -- Pulmonary Capillary Wedge: 26//28  Pressures:  -- Right Atrium (a/v/M): 12/12/9  Pressures:  -- Right Ventricle (s/edp): 57/10/--  O2 Sats:  Baseline  O2 Sats:  - HR: 64  O2 Sats:  - Rhythm:  O2 Sats:  -- AO: 9.7/92/12.14  O2 Sats:  -- PA: 9.7/50.2/6.62  Outputs:  Baseline  Outputs:  -- CALCULATIONS[age]  Outputs:  -- CALCULATIONS: Body Surface Area: 1.91  Outputs:  -- CALCULATIONS: Height in cm: 176.00  Outputs:  -- CALCULATIONS: Sex: Male  Outputs:  -- CALCULATIONS: Weight in k.10  Outputs:  -- OUTPUTS: CO by Corey: 4.64  Outputs:  -- OUTPUTS: Corey cardiac index: 2.43  Outputs:  -- OUTPUTS: O2 consumption: 256.00  Outputs:  -- OUTPUTS: Vo2 Indexed: 133.88  Outputs:  -- RESISTANCES: Pulmonary vascular index (dsc): 329.41  Outputs:  -- RESISTANCES: Pulmonary vascular index (Wood Units): 4.12  Outputs:  --RESISTANCES: Pulmonary vascular resistance (dsc): 172.28  Outputs:  -- RESISTANCES: Pulmonary vascular resistance (Wood Units): 2.15  Outputs:  -- RESISTANCES: Right ventricular stroke work: 25.79  Outputs:  -- RESISTANCES: Right ventricular stroke work index: 13.49  Outputs:  -- RESISTANCES: Total pulmonary index (dsc): 1251.76  Outputs:  -- RESISTANCES: Total pulmonary index (Wood Units): 15.65  Outputs:  -- RESISTANCES: Total pulmonary resistance (dsc): 654.65  Outputs:  -- RESISTANCES: Total pulmonary resistance (Wood Units): 8.19  Outputs:  -- SHUNTS: Pulmonary flow: 4.64  Outputs:  -- SHUNTS: Qp Indexed: 2.43  Outputs:  -- SHUNTS: Qs Indexed: 2.43  Outputs:  -- SHUNTS: Systemic flow: 4.64 (20 @ 15:56)    ======================================================  PAST MEDICAL & SURGICAL HISTORY:  Hypothyroidism  Afib  Type II diabetes mellitus  Aortic insufficiency  Mitral insufficiency  CKD (chronic kidney disease)  Cardiomyopathy: Systolic CHF  Hypertension  History of tonsillectomy: childhood  AICD (automatic cardioverter/defibrillator) present: 2012 MAIN BRASWELL  MRN-[id number]  Patient is a 72y old  Male who presents with a chief complaint of lower extremity edema (10 Sep 2020 11:33)    HPI: 71M w/ PMHx of HFrEF (EF 10%) s/p ICD, Afib w/ recent admission for CHF/afib s/p DCCV, CKD, DM2, hypothyroidism presents after being referred from CHF clinic for admission due to worsening of LE edema and failure of PO diuretics at home. Per patient was relatively functional until ~1 mo ago when he developed palpitations and light headedness. He was found to be hypotensive and in Afib w/ RVR resulting in his hospitalization at the end  of July. His course was c/b EDIE on CKD and persistent hypotension. He improved after dccv and was ultimately discharged home off entresto, coreg and diuretics due to c/f worsening hypotension. After discharge, patient notes worsening LE edema. He was instructed to resume lasix, which was ultimately escalated to toresemide 80mg bid w/ metolazone 2.5 mg daily. With this regimen he noted increased urination and some mild improvement. However, due to ongoing reduced functional capacity, fatigue, and ALCAZAR he was referred for admission for IV diuretics. Denies any cp or palpitations. Denies repaid HR. Notes weight gain since discharge. Continues to have poor appetite and reduced exercise capacity. No fevers, chills. Notes chronic nonproductive cough which is unchanged. Sleeps elevated due to back pain, denies significant orthopnea. Is able to complete ADLs at baseline with some assistance from his niece. Notes increased urination with high dose diuretics, but no dysuria or hematuria. No abd pain, nausea, vomiting. No diarrhea. (21 Aug 2020 09:51)    Overnight: No active issues     REVIEW OF SYSTEMS:  CONSTITUTIONAL: No weakness, fevers or chills  EYES/ENT: No visual changes;  No vertigo or throat pain   NECK: No pain or stiffness  RESPIRATORY: No cough, wheezing, hemoptysis; No shortness of breath  CARDIOVASCULAR: No chest pain or palpitations  GASTROINTESTINAL: No abdominal or epigastric pain. No nausea, vomiting, or hematemesis; No diarrhea or constipation. No melena or hematochezia.  GENITOURINARY: No dysuria, frequency or hematuria  NEUROLOGICAL: No numbness or weakness  SKIN: No itching, rashes    ICU Vital Signs Last 24 Hrs  T(C): 36.7 (11 Sep 2020 06:00), Max: 36.7 (10 Sep 2020 16:27)  T(F): 98 (11 Sep 2020 06:), Max: 98.1 (10 Sep 2020 16:27)  HR: 80 (11 Sep 2020 06:00) (80 - 83)  BP: 102/58 (11 Sep 2020 06:00) (83/23 - 113/60)  BP(mean): 76 (11 Sep 2020 06:) (53 - 83)  RR: 18 (11 Sep 2020 06:) (7 - 38)  SpO2: 99% (11 Sep 2020 06:) (92% - 100%)      CVP(mm Hg): 5 (20 @ 06:00) (0 - 9)  PA: 58/22 (20 @ 06:00) (38/13 - 66/26)  PA(mean): 37 (20 @ 06:00) (22 - 44)      I&O's Summary    09 Sep 2020 07:01  -  10 Sep 2020 07:00  --------------------------------------------------------  IN: 960 mL / OUT: 3450 mL / NET: -2490 mL    10 Sep 2020 07:  -  11 Sep 2020 06:48  --------------------------------------------------------  IN: 766 mL / OUT: 2670 mL / NET: -1904 mL      CAPILLARY BLOOD GLUCOSE  POCT Blood Glucose.: 224 mg/dL (10 Sep 2020 12:01)    PHYSICAL EXAM:  GENERAL: No acute distress, well-developed  HEAD:  Atraumatic, Normocephalic  EYES: EOMI, PERRLA, conjunctiva and sclera clear  NECK: Supple, no lymphadenopathy, no JVD  CHEST/LUNG: CTAB; No wheezes, rales, or rhonchi  HEART: Paced, +systolic murmur, no rubs or gallops  ABDOMEN: Soft, non-tender, non-distended; normal bowel sounds, no organomegaly  EXTREMITIES:  2+ peripheral pulses b/l, No clubbing, cyanosis, or edema  NEUROLOGY: A&O x 3, no focal deficits  SKIN: No rashes or lesions    ============================I/O===========================   I&O's Detail    09 Sep 2020 07:01  -  10 Sep 2020 07:00  --------------------------------------------------------  IN:    Oral Fluid: 960 mL  Total IN: 960 mL    OUT:    Voided: 3450 mL  Total OUT: 3450 mL    Total NET: -2490 mL      10 Sep 2020 07:  -  11 Sep 2020 06:48  --------------------------------------------------------  IN:    heparin  Infusion.: 110 mL    milrinone  Infusion: 56 mL    Oral Fluid: 600 mL  Total IN: 766 mL    OUT:    Voided: 2670 mL  Total OUT: 2670 mL    Total NET: -1904 mL    ============================ LABS =========================                8.9    4.65  )-----------( 130      ( 11 Sep 2020 02:52 )             27.9     09-11    134<L>  |  96  |  123<H>  ----------------------------<  199<H>  4.1   |  23  |  2.89<H>    Ca    9.7      11 Sep 2020 02:52  Phos  4.9       Mg     2.3         TPro  6.3  /  Alb  3.9  /  TBili  0.9  /  DBili  x   /  AST  46<H>  /  ALT  59<H>  /  AlkPhos  67  11      LIVER FUNCTIONS - ( 11 Sep 2020 02:52 )  Alb: 3.9 g/dL / Pro: 6.3 g/dL / ALK PHOS: 67 U/L / ALT: 59 U/L / AST: 46 U/L / GGT: x           PT/INR - ( 11 Sep 2020 02:52 )   PT: 13.3 sec;   INR: 1.12 ratio    PTT - ( 11 Sep 2020 02:52 )  PTT:65.3 sec    Lactate, Blood: 0.7 mmol/L (20 @ 02:52)    ======================Micro/Rad/Cardio=================  Telemtry: Reviewed   EKG: Reviewed  CXR: Reviewed  Culture: Reviewed   Echo:   Cath: Cardiac Cath Lab - Adult:   [facility]  Department of Cardiology  [address] [place] [address]  [phone]  Cath Lab Report -- Comprehensive Report  Patient: MAIN BRASWELL  Study date: 2020  Account number: [id number]  MR number: [id number]  : 1948  Gender: Male  Race: W  Case Physician(s):  Nadeem Jay M.D.  Referring Physician:  Mane Chacon M.D.  INDICATIONS: Acute systolic congestive heart failure.  HISTORY: There was a prior diagnosis of congestive heart failure. The  patient has hypertension.  PROCEDURE:  --  Right heart catheterization.  --  Sonosite - Diagnostic.  TECHNIQUE: The risks and alternatives of the procedures and conscious  sedation were explained to the patient and informed consent was obtained.  Cardiac catheterization performed electively.  Local anesthetic given. Right internal jugular vein access. Right heart  catheterization. The procedure was performed utilizing a catheter.  Sonosite - Diagnostic. RADIATION EXPOSURE: 0.4 min.  MEDICATIONS GIVEN: Fentanyl, 25 mcg, IV. Midazolam, 1 mg, IV.  COMPLICATIONS: There were no complications.  DIAGNOSTIC RECOMMENDATIONS: The patient should continue with the present  medications.  Prepared and signed by  Nadeem Jay M.D.  Signed 2020 08:22:42  HEMODYNAMIC TABLES  Pressures:  Baseline  Pressures:  - HR: 64  Pressures:  - Rhythm:  Pressures:  -- Pulmonary Artery (S/D/M): 58/21/38  Pressures:  -- Pulmonary Capillary Wedge: 26//28  Pressures:  -- Right Atrium (a/v/M): 12/12/9  Pressures:  -- Right Ventricle (s/edp): 57/10/--  O2 Sats:  Baseline  O2 Sats:  - HR: 64  O2 Sats:  - Rhythm:  O2 Sats:  -- AO: 9.7/92/12.14  O2 Sats:  -- PA: 9.7/50.2/6.62  Outputs:  Baseline  Outputs:  -- CALCULATIONS[age]  Outputs:  -- CALCULATIONS: Body Surface Area: 1.91  Outputs:  -- CALCULATIONS: Height in cm: 176.00  Outputs:  -- CALCULATIONS: Sex: Male  Outputs:  -- CALCULATIONS: Weight in k.10  Outputs:  -- OUTPUTS: CO by Corey: 4.64  Outputs:  -- OUTPUTS: Corey cardiac index: 2.43  Outputs:  -- OUTPUTS: O2 consumption: 256.00  Outputs:  -- OUTPUTS: Vo2 Indexed: 133.88  Outputs:  -- RESISTANCES: Pulmonary vascular index (dsc): 329.41  Outputs:  -- RESISTANCES: Pulmonary vascular index (Wood Units): 4.12  Outputs:  --RESISTANCES: Pulmonary vascular resistance (dsc): 172.28  Outputs:  -- RESISTANCES: Pulmonary vascular resistance (Wood Units): 2.15  Outputs:  -- RESISTANCES: Right ventricular stroke work: 25.79  Outputs:  -- RESISTANCES: Right ventricular stroke work index: 13.49  Outputs:  -- RESISTANCES: Total pulmonary index (dsc): 1251.76  Outputs:  -- RESISTANCES: Total pulmonary index (Wood Units): 15.65  Outputs:  -- RESISTANCES: Total pulmonary resistance (dsc): 654.65  Outputs:  -- RESISTANCES: Total pulmonary resistance (Wood Units): 8.19  Outputs:  -- SHUNTS: Pulmonary flow: 4.64  Outputs:  -- SHUNTS: Qp Indexed: 2.43  Outputs:  -- SHUNTS: Qs Indexed: 2.43  Outputs:  -- SHUNTS: Systemic flow: 4.64 (09-02-20 @ 15:56)    ======================================================  PAST MEDICAL & SURGICAL HISTORY:  Hypothyroidism  Afib  Type II diabetes mellitus  Aortic insufficiency  Mitral insufficiency  CKD (chronic kidney disease)  Cardiomyopathy: Systolic CHF  Hypertension  History of tonsillectomy: childhood  AICD (automatic cardioverter/defibrillator) present: 2012 Discharged

## 2025-03-31 NOTE — ED PROVIDER NOTE - DIFFERENTIAL DIAGNOSIS
Differential Diagnosis Differentials include but not limited to accidental drug ingestion, non-toxic ingestion.  No evidence of burn

## 2025-03-31 NOTE — ED PROVIDER NOTE - PATIENT PORTAL LINK FT
You can access the FollowMyHealth Patient Portal offered by [facility] by registering at the following website: [email or web address]. By joining [facility]’s FollowMyHealth portal, you will also be able to view your health information using other applications (apps) compatible with our system.

## 2025-03-31 NOTE — ED ADULT NURSE NOTE - OBJECTIVE STATEMENT
Pt comes in ambulatory stated that she took a pill from her hand that she previously rubbed with essential oil . Pt denies any vomiting,  abdominal pain , diarrhea , fever or chills.

## 2025-03-31 NOTE — ED PROVIDER NOTE - NS ED MD DISPO DISCHARGE CCDA
04/25/17 0724   Patient Assessment/Suction   Level of Consciousness (AVPU) alert   All Lung Fields Breath Sounds diminished   Cough Type good;nonproductive   PRE-TX-O2-ETCO2   O2 Device (Oxygen Therapy) nasal cannula   $ Is the patient on Oxygen? Yes   Flow (L/min) 2   SpO2 96 %   Pulse Oximetry Type Intermittent   $ Pulse Oximetry - Multiple Charge Pulse Oximetry - Multiple   Pulse 92   Resp 18   Aerosol Therapy   $ Aerosol Therapy Charges Aerosol Treatment   Respiratory Treatment Status given   SVN/Inhaler Treatment Route mask   Position During Treatment HOB at 45 degrees   Patient Tolerance good   Inhaler   $ Inhaler Charges MDI (Metered Dose Inahler) Treatment   Respiratory Treatment Status given   SVN/Inhaler Treatment Route mouthpiece   Patient Tolerance good   Post-Treatment   Post-treatment Heart Rate (beats/min) 93   Post-treatment Resp Rate (breaths/min) 16   All Fields Breath Sounds aeration increased   Duoneb Q6, Breo QD, NC >92, vitals as charted.   Patient/Caregiver provided printed discharge information.

## 2025-03-31 NOTE — ED PROVIDER NOTE - OBJECTIVE STATEMENT
19-year-old female with history of vertigo, migraines, ADHD, autism, and anxiety presents emergency room for accidental ingestion of small amount of essential oil blend.  Patient was diagnosed with Bell's palsy 5 days ago.  Has been taking antivirals and steroids.  States she ordered an essential oil blend facial serum to help with the Bell's palsy.  Put it on her face.  Then was taking her oral steroids and the pill touched the hand that had the serum on it and then put it in her mouth.  Felt small amount of burning inside her mouth.  Bleeding has now resolved.  States she looked on the Internet and was concerned about ingestion.  Denies any current pain or symptoms at this time  PCP Chasity Art

## 2025-03-31 NOTE — ED PROVIDER NOTE - CLINICAL SUMMARY MEDICAL DECISION MAKING FREE TEXT BOX
19-year-old female with a history of autism, anxiety, ADHD presents with was using essential oils for her face, when she accidentally licked  her finger.  She has some slight burning in her mouth after this happened, but that is resolved.  No other acute symptoms.  No fever or chills.  No nausea or vomiting.  No dysphagia/odynophagia.  No aggravating or alleviating factors otherwise noted.  No other acute complaints.    Exam: Nontoxic, well-appearing.  Normal respiratory effort.  CTA BL, no W/R/R.  Normal cardiac exam.  Abdomen soft, nontender, nondistended.  No facial or mouth lesions.  Normal mucous membranes of the mouth.  Normal tongue.  No other acute findings on exam.  Possible minimal ingestion of an essential oil, without any sequela.  Patient will follow-up with her primary care doctor for further workup and evaluation.

## 2025-03-31 NOTE — ED PROVIDER NOTE - CPE EDP SKIN NORM
68 y.o. male with past medical history significant for CAD, h/o MI, s/p CABG, s/p stent placement x 3, CHF, abdominal aneurysm, pre-COPD, pre-diabetes, arthritis, who presents ambulatory to the ED, with chief complaint of cough and chest congestion. Pt complains of shortness of breath, wheezing, cough, and chest congestion that all started on Monday (2/11/19). He states that he has been treating himself with Prednisone 10 mg every day since Monday without significant relief. Pt notes that he has an ANORO inhaler, but denies having a rescue inhaler. Pt specifically denies fever, nausea, vomiting or diarrhea. There are no other acute medical concerns at this time. Social hx: Positive for Tobacco use (current every day smoker, 1.5 packs/day for 40 years); Positive for EtOH use; 
PCP: Jakub Linder MD 
 
Note written by Santa Alvarado, as dictated by Brian Perez MD 8:44 PM 
 
 
The history is provided by the patient and medical records. No  was used. Past Medical History:  
Diagnosis Date  Aneurysm ([facility] [address]   
 abdominal  
 Arthritis  CAD (coronary artery disease)   
 heart attack  Heart failure ([facility] [address] CHF  Ill-defined condition 12/2017  
 nuclear stress test - led to catherization being done  Ill-defined condition   
 pre-COPD  Ill-defined condition   
 pre-diabetes Past Surgical History:  
Procedure Laterality Date  CARDIAC SURG PROCEDURE UNLIST  4/5/13 CABG  
[address], 2006, 2008  
 stents  COLONOSCOPY N/A 1/8/2018 COLONOSCOPY performed by Fabrizio Carnes. Rhoda Daniels MD at [address] [place] [address]  12/2017  
 abdominal aneurysm  HX HERNIA REPAIR    
 HX ORTHOPAEDIC    
 neck  HX ORTHOPAEDIC    
 left knee arthroscopic surgery  HX ORTHOPAEDIC    
 right foot Pascal's neuroma  HX TONSILLECTOMY Family History:  
Problem Relation Age of Onset [facility] Arthritis-osteo Mother  Asthma Mother  Diabetes Mother  Elevated Lipids Mother  Heart Disease Mother  Hypertension Mother  Heart Disease Father  Arthritis-osteo Sister  Diabetes Sister  Elevated Lipids Sister  Heart Disease Maternal Grandmother  Heart Disease Maternal Grandfather  Stroke Maternal Grandfather  Heart Disease Paternal Grandmother  Heart Disease Paternal Grandfather Social History Socioeconomic History  Marital status: SINGLE Spouse name: Not on file  Number of children: Not on file  Years of education: Not on file  Highest education level: Not on file Social Needs  Financial resource strain: Not on file  Food insecurity - worry: Not on file  Food insecurity - inability: Not on file  Transportation needs - medical: Not on file  Transportation needs - non-medical: Not on file Occupational History  Not on file Tobacco Use  Smoking status: Current Every Day Smoker Packs/day: 1.50 Years: 40.00 Pack years: 60.00 Last attempt to quit: 2013 Years since quittin.8  Smokeless tobacco: Never Used  Tobacco comment: started smoking again Substance and Sexual Activity  Alcohol use: Yes Comment: drinks everything, once a week on weekend - less than this per pt on 2017  Drug use: Not on file  Sexual activity: Not on file Other Topics Concern  Not on file Social History Narrative  Not on file ALLERGIES: Atorvastatin and Penicillin g Review of Systems Constitutional: Negative for chills and fever. HENT: Positive for congestion. Respiratory: Positive for cough, shortness of breath and wheezing. Cardiovascular: Negative for chest pain. Gastrointestinal: Negative for abdominal pain, constipation, diarrhea, nausea and vomiting. Neurological: Negative for dizziness and light-headedness. All other systems reviewed and are negative. Vitals: 02/15/19 1819 02/15/19 1948 Pulse: (!) 112 96 Resp:  16 Temp:  100 °F (37.8 °C) SpO2: 93% 91% Weight:  79 kg (174 lb 3.2 oz) Height:  5' 11\" (1.803 m) Physical Exam  
Constitutional: He appears well-developed and well-nourished. No distress. HENT:  
Head: Normocephalic and atraumatic. Eyes: Conjunctivae are normal. Pupils are equal, round, and reactive to light. Neck: Normal range of motion. Cardiovascular: Normal rate and regular rhythm. Pulmonary/Chest: Effort normal. He has wheezes (expiratory). Prolonged expiratory phase. Abdominal: Soft. He exhibits no distension. There is no tenderness. Musculoskeletal: Normal range of motion. Neurological: He is alert. Skin: Skin is dry. Capillary refill takes less than 2 seconds. Nursing note and vitals reviewed. Note written by Santa Wilburn, as dictated by Jaelyn Barrera MD 8:44 PM 
 
MDM Number of Diagnoses or Management Options Acute bronchitis, unspecified organism:  
Diagnosis management comments: DDX: PNA, PTX, bronchitis Procedures PROGRESS NOTE: 
9:40 PM 
Patient states that he feels \"a little\" better after first breathing treatment. He still exhibits expiratory wheezes and prolonged expiratory phase. Will order additional albuterol. PROGRESS NOTE: 
11:01 PM 
Patient was reevaluated at this time. His breathing is better after albuterol, but he is now tachycardic and shaky. 11:31 PM 
Change of shift. Care of patient signed over to Dr. Mariana Rush MD.  Bedside handoff complete. normal...

## 2025-03-31 NOTE — ED ADULT TRIAGE NOTE - CHIEF COMPLAINT QUOTE
" I rub essential oil on my hand and I took my steroid pill and tested " " I rubbed essential oil on my hand and I took my steroid pill and tested the essential oil "

## 2025-03-31 NOTE — ED PROVIDER NOTE - ENMT, MLM
Airway patent, Throat has no vesicles, no oropharyngeal exudates and uvula is midline. no mucus membrane redness or irritation

## 2025-03-31 NOTE — ED PROVIDER NOTE - NSFOLLOWUPINSTRUCTIONS_ED_ALL_ED_FT
drink plenty of fluids  follow up with primary care provider     Nontoxic Ingestion, Adult  Nontoxic ingestion happens when a person swallows, or ingests, a substance that is not likely to cause serious medical problems (nontoxic substance). Nontoxic ingestion involves a substance that is not food and is not poisonous. Some nontoxic substances can cause harm if a large enough amount is ingested.    Commonly ingested nontoxic substances include:  Chalk, ink, pencils, and water-based paint.  Birth control (contraceptive)pills and vitamins without iron.  Dog or cat food.  Lipstick or other makeup, perfume, and small amounts of non-medicated hair products.  Petroleum jelly and other gels or creams used on skin (topical ointments).  Potting soil, dirt, or insects.  Shaving cream and talcum powder.  Call your local poison control center at 1-516.554.6425 if you are not sure whether the substance that you swallowed is nontoxic or toxic. If you develop severe symptoms, get help right away.    What are the causes?  Most of the time, nontoxic ingestion is accidental. For example, some hairspray or makeup may get in your mouth by accident, or you may mistake a birth control pill for another medicine.    What are the signs or symptoms?  Nontoxic ingestion usually does not cause symptoms. It may leave a bad taste in your mouth. Spitting or gagging may also occur. Sometimes, it can take a while for the signs or symptoms to develop.    How is this diagnosed?  This condition is diagnosed based on your symptoms, if any, and your medical history. Your health care provider will ask about the substance that you ingested, including how much you swallowed and when you swallowed it. The health care provider will also do a physical exam to check for symptoms of poisoning.    How is this treated?  Treatment is not usually needed for this condition. Your health care provider may monitor you for a time to make sure symptoms do not develop.    Follow these instructions at home:  Eating and drinking    Three cups showing dark yellow, yellow, and pale yellow urine.   Follow instructions from your health care provider about eating or drinking restrictions.  If you have vomited after ingesting the substance, you may need to wait a few hours before eating. Start with small sips of clear liquids until your stomach settles.  Drink enough fluid to keep your urine pale yellow.  General instructions    Watch for any change in your condition or for new symptoms.  Take over-the-counter and prescription medicines only as told by your health care provider.  How is this prevented?  Make sure all pills and vitamins are clearly labeled.  Store products and medicines in their original containers.  Read directions for medicines carefully.  Store makeup and non-edible products away from food and medicines.  Read all directions for hair and makeup products carefully.  Use aerosol products in an area that has a lot of airflow.  Where to find more information  Poison Control: poison.org  Contact a health care provider if:  You have any new or worse symptoms.  You have a fever.  You vomit.  You cough.  You have any signs of dehydration. These may include:  Severe thirst.  Dry lips and mouth.  Dizziness or confusion.  Dark urine or no urge to urinate.  Fast breathing or fast pulse.  Get help right away if:  You have trouble walking, swallowing, or breathing.  You have chest pain, or fast or irregular heartbeats (palpitations).  You have severe tiredness (fatigue) or weakness.  You have a seizure.  You have pain in your abdomen, repeated vomiting, or severe diarrhea.  You have any bleeding from the mouth or rectum.  These symptoms may be an emergency. Get help right away. Call 911.  Do not wait to see if the symptoms will go away.  Do not drive yourself to the hospital.  Summary  Nontoxic ingestion occurs when you swallow a substance that is not food. It is not likely to cause serious medical problems.  Commonly ingested nontoxic items include makeup, ink, shaving cream, dog or cat food, and vitamins without iron.  Nontoxic ingestion usually does not need treatment, but your health care provider may monitor you for a time to make sure symptoms do not develop.  Call your local poison control center at 1-813.576.6772 if you are not sure whether the substance you swallowed is nontoxic or toxic.  This information is not intended to replace advice given to you by your health care provider. Make sure you discuss any questions you have with your health care provider.

## 2025-03-31 NOTE — ED PROVIDER NOTE - PROGRESS NOTE DETAILS
Patient stable.  Overall appears well.  No current pain or symptoms.  Ingestion very small, nontoxic.  Recommended to drink plenty of fluids

## 2025-04-15 ENCOUNTER — EMERGENCY (EMERGENCY)
Facility: HOSPITAL | Age: 20
LOS: 1 days | End: 2025-04-15
Attending: EMERGENCY MEDICINE
Payer: MEDICAID

## 2025-04-15 VITALS
WEIGHT: 139.99 LBS | DIASTOLIC BLOOD PRESSURE: 85 MMHG | RESPIRATION RATE: 19 BRPM | TEMPERATURE: 99 F | SYSTOLIC BLOOD PRESSURE: 125 MMHG | OXYGEN SATURATION: 100 % | HEART RATE: 88 BPM | HEIGHT: 63 IN

## 2025-04-15 VITALS
OXYGEN SATURATION: 99 % | RESPIRATION RATE: 17 BRPM | HEART RATE: 78 BPM | SYSTOLIC BLOOD PRESSURE: 118 MMHG | TEMPERATURE: 98 F | DIASTOLIC BLOOD PRESSURE: 80 MMHG

## 2025-04-15 DIAGNOSIS — Z78.9 OTHER SPECIFIED HEALTH STATUS: Chronic | ICD-10-CM

## 2025-04-15 PROCEDURE — 99282 EMERGENCY DEPT VISIT SF MDM: CPT

## 2025-04-15 PROCEDURE — 99283 EMERGENCY DEPT VISIT LOW MDM: CPT

## 2025-04-15 RX ORDER — ONDANSETRON HCL/PF 4 MG/2 ML
4 VIAL (ML) INJECTION ONCE
Refills: 0 | Status: ACTIVE | OUTPATIENT
Start: 2025-04-15 | End: 2025-04-15

## 2025-04-15 NOTE — ED PROVIDER NOTE - ENMT, MLM
Airway patent, Nasal mucosa clear. Mouth with normal mucosa. Throat has no vesicles, no oropharyngeal exudates and uvula is midline, +minimal tenderness of percussion of the tooth #32, no facial swelling, no gingival swelling

## 2025-04-15 NOTE — ED PROVIDER NOTE - CLINICAL SUMMARY MEDICAL DECISION MAKING FREE TEXT BOX
18 yo female presenting with dental pain x 2 weeks. pain has been persistent but improved with tylenol. denies facial swelling, denies fever/chills. patient states she has an appointment to see a dentist this thursday.  on exam, no evidence of infection.  patient is refusing pain medication but is requesting for zofran, although she's drinking water

## 2025-04-15 NOTE — ED PROVIDER NOTE - PATIENT PORTAL LINK FT
You can access the FollowMyHealth Patient Portal offered by [facility] by registering at the following website: [email or web address]. By joining [facility]’s FollowMyHealth portal, you will also be able to view your health information using other applications (apps) compatible with our system.

## 2025-04-15 NOTE — ED PROVIDER NOTE - NSFOLLOWUPINSTRUCTIONS_ED_ALL_ED_FT
Dental Cavities    AMBULATORY CARE:    Dental cavities, also called caries, are holes in teeth caused by bacteria. The bacteria mix with carbohydrates from foods and create acids. The acids break down areas of enamel, which covers the outside of a tooth.  Tooth Decay    Common signs and symptoms: You may not have any symptoms if cavities have just started to form. When cavities reach deeper parts of your tooth, you may start to feel pain. You may also have any of the following:    Pain when you chew or eat hot or cold foods    Chalky white, yellow, or brown tooth    Gum swelling  Seek care immediately if:    You have severe pain in your tooth or jaw.    You have swelling in your jaw or cheek.  Call your dentist if:    You have a fever.    Your tooth pain gets worse.    You have questions or concerns about your condition or care.  Treatment for dental cavities may include any of the following:    A fluoride treatment may be given during dental visits, or you may use products with fluoride at home. Your dentist will tell you what kind of fluoride you need and how to use it.    A filling may be placed in your tooth after the decayed portion is removed. The filling may help to protect your tooth from more decay.    A root canal may be needed if the tooth is infected or the decay is severe.  Prevent dental cavities:    Brush your teeth at least 2 times a day with fluoride toothpaste.    Use dental floss at least once a day to clean between your teeth.    See your dentist every 6 months for dental cleanings and oral exams.    Rinse your mouth with water or mouthwash after meals and snacks. Chew sugarless gum.    Eat a variety of healthy foods. Healthy foods include fruits, vegetables, whole-grain breads, low-fat dairy products, beans, lean meats, and fish. Avoid sugary and starchy food and drinks that can stick between your teeth.  Healthy Foods  Follow up with your dentist as directed: Write down your questions so you remember to ask them during your visits.    © [employer] 1973, 2025

## 2025-04-15 NOTE — ED ADULT NURSE NOTE - NS ED NURSE DC PT EDUCATION RESOURCES
OFFICE VISIT      Patient: Pam Barlow Date of Service: 5/10/2023   : 2004 MRN: [id number]     Yes

## 2025-04-15 NOTE — ED ADULT NURSE NOTE - OBJECTIVE STATEMENT
pt with multiple ed visits in the past comes to ed today c/o right side bottom molar pain. wants to make sure she doesn't have an infection. has taken Tylenol and ibuprofen at home without relief. also has hx tof TMJ. pt denies fevers, chills, nausea, vomiting, headache,  weakness or other complaints.

## 2025-04-15 NOTE — ED ADULT NURSE NOTE - NECK ASSESSMENT
normal Island Pedicle Flap-Requiring Vessel Identification Text: The defect edges were debeveled with a #15 scalpel blade.  Given the location of the defect, shape of the defect and the proximity to free margins an island pedicle advancement flap was deemed most appropriate.  Using a sterile surgical marker, an appropriate advancement flap was drawn, based on the axial vessel mentioned above, incorporating the defect, outlining the appropriate donor tissue and placing the expected incisions within the relaxed skin tension lines where possible.    The area thus outlined was incised deep to adipose tissue with a #15 scalpel blade.  The skin margins were undermined to an appropriate distance in all directions around the primary defect and laterally outward around the island pedicle utilizing iris scissors.  There was minimal undermining beneath the pedicle flap.

## 2025-04-15 NOTE — ED PROVIDER NOTE - OBJECTIVE STATEMENT
18 yo female presenting with dental pain x 2 weeks. pain has been persistent but improved with tylenol. denies facial swelling, denies fever/chills. patient states she has an appointment to see a dentist this thursday

## 2025-04-22 NOTE — ED ADULT NURSE NOTE - BEFAST SPEECH SLURRED
Andrei Dale, PAEsdrasC   No

## 2025-04-26 ENCOUNTER — NON-APPOINTMENT (OUTPATIENT)
Age: 20
End: 2025-04-26

## 2025-05-01 NOTE — ED PROVIDER NOTE - DISCHARGE DATE
"Chief Complaint   Patient presents with    Pharyngitis     X 3 days     Headache    Fatigue         Subjective:   HISTORY OF PRESENT ILLNESS: Bela Gonzalez is a 45 y.o. female who presents for sore throat, headache and fatigue for the last 3 days.  Concerned about strep again. This would be the 3rd time in the last 4-6 weeks.  She states she did throw away her toothbrush on both occasions.  No one else in the family is sick with symptoms    Patient denies fevers or inability to swallow, voice changes, neck pain or difficulty opening their mouth.      Medications, Allergies, current problem list, Social and Family history reviewed today in Epic.     Objective:     /60 (BP Location: Right arm, Patient Position: Sitting, BP Cuff Size: Large adult)   Pulse (!) 104   Temp 37.2 °C (98.9 °F) (Temporal)   Resp 16   Ht 1.6 m (5' 3\")   Wt 115 kg (253 lb)   SpO2 98%     Physical Exam  Vitals reviewed.   Constitutional:       Appearance: Normal appearance.   HENT:      Mouth/Throat:      Mouth: Mucous membranes are moist.      Pharynx: Uvula midline. Posterior oropharyngeal erythema present. No pharyngeal swelling, oropharyngeal exudate or uvula swelling.      Tonsils: No tonsillar exudate or tonsillar abscesses. 2+ on the right. 2+ on the left.   Cardiovascular:      Rate and Rhythm: Normal rate.   Pulmonary:      Effort: Pulmonary effort is normal.   Skin:     General: Skin is warm and dry.   Neurological:      Mental Status: She is alert and oriented to person, place, and time.   Psychiatric:         Mood and Affect: Mood normal.          Assessment/Plan:     Diagnosis and associated orders    I personally reviewed prior external notes and test results pertinent to today's visit.     1. Pharyngitis due to Streptococcus species  POCT CEPHEID GROUP A STREP - PCR    POCT Mononucleosis (mono)    penicillin v potassium (VEETID) 500 MG Tab            IMPRESSION: The patient is well appearing here with " reassuring exam and vitals signs. Pt once again tested positive for strep throat.  She has an ENT and I advised her to make a follow up appointment with him to be seen asap.  Pen VK sent to pharmacy, she will throw away her toothbrush.  Discussed that maybe her  or children are carriers, they could get tested by their PCP.  Discussed anticipated duration of illness, return to work/school, and indications to RTC or go to the Emergency department.    Patient states understanding of the plan of care and discharge instructions.  They are discharged in stable condition.         Please note that this dictation was created using voice recognition software. I have made a reasonable attempt to correct obvious errors, but I expect that there are errors of grammar and possibly content that I did not discover before finalizing the note.    This note was electronically signed by CLARITA Flores   15-Apr-2025

## 2025-05-12 ENCOUNTER — NON-APPOINTMENT (OUTPATIENT)
Age: 20
End: 2025-05-12

## 2025-05-15 ENCOUNTER — EMERGENCY (EMERGENCY)
Facility: HOSPITAL | Age: 20
LOS: 1 days | End: 2025-05-15
Payer: MEDICAID

## 2025-05-15 VITALS — HEIGHT: 63 IN

## 2025-05-15 DIAGNOSIS — Z78.9 OTHER SPECIFIED HEALTH STATUS: Chronic | ICD-10-CM

## 2025-05-15 PROCEDURE — L9991: CPT

## 2025-05-16 ENCOUNTER — EMERGENCY (EMERGENCY)
Facility: HOSPITAL | Age: 20
LOS: 1 days | End: 2025-05-16
Attending: STUDENT IN AN ORGANIZED HEALTH CARE EDUCATION/TRAINING PROGRAM | Admitting: STUDENT IN AN ORGANIZED HEALTH CARE EDUCATION/TRAINING PROGRAM
Payer: MEDICAID

## 2025-05-16 VITALS
HEIGHT: 63 IN | DIASTOLIC BLOOD PRESSURE: 80 MMHG | SYSTOLIC BLOOD PRESSURE: 117 MMHG | RESPIRATION RATE: 18 BRPM | WEIGHT: 136.69 LBS | TEMPERATURE: 99 F | OXYGEN SATURATION: 100 % | HEART RATE: 89 BPM

## 2025-05-16 DIAGNOSIS — Z78.9 OTHER SPECIFIED HEALTH STATUS: Chronic | ICD-10-CM

## 2025-05-16 PROCEDURE — 99283 EMERGENCY DEPT VISIT LOW MDM: CPT

## 2025-05-16 PROCEDURE — 99282 EMERGENCY DEPT VISIT SF MDM: CPT

## 2025-05-16 RX ORDER — ACETAMINOPHEN 500 MG/5ML
650 LIQUID (ML) ORAL ONCE
Refills: 0 | Status: COMPLETED | OUTPATIENT
Start: 2025-05-16 | End: 2025-05-16

## 2025-05-16 RX ADMIN — Medication 650 MILLIGRAM(S): at 14:34

## 2025-05-16 NOTE — ED ADULT NURSE NOTE - OBJECTIVE STATEMENT
Pt is alert and oriented. Pt states that she was mild headache and blurred vision for 3 days , history of bell palsy 3 weeks ago , moving all extremities no facial droop clear speech at this time. Pt denies sob, chest pain, nausea, vomiting, and dizziness. Pt resp are even and unlabored, skin color nika for race. Pt updated on plan of care.

## 2025-05-16 NOTE — ED PROVIDER NOTE - CLINICAL SUMMARY MEDICAL DECISION MAKING FREE TEXT BOX
Patient is a 19y old  Female who presents with a chief complaint of eye complaints. she reports that 3 weeks ago she was diagnosed with bells palsy and completed the medication course however she forgot to tape her right eye shut at times. she feels that if she looks quickly to the left and right she has some double vision but no symptoms without moving her eyes. denies new trauma or pain, numbness weakness.    PE: Patient is well appearing, no acute distress, no signs of head trauma, no conjunctival injection, normal EOM and no double vision on exam here, lungs clear bilaterally, abdomen is soft and nontender to palpation without guarding or rebound, normal pulses in all extremities, anxious appearing    I reassured the patient that forgetting to tape the eye before was only to prevent the eye from drying out with the facial drooping from bells palsy, her symptoms have mostly resolved and she is already using eye drops artifical tears frequently. there is no signs of double vision or other neurological symptoms at this time. she feels well, no indicaiton for further testing at this time. will give eye doctor outpatient follow up in the meantime, patient is in agreement with the plan

## 2025-05-16 NOTE — ED ADULT TRIAGE NOTE - BEFAST SPEECH SLURRED
Attempted to reach pt by phone; no answer; pt admitted for chest pain; upon cardiac evaluation; pt scheduled for cardiac stress today. Per cardio note, home if negative. Will follow for any needs. Colleen Lew. No

## 2025-05-16 NOTE — ED PROVIDER NOTE - CARE PROVIDER_API CALL
Samantha Welch  Neurology  [address], [address]  [place] [address]  Phone: [phone]  Fax: [phone]  Follow Up Time: 1-3 Days    Jermain Chou  Neurology  [address] [place] [id number]  Phone: [phone]  Fax: [phone]  Follow Up Time: 1-3 Days

## 2025-05-16 NOTE — ED PROVIDER NOTE - NEURO NEGATIVE STATEMENT, MLM
no loss of consciousness, no gait abnormality, no headache, no sensory deficits, and no weakness. intermittent headache, no dizziness, no one sided weakness

## 2025-05-16 NOTE — ED PROVIDER NOTE - NSFOLLOWUPCLINICS_GEN_ALL_ED_FT
[facility] - Ophthalmology  Ophthalmology  [address]  [place] [address]  Phone: [phone]  Fax:   Follow Up Time: 1-3 Days

## 2025-05-16 NOTE — ED PROVIDER NOTE - DIFFERENTIAL DIAGNOSIS
Differentials include but limited to dry eye, continued symptoms from Bell's Palsy. no evidence of infection Differential Diagnosis

## 2025-05-16 NOTE — ED ADULT TRIAGE NOTE - CHIEF COMPLAINT QUOTE
patient c/o of mild headache and blurred vision for 3 days , history of bell palsy 3 weeks ago , moving all extremities no facial droop clear speech at this time

## 2025-05-16 NOTE — ED ADULT NURSE NOTE - TEMPLATE
Arrival/HPI





- General


Chief Complaint: Dizziness/Lightheaded


Time Seen by Provider: 09/23/18 21:07


Historian: Patient





- History of Present Illness


Narrative History of Present Illness (Text): 





09/23/18 21:26


57 year old female, whose past medical history includes  Asthma, hypertension 

and hypercholestremia, presents to the emergency department complaining of 

episodes of dizziness and lightheadedness for couple occasions earlier today at 

home. Patient states she felt like she was going to pass out and had to sit 

down to rest on each occasions. She reports a slight headache earlier which had 

resolved. Patient denies any fever, chills, chest pain, shortness of breath, 

palpitation, nausea, vomiting, diarrhea, urinary symptoms, back pain, neck pain

, dizziness, or any other complaints.


Time/Duration: Other (earlier today)


Symptom Onset: Gradual


Symptom Course: Intermittent


Activities at Onset: Light


Context: Home





Past Medical History





- Provider Review


Nursing Documentation Reviewed: Yes





- Infectious Disease


Hx of Infectious Diseases: None





- Reproductive


Menopause: Yes





- Cardiac


Hx Hypertension: Yes





- Pulmonary


Hx Asthma: Yes





- Psychiatric


Hx Substance Use: No





- Anesthesia


Hx Anesthesia: No


Hx Anesthesia Reactions: No


Hx Malignant Hyperthermia: No





Family/Social History





- Physician Review


Nursing Documentation Reviewed: Yes


Family/Social History: No Known Family HX


Smoking Status: Never Smoked


Hx Alcohol Use: Yes


Hx Substance Use: No





Allergies/Home Meds


Allergies/Adverse Reactions: 


Allergies





peanuts Allergy (Uncoded 09/23/18 21:20)


 RASH








Home Medications: 


 Home Meds











 Medication  Instructions  Recorded  Confirmed


 


Lisinopril/Hydrochlorothiazide 1 tab PO DAILY 10/14/16 09/23/18





[Lisinopril-Hydrochlorothiazide 25   





mg-20 mg]   


 


Pravastatin Sodium [Pravachol] 40 mg PO DAILY 10/14/16 09/23/18














Review of Systems





- Physician Review


All systems were reviewed & negative as marked: Yes





- Review of Systems


Constitutional: absent: Fevers, Other (Chills)


Respiratory: absent: SOB


Cardiovascular: absent: Chest Pain, Palpitations


Gastrointestinal: absent: Diarrhea, Nausea, Vomiting


Genitourinary Female: absent: Dysuria, Frequency, Hematuria


Musculoskeletal: absent: Back Pain, Neck Pain


Neurological: Dizziness, Other (lightheadedness).  absent: Headache





Physical Exam


Vital Signs Reviewed: Yes


Vital Signs











  Temp Pulse Resp BP Pulse Ox


 


 09/23/18 21:20  98.9 F  76  18  138/85  100











Temperature: Afebrile


Blood Pressure: Normal


Pulse: Regular


Respiratory Rate: Normal


Appearance: Positive for: Well-Appearing, Non-Toxic, Comfortable


Pain Distress: None


Mental Status: Positive for: Alert and Oriented X 3





- Systems Exam


Head: Present: Atraumatic, Normocephalic


Pupils: Present: PERRL


Extroacular Muscles: Present: EOMI


Conjunctiva: Present: Normal


Ears: Present: NORMAL TM


Mouth: Present: Moist Mucous Membranes


Neck: Present: Normal Range of Motion


Respiratory/Chest: Present: Clear to Auscultation, Good Air Exchange.  No: 

Respiratory Distress, Accessory Muscle Use


Cardiovascular: Present: Regular Rate and Rhythm, Normal S1, S2.  No: Murmurs


Abdomen: No: Tenderness, Distention, Peritoneal Signs


Back: Present: Normal Inspection


Upper Extremity: Present: Normal Inspection.  No: Cyanosis, Edema


Lower Extremity: Present: Normal Inspection.  No: Edema


Neurological: Present: GCS=15, CN II-XII Intact, Speech Normal, Motor Func 

Grossly Intact, Normal Sensory Function, Normal Cerebellar Funct, Norm Deep 

Tendon Reflexes, Gait Normal, Memory Normal, Normal 2Pt Descrimination


Skin: Present: Warm, Dry, Normal Color.  No: Rashes


Psychiatric: Present: Alert, Oriented x 3, Normal Insight, Normal Concentration





Medical Decision Making


ED Course and Treatment: 





09/23/18 21:30


Impression:


57 year old female presents complaining of episodes of dizziness and 

lightheadedness couple of occasions earlier today at home. 





Plan:


-- CT Head w/o Contrast 


-- EKG 


-- Labs


-- Chest X-ray 


-- Reassess and disposition





Prior Visits:


Notes and results from previous visits were reviewed.





Progress Notes:





EKG shows NSR at 78 BPM with LAD, LBBB, non-specific ST/T wave changes. 

Interpreted by me. 





09/23/18 23:03


CXR Impression: As read by me, no acute process. 


 


EXAM:CT Head Without Intravenous Contrast


Dictated and Authenticated by: Anika Garcia MD


09/23/2018 11:13 PM


IMPRESSION:


No evidence of acute intracranial hemorrhage.





09/23/18 23:23


Case discussed with medical resident and Dr. Schneider who is aware and agrees 

with the plan. Accepts patient into hospitalist service.  





- Lab Interpretations


Lab Results: 








 09/23/18 22:43 





 09/23/18 22:43 





 Lab Results





09/23/18 22:43: WBC 6.7, RBC 4.21, Hgb 10.9 L, Hct 34.1 L, MCV 81.0, MCH 25.9, 

MCHC 32.0, RDW 15.2 H, Plt Count 247, MPV 9.8


09/23/18 22:43: Sodium 144, Potassium 3.0 L, Chloride 103, Carbon Dioxide 31, 

Anion Gap 13, BUN 19, Creatinine 1.0, Est GFR (African Amer) > 60, Est GFR (Non-

Af Amer) 57, Random Glucose 97, Calcium 9.6, Total Bilirubin 0.2, AST 25, ALT 22

, Alkaline Phosphatase 95, Lactate Dehydrogenase 406, Total Creatine Kinase 150

, Troponin I < 0.01, Total Protein 7.7, Albumin 4.1, Globulin 3.6, Albumin/

Globulin Ratio 1.2


09/23/18 22:43: PT 10.8, INR 0.95, APTT 25.7


09/23/18 21:55: POC Glucose (mg/dL) 101








I have reviewed the lab results: Yes





- RAD Interpretation


Radiology Orders: 








09/23/18 21:34


HEAD W/O CONTRAST [CT] Stat 





09/23/18 21:35


CHEST ONE VIEW [RAD] Stat 














- EKG Interpretation


Interpreted by ED Physician: Yes


Type: 12 lead EKG





- Medication Orders


Current Medication Orders: 











Discontinued Medications





Potassium Chloride (K-Dur 20 Meq Er Tab)  40 meq PO STAT STA


   Stop: 09/23/18 23:04











- Scribe Statement


The provider has reviewed the documentation as recorded by the Ousmane Mcneill





Provider Scribe Attestation:


All medical record entries made by the Jameibwendy were at my direction and 

personally dictated by me. I have reviewed the chart and agree that the record 

accurately reflects my personal performance of the history, physical exam, 

medical decision making, and the department course for this patient. I have 

also personally directed, reviewed, and agree with the discharge instructions 

and disposition.








Disposition/Present on Arrival





- Present on Arrival


Any Indicators Present on Arrival: No


History of DVT/PE: No


History of Uncontrolled Diabetes: No


Urinary Catheter: No


History of Decub. Ulcer: No


History Surgical Site Infection Following: None





- Disposition


Have Diagnosis and Disposition been Completed?: Yes


Diagnosis: 


 Near syncope, Hypokalemia





Disposition: HOSPITALIZED


Disposition Time: 23:22


Patient Problems: 


 Current Active Problems











Problem Status Onset


 


Hypokalemia Acute  


 


Near syncope Acute  











Condition: STABLE


Forms:  [employer] (English) General

## 2025-05-16 NOTE — ED ADULT NURSE NOTE - MODE OF DISCHARGE
[facility]    2024      Subjective:       Patient ID:  Lou is a 17 m.o. female .  has no past medical history on file.    History of Present Illness    CHIEF COMPLAINT:  Lou presents today for diaper rash.    DIAPER RASH:  She reports recurring diaper rash for about a month and a half. Nystatin cream was previously prescribed at an urgent care visit, providing some relief but not fully resolving the issue. She has been using Jose and Aquaphor for management of the rash.    RECENT EAR INFECTION:  She had a right ear infection diagnosed approximately one month ago, coinciding with a positive rhinovirus test. Antibiotics were prescribed, but there is concern about an incomplete course as she may not have received the full dosage.    URINARY SYMPTOMS:  She reports experiencing urinary symptoms that began approximately one month ago, describing a burning sensation during urination and increased frequency. She has been taking Azo for the past two days, which has alleviated her current symptoms.      ROS:  Constitutional: -chills, -fever  Respiratory: -cough, -shortness of breath  Cardiovascular: -chest pain  Gastrointestinal: -abdominal pain, -constipation, -diarrhea, -nausea, -vomiting  Neurological: -dizziness, -lightheadedness, -headaches  Skin: +rash  Genitourinary: +frequency           Diet:  B:  L:  D:  V:  D:  S:    Exercise:  {exercise level:42368}    Screenings:  Last pap smear- ***  Last mammogram- ***  Immunizations UTD***  Last colonoscopy- ***  DEXA Scan- ***female >64yo***    Social Hx:  Smoker- {YES/NO:}    Low dose CT scan age 50-80 and >20 year smoker   AAA men age 65-75 who ever smoked  EtOH use- {YES/NO:}  Drug use- {YES/NO:}  Sexual active- {YES/NO:}    Patient Active Problem List   Diagnosis    [place] 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   Current Medications    NYSTATIN (MYCOSTATIN) CREAM    Apply topically 2 (two) times daily.    NYSTATIN (MYCOSTATIN) OINTMENT    Apply topically 2 (two) times daily.               Objective:      Wt 10.8 kg (23 lb 13 oz)   Estimated body mass index is 16.8 kg/m² as calculated from the following:    Height as of 10/2/24: 2' 7.5" (0.8 m).    Weight as of 10/2/24: 10.7 kg (23 lb 11.2 oz).    Physical Exam        Assessment and Plan:     Lou was seen today for diaper rash.    Diagnoses and all orders for this visit:    Diaper dermatitis  -     Discontinue: clotrimazole (LOTRIMIN) 1 % cream; Apply to affected areas three times daily.  -     clotrimazole (LOTRIMIN) 1 % cream; Apply to affected areas three times daily.          Assessment & Plan    IMPRESSION:  - Assessed patient's recurring diaper rash of 1.5 months duration  - Evaluated previous treatment with nystatin cream, which provided partial relief  - Examined patient's ears, found both clear with no signs of infection  - Considered recent rhinovirus infection and antibiotic treatment from 1 month ago  - Evaluated patient's urinary symptoms, which have improved with Azo use    DIAPER DERMATITIS:  - Lou to apply Aquaphor or Vaseline to diaper area to absorb moisture and keep skin moisturized.  - Started new ointment for diaper rash.    URINARY TRACT INFECTION:  - Continued Azo for urinary symptoms.  - Urine sample ordered for lab analysis.         The patient was informed of the following statements     Emergency Care: Seek immediate medical attention " in the emergency room if you experience any new or worsening symptoms, or if your current symptoms significantly increase in severity.  Patient Acknowledgment: Patient verbalizes understanding of the plan and agrees to proceed with the recommended care.     Follow Up:       No follow-ups on file.    Other Orders Placed This Visit:  No orders of the defined types were placed in this encounter.        This note was generated with the assistance of ambient listening technology. Verbal consent was obtained by the patient and accompanying visitor(s) for the recording of patient appointment to facilitate this note. I attest to having reviewed and edited the generated note for accuracy, though some syntax or spelling errors may persist. Please contact the author of this note for any clarification.    Aileen Almeida PA-C       Ambulatory

## 2025-05-16 NOTE — ED ADULT NURSE NOTE - SUICIDE SCREENING QUESTION 3
[General Appearance - Alert] : alert [General Appearance - In No Acute Distress] : in no acute distress [General Appearance - Well Nourished] : well nourished [General Appearance - Well Developed] : well developed [Sclera] : the sclera and conjunctiva were normal [Outer Ear] : the ears and nose were normal in appearance [Neck Appearance] : the appearance of the neck was normal [Auscultation Breath Sounds / Voice Sounds] : lungs were clear to auscultation bilaterally [Heart Rate And Rhythm] : heart rate was normal and rhythm regular [Heart Sounds] : normal S1 and S2 [Heart Sounds Gallop] : no gallops [Murmurs] : no murmurs [Heart Sounds Pericardial Friction Rub] : no pericardial rub [No CVA Tenderness] : no ~M costovertebral angle tenderness [No Spinal Tenderness] : no spinal tenderness [Abnormal Walk] : normal gait [Nail Clubbing] : no clubbing  or cyanosis of the fingernails [Musculoskeletal - Swelling] : no joint swelling seen [Motor Tone] : muscle strength and tone were normal [] : no rash [Skin Lesions] : no skin lesions [No Focal Deficits] : no focal deficits [Oriented To Time, Place, And Person] : oriented to person, place, and time [FreeTextEntry1] : +++multiple tender points No

## 2025-05-16 NOTE — ED PROVIDER NOTE - PROGRESS NOTE DETAILS
Patient stable.  Visual acuity 20/20 bilaterally.  Recommend eyedrops over-the-counter and ophthalmology follow-up.  Also recommend neurology follow-up if headaches continue.  Referrals to neurology and opthalmology provided.  CT and CTA results reviewed from previous ED visit which was unremarkable

## 2025-05-16 NOTE — ED PROVIDER NOTE - NSFOLLOWUPINSTRUCTIONS_ED_ALL_ED_FT
Visual Disturbances  An eye with a detached retina.  A visual disturbance is any problem that interferes with your normal vision. This can affect one eye or both eyes. Some types of visual disturbances come and go without treatment and do not cause a permanent problem. Other visual disturbances may be a sign of an eye emergency or a medical emergency.    Visual disturbances include:  Blurred vision.  Being unable to see certain colors.  Being sensitive to light.  Double vision in one eye or both eyes.  Partial vision loss (visual field deficit).  Being unaware of objects on one side of the body (visual spatial inattention).  Rhythmic eye movements that you cannot control (nystagmus).  Short-term or long-term blindness.  Seeing:  Floating spots or lines (floaters).  Flashing or shimmering lights.  Zigzagging lines or stars.  The floor as tilted (visual midline shift).  Things that are not really there (hallucinations).  Causes of visual disturbances include:  Dry eyes.  Eye infection.  The thin membrane at the back of the eye  from the eyeball (retinal detachment).  High blood pressure.  Migraine.  Glaucoma.  Ischemic stroke.  Cerebral aneurysm.  Diabetes.  It is important to get your eyes checked by a health care provider or eye care specialist (ophthalmologist or optometrist) as soon as possible to determine the cause of your visual disturbance.    Follow these instructions at home:  Take over-the-counter and prescription medicines only as told by your health care provider.  Do not use any products that contain nicotine or tobacco. These products include cigarettes, chewing tobacco, and vaping devices, such as e-cigarettes. If you need help quitting, ask your health care provider.  To lower your risk of the problems that can lead to visual disturbances:  Eat a balanced diet that includes fruits and vegetables, whole grains, lean meat, and low-fat dairy.  Maintain a healthy weight. Work with your health care provider to lose weight if you need to.  Exercise regularly. Ask your health care provider what activities are safe for you.  Do not drive if you have trouble seeing. Ask your health care provider for guidance about when it is and is not safe for you to drive.  Keep all follow-up visits. This is important.  Contact a health care provider if:  Your visual disturbance changes or becomes worse.  Get help right away if:  A sign showing the "BE FAST" categories for the warning signs of a stroke: balance, eyes, face, arms, speech, and time.  You have new visual disturbances.  You suddenly see flashing lights or floaters.  You suddenly have a dark area in your field of vision, especially in the lower part. This can lead to a loss of central vision.  You suddenly lose vision in one or both eyes.  You have any symptoms of a stroke. "BE FAST" is an easy way to remember the main warning signs of a stroke:  B - Balance. Signs are dizziness, sudden trouble walking, or loss of balance.  E - Eyes. Signs are trouble seeing or a sudden change in vision.  F - Face. Signs are sudden weakness or numbness of the face, or the face or eyelid drooping on one side.  A - Arms. Signs are weakness or numbness in an arm. This happens suddenly and usually on one side of the body.  S - Speech. Signs are sudden trouble speaking, slurred speech, or trouble understanding what people say.  T - Time. Time to call emergency services. Write down what time symptoms started.  Have other signs of a stroke, such as:  A sudden, severe headache with no known cause.  Nausea or vomiting.  A seizure.  These symptoms may represent a serious problem that is an emergency. Do not wait to see if the symptoms will go away. Get medical help right away. Call your local emergency services (911 in the [place] Do not drive yourself to the hospital.    Summary  A visual disturbance is any problem that interferes with your normal vision.  It is important to get your eyes checked by a health care provider or eye care specialist to determine what kind of visual disturbance you have.  Some visual disturbances may be a sign of an eye emergency or medical emergency.  This information is not intended to replace advice given to you by your health care provider. Make sure you discuss any questions you have with your health care provider. Recommend artificial tears over-the-counter to right eye  Follow-up with ophthalmology clinic, referral provided if needed.  Call to make appointment  Follow-up with neurology.  Call to make appointment.  Referral provided if needed      Visual Disturbances  An eye with a detached retina.  A visual disturbance is any problem that interferes with your normal vision. This can affect one eye or both eyes. Some types of visual disturbances come and go without treatment and do not cause a permanent problem. Other visual disturbances may be a sign of an eye emergency or a medical emergency.    Visual disturbances include:  Blurred vision.  Being unable to see certain colors.  Being sensitive to light.  Double vision in one eye or both eyes.  Partial vision loss (visual field deficit).  Being unaware of objects on one side of the body (visual spatial inattention).  Rhythmic eye movements that you cannot control (nystagmus).  Short-term or long-term blindness.  Seeing:  Floating spots or lines (floaters).  Flashing or shimmering lights.  Zigzagging lines or stars.  The floor as tilted (visual midline shift).  Things that are not really there (hallucinations).  Causes of visual disturbances include:  Dry eyes.  Eye infection.  The thin membrane at the back of the eye  from the eyeball (retinal detachment).  High blood pressure.  Migraine.  Glaucoma.  Ischemic stroke.  Cerebral aneurysm.  Diabetes.  It is important to get your eyes checked by a health care provider or eye care specialist (ophthalmologist or optometrist) as soon as possible to determine the cause of your visual disturbance.    Follow these instructions at home:  Take over-the-counter and prescription medicines only as told by your health care provider.  Do not use any products that contain nicotine or tobacco. These products include cigarettes, chewing tobacco, and vaping devices, such as e-cigarettes. If you need help quitting, ask your health care provider.  To lower your risk of the problems that can lead to visual disturbances:  Eat a balanced diet that includes fruits and vegetables, whole grains, lean meat, and low-fat dairy.  Maintain a healthy weight. Work with your health care provider to lose weight if you need to.  Exercise regularly. Ask your health care provider what activities are safe for you.  Do not drive if you have trouble seeing. Ask your health care provider for guidance about when it is and is not safe for you to drive.  Keep all follow-up visits. This is important.  Contact a health care provider if:  Your visual disturbance changes or becomes worse.  Get help right away if:  A sign showing the "BE FAST" categories for the warning signs of a stroke: balance, eyes, face, arms, speech, and time.  You have new visual disturbances.  You suddenly see flashing lights or floaters.  You suddenly have a dark area in your field of vision, especially in the lower part. This can lead to a loss of central vision.  You suddenly lose vision in one or both eyes.  You have any symptoms of a stroke. "BE FAST" is an easy way to remember the main warning signs of a stroke:  B - Balance. Signs are dizziness, sudden trouble walking, or loss of balance.  E - Eyes. Signs are trouble seeing or a sudden change in vision.  F - Face. Signs are sudden weakness or numbness of the face, or the face or eyelid drooping on one side.  A - Arms. Signs are weakness or numbness in an arm. This happens suddenly and usually on one side of the body.  S - Speech. Signs are sudden trouble speaking, slurred speech, or trouble understanding what people say.  T - Time. Time to call emergency services. Write down what time symptoms started.  Have other signs of a stroke, such as:  A sudden, severe headache with no known cause.  Nausea or vomiting.  A seizure.  These symptoms may represent a serious problem that is an emergency. Do not wait to see if the symptoms will go away. Get medical help right away. Call your local emergency services (911 in the [place] Do not drive yourself to the hospital.    Summary  A visual disturbance is any problem that interferes with your normal vision.  It is important to get your eyes checked by a health care provider or eye care specialist to determine what kind of visual disturbance you have.  Some visual disturbances may be a sign of an eye emergency or medical emergency.  This information is not intended to replace advice given to you by your health care provider. Make sure you discuss any questions you have with your health care provider.

## 2025-05-16 NOTE — ED PROVIDER NOTE - PATIENT PORTAL LINK FT
You can access the FollowMyHealth Patient Portal offered by [facility] by registering at the following website: [email or web address]. By joining [employer]’s FollowMyHealth portal, you will also be able to view your health information using other applications (apps) compatible with our system.

## 2025-05-16 NOTE — ED ADULT TRIAGE NOTE - SOURCE OF INFORMATION
PROCEDURE NOTE  Date: 5/16/2025   Name: Evelio Marroquin  YOB: 1963    Procedures      Cardiac Catheterization Procedure Note   Patient: Evelio Marroquin  MRN: [id number]  SSN: [id number]   YOB: 1963 Age: 61 y.o.  Sex: female    Date of Procedure: 5/16/2025   Pre-procedure Diagnosis: TAA, AI  Post-procedure Diagnosis: TAA, AI  Procedure: Cors, moderate sedation, unable to cross AoV for [facility]  :  Dr. Kadeem Mo MD    Assistant(s):  None  Anesthesia: Moderate Sedation   Estimated Blood Loss: Less than 10 mL   Specimens Removed: None  Findings: No significant atherosclerotic disease.  Complications: None   Implants:  None  Signed by:  Kadeem Mo MD  5/16/2025  2:51 PM
Patient

## 2025-05-16 NOTE — ED ADULT NURSE NOTE - NSFALLUNIVINTERV_ED_ALL_ED
Bed/Stretcher in lowest position, wheels locked, appropriate side rails in place/Call bell, personal items and telephone in reach/Instruct patient to call for assistance before getting out of bed/chair/stretcher/Non-slip footwear applied when patient is off stretcher/[place] to call system/Physically safe environment - no spills, clutter or unnecessary equipment/Purposeful proactive rounding/Room/bathroom lighting operational, light cord in reach

## 2025-05-16 NOTE — ED PROVIDER NOTE - OBJECTIVE STATEMENT
19-year-old female with history of vertigo, migraines, ADHD, autism, and anxiety presents to emergency room with intermittent blurred vision for the past few weeks after being diagnosed with Bell's palsy.  Patient states she was diagnosed with Bell's palsy and given antivirals and steroids.  No longer taking the antivirals and steroids.  Patient states she did not consistently tape her eye shut like she was told she was supposed to do.  States occasionally when she looks a certain way she will have brief double vision and then go away.  Denies any blurred vision at this time.  Also reports will have intermittent headaches.  Denies any current headache.  History of headaches in the past.  Patient had CT and CTA at this emergency room 3 weeks ago when she was diagnosed with Bell's palsy which was unremarkable.  Has not followed up with neurology or ophthalmology.  Denies any eye pain or drainage.  Denies any eye redness  LUIS EDUARDO Art

## 2025-05-18 ENCOUNTER — NON-APPOINTMENT (OUTPATIENT)
Age: 20
End: 2025-05-18

## 2025-05-29 ENCOUNTER — EMERGENCY (EMERGENCY)
Facility: HOSPITAL | Age: 20
LOS: 1 days | End: 2025-05-29
Attending: EMERGENCY MEDICINE | Admitting: STUDENT IN AN ORGANIZED HEALTH CARE EDUCATION/TRAINING PROGRAM
Payer: MEDICAID

## 2025-05-29 ENCOUNTER — NON-APPOINTMENT (OUTPATIENT)
Age: 20
End: 2025-05-29

## 2025-05-29 VITALS
RESPIRATION RATE: 14 BRPM | SYSTOLIC BLOOD PRESSURE: 119 MMHG | DIASTOLIC BLOOD PRESSURE: 85 MMHG | HEART RATE: 85 BPM | TEMPERATURE: 99 F | WEIGHT: 119.93 LBS | OXYGEN SATURATION: 98 % | HEIGHT: 63 IN

## 2025-05-29 DIAGNOSIS — Z78.9 OTHER SPECIFIED HEALTH STATUS: Chronic | ICD-10-CM

## 2025-05-29 PROCEDURE — L9991: CPT

## 2025-05-29 NOTE — ED ADULT NURSE NOTE - OBJECTIVE STATEMENT
pt is A&Ox3, ambulatory, able to make needs known and presents S/P mechanical fall in which pt struck her forehead on a dresser. No obvious signs of injury noted. PT well appearing and offer no other complaints.

## 2025-06-02 NOTE — ED ADULT NURSE NOTE - ED COMFORT CARE
Patient identification verified with 2 identifiers.    Location: [facility] - [address] [address] [facility]. [place], [place] [address] [phone]     Referring Physician: Dr. Melvin Bahena  Enrollment/ Re-enrollment date:  26  INR Goal: 2.0-3.0  INR monitoring is per [facility] protocol.  Anticoagulation Medication: warfarin  Indication: Atrial Fibrillation/Atrial Flutter    Subjective   Bleeding signs/symptoms: No    Bruising: No   Major bleeding event: No  Thrombosis signs/symptoms: No  Thromboembolic event: No  Missed doses: No  Extra doses: No  Medication changes: Yes  completed Cipro on .  Dietary changes: Yes  Pt is taking ensure dietary supplements  Change in health: No  Change in activity: No  Alcohol: No  Other concerns: No    Upcoming Procedures:  Does the Patient Have any upcoming procedures that require interruption in anticoagulation therapy? no  Does the patient require bridging? no      Anticoagulation Summary  As of 2025      INR goal:  2.0-3.0   TTR:  71.6% (1.5 y)   INR used for dosin.90 (2025)   Weekly warfarin total:  40.5 mg               Assessment/Plan   Subtherapeutic     1. New dose: TWD increased approximately 5% per protocol    2. Next INR: 1 week      Education provided to patient during the visit:  Patient instructed to call in interim with questions, concerns and changes.   Patient educated on interactions between medications and warfarin.   Patient educated on dietary consistency in vitamin k consumption.   Patient educated on signs of bleeding/clotting.   Patient educated on compliance with dosing, follow up appointments, and prescribed plan of care.        
Patient informed
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
## 2025-06-13 ENCOUNTER — EMERGENCY (EMERGENCY)
Facility: HOSPITAL | Age: 20
LOS: 1 days | End: 2025-06-13
Attending: STUDENT IN AN ORGANIZED HEALTH CARE EDUCATION/TRAINING PROGRAM | Admitting: STUDENT IN AN ORGANIZED HEALTH CARE EDUCATION/TRAINING PROGRAM
Payer: MEDICAID

## 2025-06-13 VITALS
RESPIRATION RATE: 20 BRPM | SYSTOLIC BLOOD PRESSURE: 115 MMHG | WEIGHT: 130.07 LBS | HEIGHT: 63 IN | OXYGEN SATURATION: 100 % | HEART RATE: 91 BPM | DIASTOLIC BLOOD PRESSURE: 73 MMHG | TEMPERATURE: 99 F

## 2025-06-13 DIAGNOSIS — Z78.9 OTHER SPECIFIED HEALTH STATUS: Chronic | ICD-10-CM

## 2025-06-13 PROCEDURE — 99283 EMERGENCY DEPT VISIT LOW MDM: CPT

## 2025-06-13 PROCEDURE — 99282 EMERGENCY DEPT VISIT SF MDM: CPT

## 2025-06-13 NOTE — ED PROVIDER NOTE - CLINICAL SUMMARY MEDICAL DECISION MAKING FREE TEXT BOX
Here with concern for exposure to rubbing alcohol.  Patient reports she was cleaning a wound and some splashed into her mouth.  She then rinse her mouth out.  Patient had some lightheadedness but states this may be related to anxiety.  Minimal if any exposure to likely isopropyl alcohol.  Vital stable exam normal.  Will discharge with reassurance.

## 2025-06-13 NOTE — ED ADULT NURSE NOTE - IN ACCORDANCE WITH [place] STATE LAW, WE OFFER EVERY PATIENT A HEPATITIS C TEST. WOULD YOU LIKE TO BE TESTED TODAY?
"ED Call Back Questions    1. How are you doing since leaving the Emergency Department?    Much better.  Meds helped.  Good ER visit, no issues or concerns.  2. Do you have any questions about your discharge instructions? No     3. Have you filled your new prescriptions yet? Yes   a. Do you have any questions about those medications? No     4. Were you able to make a follow-up appointment with the physician? No     5. Do you have a primary care physician? Yes   a. If No, would you like for me to set you up with one? N/A  i. If Yes, “I will have our ED  give you a call right back at this number to work with you on the best time for an appointment.”    6. We are always looking to get better at what we do. Do you have any suggestions for what we can do to be even better? No   a. If Yes, \"Thank you for sharing your concerns. I apologize. I will follow up with our manager and patient . Would you like someone to call you back?\" N/A    7. Is there anything else I can do for you? No     "
Opt out
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
## 2025-06-13 NOTE — ED ADULT NURSE NOTE - CAS ELECT INFOMATION PROVIDED
Pt left w/out d/c papers. Dr. Kurtz she does not want to wait for her papers. He explained he is printing them out for her, but pt declined and left./DC instructions Pt left w/out d/c papers. Told Dr. Kurtz she does not want to wait for her papers. He explained he is printing them out for her, but pt declined and left./DC instructions

## 2025-06-13 NOTE — ED PROVIDER NOTE - OBJECTIVE STATEMENT
19-year-old female with history of vertigo, migraines, ADHD, autism, and anxiety presents to emergency room with c/o exposure to rubbing alcohol. Pt states that she was cleaning a wound a few drops of rubbing alcohol splashed into her mouth today. States that she then rinsed her mouth out. States that she had some lightheadedness earlier but thinks it may be related to her anxiety and feels better now. Denies any symptoms at this time.

## 2025-06-13 NOTE — ED PROVIDER NOTE - NSFOLLOWUPINSTRUCTIONS_ED_ALL_ED_FT
Please follow up with your Primary Care Physician and any specialists as discussed.  If you ever have concern for toxic ingestion please call poison control at: [phone].  If your symptoms persist or worsen, please seek care. Either return to the Emergency Department, go to urgent care or see your primary care doctor.

## 2025-06-13 NOTE — ED PROVIDER NOTE - PATIENT PORTAL LINK FT
You can access the FollowMyHealth Patient Portal offered by [facility] by registering at the following website: [email or web address]. By joining [facility]’s FollowMyHealth portal, you will also be able to view your health information using other applications (apps) compatible with our system.

## 2025-07-07 NOTE — ED ADULT NURSE NOTE - PAIN RATING/NUMBER SCALE (0-10): ACTIVITY
You can access the FollowMyHealth Patient Portal offered by [facility] by registering at the following website: [email or web address]. By joining [facility]’s FollowMyHealth portal, you will also be able to view your health information using other applications (apps) compatible with our system. 7 (severe pain)

## 2025-08-16 ENCOUNTER — NON-APPOINTMENT (OUTPATIENT)
Age: 20
End: 2025-08-16

## 2025-08-17 ENCOUNTER — EMERGENCY (EMERGENCY)
Facility: HOSPITAL | Age: 20
LOS: 1 days | End: 2025-08-17
Attending: EMERGENCY MEDICINE | Admitting: EMERGENCY MEDICINE
Payer: MEDICAID

## 2025-08-17 VITALS
SYSTOLIC BLOOD PRESSURE: 100 MMHG | OXYGEN SATURATION: 99 % | RESPIRATION RATE: 15 BRPM | WEIGHT: 130.07 LBS | DIASTOLIC BLOOD PRESSURE: 64 MMHG | HEART RATE: 76 BPM | HEIGHT: 63 IN | TEMPERATURE: 98 F

## 2025-08-17 VITALS
SYSTOLIC BLOOD PRESSURE: 102 MMHG | TEMPERATURE: 99 F | HEART RATE: 79 BPM | DIASTOLIC BLOOD PRESSURE: 65 MMHG | OXYGEN SATURATION: 99 % | RESPIRATION RATE: 15 BRPM

## 2025-08-17 DIAGNOSIS — Z78.9 OTHER SPECIFIED HEALTH STATUS: Chronic | ICD-10-CM

## 2025-08-17 LAB — HCG SERPL-ACNC: <1 MIU/ML — SIGNIFICANT CHANGE UP

## 2025-08-17 PROCEDURE — 36415 COLL VENOUS BLD VENIPUNCTURE: CPT

## 2025-08-17 PROCEDURE — 99284 EMERGENCY DEPT VISIT MOD MDM: CPT

## 2025-08-17 PROCEDURE — 86618 LYME DISEASE ANTIBODY: CPT

## 2025-08-17 PROCEDURE — 84702 CHORIONIC GONADOTROPIN TEST: CPT

## 2025-08-17 RX ORDER — PREDNISONE 20 MG/1
3 TABLET ORAL
Qty: 15 | Refills: 0
Start: 2025-08-17 | End: 2025-08-21

## 2025-08-17 RX ORDER — PREDNISONE 20 MG/1
60 TABLET ORAL ONCE
Refills: 0 | Status: COMPLETED | OUTPATIENT
Start: 2025-08-17 | End: 2025-08-17

## 2025-08-17 RX ADMIN — Medication 1000 MILLIGRAM(S): at 16:33

## 2025-08-18 LAB
B BURGDOR C6 AB SER-ACNC: NEGATIVE — SIGNIFICANT CHANGE UP
B BURGDOR IGG+IGM SER QL IB: SIGNIFICANT CHANGE UP
B BURGDOR IGG+IGM SER-ACNC: 0.05 INDEX — SIGNIFICANT CHANGE UP (ref 0.01–0.9)

## 2025-08-20 RX ORDER — PREDNISONE 20 MG/1
3 TABLET ORAL
Qty: 15 | Refills: 0
Start: 2025-08-20 | End: 2025-08-24

## 2025-08-23 ENCOUNTER — NON-APPOINTMENT (OUTPATIENT)
Age: 20
End: 2025-08-23

## 2025-08-24 ENCOUNTER — NON-APPOINTMENT (OUTPATIENT)
Age: 20
End: 2025-08-24

## 2025-09-09 ENCOUNTER — NON-APPOINTMENT (OUTPATIENT)
Age: 20
End: 2025-09-09

## 2025-09-11 ENCOUNTER — EMERGENCY (EMERGENCY)
Facility: HOSPITAL | Age: 20
LOS: 1 days | End: 2025-09-11
Attending: EMERGENCY MEDICINE | Admitting: EMERGENCY MEDICINE
Payer: MEDICAID

## 2025-09-11 ENCOUNTER — NON-APPOINTMENT (OUTPATIENT)
Age: 20
End: 2025-09-11

## 2025-09-11 VITALS
DIASTOLIC BLOOD PRESSURE: 73 MMHG | OXYGEN SATURATION: 100 % | TEMPERATURE: 98 F | RESPIRATION RATE: 16 BRPM | HEART RATE: 65 BPM | SYSTOLIC BLOOD PRESSURE: 115 MMHG

## 2025-09-11 VITALS
TEMPERATURE: 98 F | RESPIRATION RATE: 16 BRPM | DIASTOLIC BLOOD PRESSURE: 71 MMHG | HEART RATE: 84 BPM | HEIGHT: 63 IN | OXYGEN SATURATION: 100 % | WEIGHT: 134.92 LBS | SYSTOLIC BLOOD PRESSURE: 108 MMHG

## 2025-09-11 DIAGNOSIS — Z78.9 OTHER SPECIFIED HEALTH STATUS: Chronic | ICD-10-CM

## 2025-09-11 LAB — HCG UR QL: NEGATIVE — SIGNIFICANT CHANGE UP

## 2025-09-11 PROCEDURE — 71046 X-RAY EXAM CHEST 2 VIEWS: CPT

## 2025-09-11 PROCEDURE — 93005 ELECTROCARDIOGRAM TRACING: CPT

## 2025-09-11 PROCEDURE — 72040 X-RAY EXAM NECK SPINE 2-3 VW: CPT

## 2025-09-11 PROCEDURE — 99284 EMERGENCY DEPT VISIT MOD MDM: CPT

## 2025-09-11 PROCEDURE — 81025 URINE PREGNANCY TEST: CPT

## 2025-09-11 PROCEDURE — 72040 X-RAY EXAM NECK SPINE 2-3 VW: CPT | Mod: 26

## 2025-09-11 PROCEDURE — 71046 X-RAY EXAM CHEST 2 VIEWS: CPT | Mod: 26

## 2025-09-11 RX ORDER — IBUPROFEN 200 MG
600 TABLET ORAL ONCE
Refills: 0 | Status: COMPLETED | OUTPATIENT
Start: 2025-09-11 | End: 2025-09-11

## 2025-09-11 RX ADMIN — Medication 600 MILLIGRAM(S): at 19:44

## 2025-09-11 RX ADMIN — Medication 600 MILLIGRAM(S): at 18:54

## 2025-09-13 ENCOUNTER — EMERGENCY (EMERGENCY)
Facility: HOSPITAL | Age: 20
LOS: 1 days | End: 2025-09-13
Attending: STUDENT IN AN ORGANIZED HEALTH CARE EDUCATION/TRAINING PROGRAM | Admitting: STUDENT IN AN ORGANIZED HEALTH CARE EDUCATION/TRAINING PROGRAM
Payer: MEDICAID

## 2025-09-13 VITALS
DIASTOLIC BLOOD PRESSURE: 74 MMHG | HEART RATE: 88 BPM | TEMPERATURE: 98 F | HEIGHT: 63 IN | SYSTOLIC BLOOD PRESSURE: 111 MMHG | OXYGEN SATURATION: 100 % | WEIGHT: 130.07 LBS | RESPIRATION RATE: 19 BRPM

## 2025-09-13 VITALS
RESPIRATION RATE: 14 BRPM | SYSTOLIC BLOOD PRESSURE: 112 MMHG | DIASTOLIC BLOOD PRESSURE: 71 MMHG | TEMPERATURE: 98 F | OXYGEN SATURATION: 100 % | HEART RATE: 85 BPM

## 2025-09-13 DIAGNOSIS — Z78.9 OTHER SPECIFIED HEALTH STATUS: Chronic | ICD-10-CM

## 2025-09-13 PROCEDURE — 99284 EMERGENCY DEPT VISIT MOD MDM: CPT

## 2025-09-13 PROCEDURE — 99283 EMERGENCY DEPT VISIT LOW MDM: CPT

## 2025-09-16 ENCOUNTER — NON-APPOINTMENT (OUTPATIENT)
Age: 20
End: 2025-09-16